# Patient Record
Sex: MALE | NOT HISPANIC OR LATINO
[De-identification: names, ages, dates, MRNs, and addresses within clinical notes are randomized per-mention and may not be internally consistent; named-entity substitution may affect disease eponyms.]

---

## 2017-08-01 PROBLEM — Z00.00 ENCOUNTER FOR PREVENTIVE HEALTH EXAMINATION: Status: ACTIVE | Noted: 2017-08-01

## 2017-08-10 ENCOUNTER — APPOINTMENT (OUTPATIENT)
Dept: GASTROENTEROLOGY | Facility: CLINIC | Age: 57
End: 2017-08-10

## 2017-08-10 ENCOUNTER — APPOINTMENT (OUTPATIENT)
Dept: THORACIC SURGERY | Facility: CLINIC | Age: 57
End: 2017-08-10
Payer: MEDICAID

## 2017-08-10 ENCOUNTER — APPOINTMENT (OUTPATIENT)
Dept: GASTROENTEROLOGY | Facility: CLINIC | Age: 57
End: 2017-08-10
Payer: MEDICAID

## 2017-08-10 VITALS
HEART RATE: 75 BPM | WEIGHT: 131 LBS | BODY MASS INDEX: 18.34 KG/M2 | SYSTOLIC BLOOD PRESSURE: 135 MMHG | DIASTOLIC BLOOD PRESSURE: 78 MMHG | OXYGEN SATURATION: 99 % | RESPIRATION RATE: 16 BRPM | HEIGHT: 71 IN

## 2017-08-10 VITALS
WEIGHT: 131 LBS | RESPIRATION RATE: 15 BRPM | OXYGEN SATURATION: 98 % | SYSTOLIC BLOOD PRESSURE: 122 MMHG | BODY MASS INDEX: 18.34 KG/M2 | HEART RATE: 79 BPM | DIASTOLIC BLOOD PRESSURE: 78 MMHG | HEIGHT: 71 IN | TEMPERATURE: 98.4 F

## 2017-08-10 DIAGNOSIS — Z85.72 PERSONAL HISTORY OF NON-HODGKIN LYMPHOMAS: ICD-10-CM

## 2017-08-10 DIAGNOSIS — R13.14 DYSPHAGIA, PHARYNGOESOPHAGEAL PHASE: ICD-10-CM

## 2017-08-10 DIAGNOSIS — Z80.42 FAMILY HISTORY OF MALIGNANT NEOPLASM OF PROSTATE: ICD-10-CM

## 2017-08-10 DIAGNOSIS — Z78.9 OTHER SPECIFIED HEALTH STATUS: ICD-10-CM

## 2017-08-10 DIAGNOSIS — F17.200 NICOTINE DEPENDENCE, UNSPECIFIED, UNCOMPLICATED: ICD-10-CM

## 2017-08-10 DIAGNOSIS — F17.210 NICOTINE DEPENDENCE, CIGARETTES, UNCOMPLICATED: ICD-10-CM

## 2017-08-10 PROCEDURE — 99203 OFFICE O/P NEW LOW 30 MIN: CPT

## 2017-08-10 PROCEDURE — 99204 OFFICE O/P NEW MOD 45 MIN: CPT

## 2017-08-13 PROBLEM — R13.14 ESOPHAGEAL DYSPHAGIA: Status: ACTIVE | Noted: 2017-08-13

## 2017-08-13 PROBLEM — Z85.72 HISTORY OF NON-HODGKIN'S LYMPHOMA: Status: RESOLVED | Noted: 2017-08-13 | Resolved: 2017-08-13

## 2017-08-13 PROBLEM — F17.210 SMOKES CIGARETTES: Status: ACTIVE | Noted: 2017-08-10

## 2017-08-24 ENCOUNTER — APPOINTMENT (OUTPATIENT)
Dept: THORACIC SURGERY | Facility: CLINIC | Age: 57
End: 2017-08-24
Payer: MEDICAID

## 2017-08-24 VITALS
SYSTOLIC BLOOD PRESSURE: 138 MMHG | OXYGEN SATURATION: 98 % | HEIGHT: 71 IN | RESPIRATION RATE: 16 BRPM | WEIGHT: 131 LBS | BODY MASS INDEX: 18.34 KG/M2 | HEART RATE: 75 BPM | DIASTOLIC BLOOD PRESSURE: 78 MMHG

## 2017-08-24 PROCEDURE — 99213 OFFICE O/P EST LOW 20 MIN: CPT

## 2017-09-25 ENCOUNTER — APPOINTMENT (OUTPATIENT)
Dept: THORACIC SURGERY | Facility: CLINIC | Age: 57
End: 2017-09-25

## 2017-11-13 ENCOUNTER — APPOINTMENT (OUTPATIENT)
Dept: THORACIC SURGERY | Facility: CLINIC | Age: 57
End: 2017-11-13
Payer: MEDICAID

## 2017-11-13 VITALS
DIASTOLIC BLOOD PRESSURE: 60 MMHG | BODY MASS INDEX: 17.64 KG/M2 | SYSTOLIC BLOOD PRESSURE: 92 MMHG | HEIGHT: 71 IN | HEART RATE: 95 BPM | WEIGHT: 126 LBS

## 2017-11-13 PROCEDURE — 99213 OFFICE O/P EST LOW 20 MIN: CPT

## 2017-12-06 ENCOUNTER — APPOINTMENT (OUTPATIENT)
Dept: SURGICAL ONCOLOGY | Facility: CLINIC | Age: 57
End: 2017-12-06

## 2017-12-20 ENCOUNTER — APPOINTMENT (OUTPATIENT)
Dept: SURGICAL ONCOLOGY | Facility: CLINIC | Age: 57
End: 2017-12-20
Payer: MEDICAID

## 2018-01-03 ENCOUNTER — APPOINTMENT (OUTPATIENT)
Dept: SURGICAL ONCOLOGY | Facility: CLINIC | Age: 58
End: 2018-01-03
Payer: MEDICAID

## 2018-01-03 VITALS
RESPIRATION RATE: 12 BRPM | OXYGEN SATURATION: 98 % | DIASTOLIC BLOOD PRESSURE: 73 MMHG | HEART RATE: 95 BPM | TEMPERATURE: 98.6 F | SYSTOLIC BLOOD PRESSURE: 122 MMHG

## 2018-01-03 VITALS — HEIGHT: 71 IN | WEIGHT: 123.57 LBS | BODY MASS INDEX: 17.3 KG/M2

## 2018-01-03 DIAGNOSIS — C15.9 MALIGNANT NEOPLASM OF ESOPHAGUS, UNSPECIFIED: ICD-10-CM

## 2018-01-03 PROCEDURE — 99245 OFF/OP CONSLTJ NEW/EST HI 55: CPT

## 2018-01-03 RX ORDER — ONDANSETRON 4 MG/1
4 TABLET, ORALLY DISINTEGRATING ORAL EVERY 8 HOURS
Qty: 30 | Refills: 1 | Status: ACTIVE | COMMUNITY
Start: 2018-01-03 | End: 1900-01-01

## 2018-01-03 RX ORDER — LIDOCAINE HYDROCHLORIDE 20 MG/ML
2 SOLUTION OROPHARYNGEAL
Qty: 1 | Refills: 3 | Status: ACTIVE | COMMUNITY
Start: 2018-01-03 | End: 1900-01-01

## 2018-01-03 RX ORDER — OXYCODONE HYDROCHLORIDE 15 MG/1
15 TABLET ORAL
Qty: 40 | Refills: 0 | Status: ACTIVE | COMMUNITY
Start: 2018-01-03 | End: 1900-01-01

## 2018-01-16 ENCOUNTER — RESULT REVIEW (OUTPATIENT)
Age: 58
End: 2018-01-16

## 2018-01-16 ENCOUNTER — OUTPATIENT (OUTPATIENT)
Dept: OUTPATIENT SERVICES | Facility: HOSPITAL | Age: 58
LOS: 1 days | End: 2018-01-16

## 2018-01-16 DIAGNOSIS — C15.9 MALIGNANT NEOPLASM OF ESOPHAGUS, UNSPECIFIED: ICD-10-CM

## 2018-02-11 ENCOUNTER — FORM ENCOUNTER (OUTPATIENT)
Age: 58
End: 2018-02-11

## 2018-02-12 ENCOUNTER — NON-APPOINTMENT (OUTPATIENT)
Age: 58
End: 2018-02-12

## 2018-02-12 ENCOUNTER — APPOINTMENT (OUTPATIENT)
Dept: CARDIOLOGY | Facility: CLINIC | Age: 58
End: 2018-02-12
Payer: MEDICAID

## 2018-02-12 ENCOUNTER — OUTPATIENT (OUTPATIENT)
Dept: OUTPATIENT SERVICES | Facility: HOSPITAL | Age: 58
LOS: 1 days | End: 2018-02-12
Payer: MEDICAID

## 2018-02-12 VITALS
BODY MASS INDEX: 17.5 KG/M2 | SYSTOLIC BLOOD PRESSURE: 94 MMHG | HEIGHT: 71 IN | DIASTOLIC BLOOD PRESSURE: 60 MMHG | HEART RATE: 84 BPM | WEIGHT: 125 LBS | OXYGEN SATURATION: 99 %

## 2018-02-12 VITALS
SYSTOLIC BLOOD PRESSURE: 108 MMHG | WEIGHT: 119.05 LBS | TEMPERATURE: 98 F | HEIGHT: 71 IN | DIASTOLIC BLOOD PRESSURE: 72 MMHG | HEART RATE: 93 BPM | RESPIRATION RATE: 16 BRPM

## 2018-02-12 VITALS — SYSTOLIC BLOOD PRESSURE: 99 MMHG | DIASTOLIC BLOOD PRESSURE: 60 MMHG

## 2018-02-12 DIAGNOSIS — Z01.818 ENCOUNTER FOR OTHER PREPROCEDURAL EXAMINATION: ICD-10-CM

## 2018-02-12 DIAGNOSIS — C15.9 MALIGNANT NEOPLASM OF ESOPHAGUS, UNSPECIFIED: ICD-10-CM

## 2018-02-12 DIAGNOSIS — K21.9 GASTRO-ESOPHAGEAL REFLUX DISEASE WITHOUT ESOPHAGITIS: ICD-10-CM

## 2018-02-12 DIAGNOSIS — R06.09 OTHER FORMS OF DYSPNEA: ICD-10-CM

## 2018-02-12 DIAGNOSIS — Z01.810 ENCOUNTER FOR PREPROCEDURAL CARDIOVASCULAR EXAMINATION: ICD-10-CM

## 2018-02-12 LAB
ANION GAP SERPL CALC-SCNC: 17 MMOL/L — SIGNIFICANT CHANGE UP (ref 5–17)
APTT BLD: 31 SEC — SIGNIFICANT CHANGE UP (ref 27.5–37.4)
BASOPHILS # BLD AUTO: 0 K/UL — SIGNIFICANT CHANGE UP (ref 0–0.2)
BASOPHILS NFR BLD AUTO: 0.7 % — SIGNIFICANT CHANGE UP (ref 0–2)
BLD GP AB SCN SERPL QL: SIGNIFICANT CHANGE UP
BUN SERPL-MCNC: 15 MG/DL — SIGNIFICANT CHANGE UP (ref 8–20)
CALCIUM SERPL-MCNC: 9 MG/DL — SIGNIFICANT CHANGE UP (ref 8.6–10.2)
CHLORIDE SERPL-SCNC: 100 MMOL/L — SIGNIFICANT CHANGE UP (ref 98–107)
CO2 SERPL-SCNC: 25 MMOL/L — SIGNIFICANT CHANGE UP (ref 22–29)
CREAT SERPL-MCNC: 0.78 MG/DL — SIGNIFICANT CHANGE UP (ref 0.5–1.3)
EOSINOPHIL # BLD AUTO: 0.2 K/UL — SIGNIFICANT CHANGE UP (ref 0–0.5)
EOSINOPHIL NFR BLD AUTO: 3.7 % — SIGNIFICANT CHANGE UP (ref 0–5)
GLUCOSE SERPL-MCNC: 110 MG/DL — SIGNIFICANT CHANGE UP (ref 70–115)
HBA1C BLD-MCNC: 5.5 % — SIGNIFICANT CHANGE UP (ref 4–5.6)
HCT VFR BLD CALC: 32.6 % — LOW (ref 42–52)
HGB BLD-MCNC: 10.4 G/DL — LOW (ref 14–18)
INR BLD: 1.06 RATIO — SIGNIFICANT CHANGE UP (ref 0.88–1.16)
LYMPHOCYTES # BLD AUTO: 0.5 K/UL — LOW (ref 1–4.8)
LYMPHOCYTES # BLD AUTO: 10.9 % — LOW (ref 20–55)
MCHC RBC-ENTMCNC: 29.6 PG — SIGNIFICANT CHANGE UP (ref 27–31)
MCHC RBC-ENTMCNC: 31.9 G/DL — LOW (ref 32–36)
MCV RBC AUTO: 92.9 FL — SIGNIFICANT CHANGE UP (ref 80–94)
MONOCYTES # BLD AUTO: 0.5 K/UL — SIGNIFICANT CHANGE UP (ref 0–0.8)
MONOCYTES NFR BLD AUTO: 10.1 % — HIGH (ref 3–10)
NEUTROPHILS # BLD AUTO: 3.4 K/UL — SIGNIFICANT CHANGE UP (ref 1.8–8)
NEUTROPHILS NFR BLD AUTO: 74.4 % — HIGH (ref 37–73)
PLATELET # BLD AUTO: 464 K/UL — HIGH (ref 150–400)
POTASSIUM SERPL-MCNC: 4.3 MMOL/L — SIGNIFICANT CHANGE UP (ref 3.5–5.3)
POTASSIUM SERPL-SCNC: 4.3 MMOL/L — SIGNIFICANT CHANGE UP (ref 3.5–5.3)
PROTHROM AB SERPL-ACNC: 11.7 SEC — SIGNIFICANT CHANGE UP (ref 9.8–12.7)
RBC # BLD: 3.51 M/UL — LOW (ref 4.6–6.2)
RBC # FLD: 14.2 % — SIGNIFICANT CHANGE UP (ref 11–15.6)
SODIUM SERPL-SCNC: 142 MMOL/L — SIGNIFICANT CHANGE UP (ref 135–145)
TYPE + AB SCN PNL BLD: SIGNIFICANT CHANGE UP
WBC # BLD: 4.6 K/UL — LOW (ref 4.8–10.8)
WBC # FLD AUTO: 4.6 K/UL — LOW (ref 4.8–10.8)

## 2018-02-12 PROCEDURE — 93000 ELECTROCARDIOGRAM COMPLETE: CPT

## 2018-02-12 PROCEDURE — 86850 RBC ANTIBODY SCREEN: CPT

## 2018-02-12 PROCEDURE — 93010 ELECTROCARDIOGRAM REPORT: CPT

## 2018-02-12 PROCEDURE — 71046 X-RAY EXAM CHEST 2 VIEWS: CPT

## 2018-02-12 PROCEDURE — 71046 X-RAY EXAM CHEST 2 VIEWS: CPT | Mod: 26

## 2018-02-12 PROCEDURE — 36415 COLL VENOUS BLD VENIPUNCTURE: CPT

## 2018-02-12 PROCEDURE — 83036 HEMOGLOBIN GLYCOSYLATED A1C: CPT

## 2018-02-12 PROCEDURE — 99204 OFFICE O/P NEW MOD 45 MIN: CPT

## 2018-02-12 PROCEDURE — 80048 BASIC METABOLIC PNL TOTAL CA: CPT

## 2018-02-12 PROCEDURE — 86901 BLOOD TYPING SEROLOGIC RH(D): CPT

## 2018-02-12 PROCEDURE — 85027 COMPLETE CBC AUTOMATED: CPT

## 2018-02-12 PROCEDURE — 85730 THROMBOPLASTIN TIME PARTIAL: CPT

## 2018-02-12 PROCEDURE — 93005 ELECTROCARDIOGRAM TRACING: CPT

## 2018-02-12 PROCEDURE — 86900 BLOOD TYPING SEROLOGIC ABO: CPT

## 2018-02-12 PROCEDURE — G0463: CPT

## 2018-02-12 PROCEDURE — 85610 PROTHROMBIN TIME: CPT

## 2018-02-12 RX ORDER — CEFAZOLIN SODIUM 1 G
2000 VIAL (EA) INJECTION ONCE
Qty: 0 | Refills: 0 | Status: DISCONTINUED | OUTPATIENT
Start: 2018-02-26 | End: 2018-02-27

## 2018-02-12 RX ORDER — INFLUENZA VIRUS VACCINE 15; 15; 15; 15 UG/.5ML; UG/.5ML; UG/.5ML; UG/.5ML
0.5 SUSPENSION INTRAMUSCULAR ONCE
Qty: 0 | Refills: 0 | Status: COMPLETED | OUTPATIENT
Start: 2018-02-12 | End: 2018-02-12

## 2018-02-12 NOTE — H&P PST ADULT - HISTORY OF PRESENT ILLNESS
Pt is a 57 y.o male with esophageal cancer diagnosed 5 months ago with recent unintentional weight loss of about 40 lbs now scheduled for esophagogastroduodenoscopy (EGD) Robotic [place] Mehran esophagogastrectomy.

## 2018-02-12 NOTE — H&P PST ADULT - ASSESSMENT
Pt is a 57 y.o male with PMH of Hodgkin's lymphoma and esophageal cancer undergoing esophagogastroduodenoscopy (EGD) Robotic Yfn Mehran esophagogastrectomy. Instructed to hold any medications containing ibuprofen and aspirin 1 week prior to surgery.

## 2018-02-12 NOTE — H&P PST ADULT - NSANTHOSAYNRD_GEN_A_CORE
No. KILO screening performed.  STOP BANG Legend: 0-2 = LOW Risk; 3-4 = INTERMEDIATE Risk; 5-8 = HIGH Risk

## 2018-02-12 NOTE — H&P PST ADULT - PMH
Acid reflux disease    Hodgkins lymphoma  25 years ago  Weight loss, unintentional Acid reflux disease    Esophageal cancer    Hodgkins lymphoma  25 years ago  Weight loss, unintentional

## 2018-02-19 ENCOUNTER — FORM ENCOUNTER (OUTPATIENT)
Age: 58
End: 2018-02-19

## 2018-02-20 ENCOUNTER — OUTPATIENT (OUTPATIENT)
Dept: OUTPATIENT SERVICES | Facility: HOSPITAL | Age: 58
LOS: 1 days | End: 2018-02-20
Payer: MEDICAID

## 2018-02-20 DIAGNOSIS — R06.09 OTHER FORMS OF DYSPNEA: ICD-10-CM

## 2018-02-20 PROCEDURE — A9500: CPT

## 2018-02-20 PROCEDURE — 93306 TTE W/DOPPLER COMPLETE: CPT

## 2018-02-20 PROCEDURE — 93018 CV STRESS TEST I&R ONLY: CPT

## 2018-02-20 PROCEDURE — 78452 HT MUSCLE IMAGE SPECT MULT: CPT | Mod: 26

## 2018-02-20 PROCEDURE — 93016 CV STRESS TEST SUPVJ ONLY: CPT

## 2018-02-20 PROCEDURE — 93017 CV STRESS TEST TRACING ONLY: CPT

## 2018-02-20 PROCEDURE — 78452 HT MUSCLE IMAGE SPECT MULT: CPT

## 2018-02-20 PROCEDURE — 93306 TTE W/DOPPLER COMPLETE: CPT | Mod: 26

## 2018-02-26 ENCOUNTER — RESULT REVIEW (OUTPATIENT)
Age: 58
End: 2018-02-26

## 2018-02-26 ENCOUNTER — INPATIENT (INPATIENT)
Facility: HOSPITAL | Age: 58
LOS: 15 days | DRG: 326 | End: 2018-03-14
Attending: THORACIC SURGERY (CARDIOTHORACIC VASCULAR SURGERY) | Admitting: SPECIALIST
Payer: MEDICAID

## 2018-02-26 ENCOUNTER — APPOINTMENT (OUTPATIENT)
Dept: THORACIC SURGERY | Facility: HOSPITAL | Age: 58
End: 2018-02-26
Payer: MEDICAID

## 2018-02-26 ENCOUNTER — APPOINTMENT (OUTPATIENT)
Dept: SURGICAL ONCOLOGY | Facility: HOSPITAL | Age: 58
End: 2018-02-26

## 2018-02-26 VITALS
HEIGHT: 71 IN | WEIGHT: 119.05 LBS | RESPIRATION RATE: 16 BRPM | HEART RATE: 93 BPM | DIASTOLIC BLOOD PRESSURE: 75 MMHG | SYSTOLIC BLOOD PRESSURE: 110 MMHG | TEMPERATURE: 99 F | OXYGEN SATURATION: 100 %

## 2018-02-26 DIAGNOSIS — C15.9 MALIGNANT NEOPLASM OF ESOPHAGUS, UNSPECIFIED: ICD-10-CM

## 2018-02-26 LAB
ALBUMIN SERPL ELPH-MCNC: 3.6 G/DL — SIGNIFICANT CHANGE UP (ref 3.3–5.2)
ALP SERPL-CCNC: 65 U/L — SIGNIFICANT CHANGE UP (ref 40–120)
ALT FLD-CCNC: 31 U/L — SIGNIFICANT CHANGE UP
APTT BLD: 27.2 SEC — LOW (ref 27.5–37.4)
AST SERPL-CCNC: 65 U/L — HIGH
BILIRUB DIRECT SERPL-MCNC: 0.2 MG/DL — SIGNIFICANT CHANGE UP (ref 0–0.3)
BILIRUB INDIRECT FLD-MCNC: 0.4 MG/DL — SIGNIFICANT CHANGE UP (ref 0.2–1)
BILIRUB SERPL-MCNC: 0.6 MG/DL — SIGNIFICANT CHANGE UP (ref 0.4–2)
BLD GP AB SCN SERPL QL: SIGNIFICANT CHANGE UP
HCT VFR BLD CALC: 33.3 % — LOW (ref 42–52)
HGB BLD-MCNC: 10.8 G/DL — LOW (ref 14–18)
INR BLD: 1.06 RATIO — SIGNIFICANT CHANGE UP (ref 0.88–1.16)
MAGNESIUM SERPL-MCNC: 1.7 MG/DL — SIGNIFICANT CHANGE UP (ref 1.6–2.6)
MCHC RBC-ENTMCNC: 29.3 PG — SIGNIFICANT CHANGE UP (ref 27–31)
MCHC RBC-ENTMCNC: 32.4 G/DL — SIGNIFICANT CHANGE UP (ref 32–36)
MCV RBC AUTO: 90.2 FL — SIGNIFICANT CHANGE UP (ref 80–94)
PLATELET # BLD AUTO: 226 K/UL — SIGNIFICANT CHANGE UP (ref 150–400)
PROT SERPL-MCNC: 6.8 G/DL — SIGNIFICANT CHANGE UP (ref 6.6–8.7)
PROTHROM AB SERPL-ACNC: 11.7 SEC — SIGNIFICANT CHANGE UP (ref 9.8–12.7)
RBC # BLD: 3.69 M/UL — LOW (ref 4.6–6.2)
RBC # FLD: 14.6 % — SIGNIFICANT CHANGE UP (ref 11–15.6)
WBC # BLD: 10.2 K/UL — SIGNIFICANT CHANGE UP (ref 4.8–10.8)
WBC # FLD AUTO: 10.2 K/UL — SIGNIFICANT CHANGE UP (ref 4.8–10.8)

## 2018-02-26 PROCEDURE — 43236 UPPR GI SCOPE W/SUBMUC INJ: CPT

## 2018-02-26 PROCEDURE — 43117 PARTIAL REMOVAL OF ESOPHAGUS: CPT

## 2018-02-26 PROCEDURE — 88309 TISSUE EXAM BY PATHOLOGIST: CPT | Mod: 26

## 2018-02-26 PROCEDURE — 31622 DX BRONCHOSCOPE/WASH: CPT

## 2018-02-26 PROCEDURE — 43112 ESPHG TOT W/THRCM: CPT

## 2018-02-26 PROCEDURE — 44015 INSERT NEEDLE CATH BOWEL: CPT

## 2018-02-26 PROCEDURE — 38747 REMOVE ABDOMINAL LYMPH NODES: CPT | Mod: 59

## 2018-02-26 PROCEDURE — 71045 X-RAY EXAM CHEST 1 VIEW: CPT | Mod: 26

## 2018-02-26 PROCEDURE — ZZZZZ: CPT

## 2018-02-26 PROCEDURE — 88305 TISSUE EXAM BY PATHOLOGIST: CPT | Mod: 26

## 2018-02-26 RX ORDER — PANTOPRAZOLE SODIUM 20 MG/1
1 TABLET, DELAYED RELEASE ORAL
Qty: 0 | Refills: 0 | COMMUNITY

## 2018-02-26 RX ORDER — SODIUM CHLORIDE 9 MG/ML
3 INJECTION INTRAMUSCULAR; INTRAVENOUS; SUBCUTANEOUS EVERY 8 HOURS
Qty: 0 | Refills: 0 | Status: DISCONTINUED | OUTPATIENT
Start: 2018-02-26 | End: 2018-02-26

## 2018-02-26 RX ORDER — SUCRALFATE 1 G
1 TABLET ORAL
Qty: 0 | Refills: 0 | COMMUNITY

## 2018-02-26 RX ORDER — ACETAMINOPHEN 500 MG
1000 TABLET ORAL ONCE
Qty: 0 | Refills: 0 | Status: COMPLETED | OUTPATIENT
Start: 2018-02-26 | End: 2018-02-26

## 2018-02-26 RX ORDER — FENTANYL CITRATE 50 UG/ML
25 INJECTION INTRAVENOUS ONCE
Qty: 0 | Refills: 0 | Status: DISCONTINUED | OUTPATIENT
Start: 2018-02-26 | End: 2018-02-26

## 2018-02-26 RX ORDER — SODIUM CHLORIDE 9 MG/ML
1000 INJECTION, SOLUTION INTRAVENOUS
Qty: 0 | Refills: 0 | Status: DISCONTINUED | OUTPATIENT
Start: 2018-02-26 | End: 2018-03-06

## 2018-02-26 RX ORDER — CEFAZOLIN SODIUM 1 G
1000 VIAL (EA) INJECTION EVERY 8 HOURS
Qty: 0 | Refills: 0 | Status: COMPLETED | OUTPATIENT
Start: 2018-02-26 | End: 2018-02-27

## 2018-02-26 RX ADMIN — FENTANYL CITRATE 25 MICROGRAM(S): 50 INJECTION INTRAVENOUS at 22:34

## 2018-02-26 RX ADMIN — Medication 1000 MILLIGRAM(S): at 23:14

## 2018-02-26 RX ADMIN — FENTANYL CITRATE 25 MICROGRAM(S): 50 INJECTION INTRAVENOUS at 23:14

## 2018-02-26 RX ADMIN — Medication 400 MILLIGRAM(S): at 23:00

## 2018-02-26 NOTE — BRIEF OPERATIVE NOTE - PROCEDURE
<<-----Click on this checkbox to enter Procedure Jejunostomy feeding tube placement  02/26/2018  robotic assisted  Active  [facility]  EGD  02/26/2018    Active  [facility]  Robot-assisted esophagectomy using da Rohit Si system  02/26/2018    [facility]  02/26/2018  one frozen section bx. and 4 permanent biopsies  Active  [facility]IG  EGD  02/26/2018    Active  [facility]  Robot-assisted esophagectomy using da Rohit Si system  02/26/2018    Active  [facility]  Jejunostomy feeding tube placement  02/26/2018  robotic assisted  Active  Rickey Vera

## 2018-02-26 NOTE — BRIEF OPERATIVE NOTE - PROCEDURE
<<-----Click on this checkbox to enter Procedure Robot-assisted esophagectomy using da Rohit Si system  02/26/2018    Active  [facility]  Jejunostomy feeding tube placement  02/26/2018  robotic assisted  Active  Rickey Vera

## 2018-02-26 NOTE — BRIEF OPERATIVE NOTE - SPECIMENS
1 esophagus ( portion of ) and stomach ( portion of ) 1. frozen section, 2. 4 additional biopsies 3. esophagus ( portion of ) and stomach ( portion of )

## 2018-02-27 ENCOUNTER — TRANSCRIPTION ENCOUNTER (OUTPATIENT)
Age: 58
End: 2018-02-27

## 2018-02-27 DIAGNOSIS — C15.9 MALIGNANT NEOPLASM OF ESOPHAGUS, UNSPECIFIED: ICD-10-CM

## 2018-02-27 DIAGNOSIS — G89.18 OTHER ACUTE POSTPROCEDURAL PAIN: ICD-10-CM

## 2018-02-27 DIAGNOSIS — Z93.4 OTHER ARTIFICIAL OPENINGS OF GASTROINTESTINAL TRACT STATUS: ICD-10-CM

## 2018-02-27 DIAGNOSIS — Z29.9 ENCOUNTER FOR PROPHYLACTIC MEASURES, UNSPECIFIED: ICD-10-CM

## 2018-02-27 LAB
ANION GAP SERPL CALC-SCNC: 12 MMOL/L — SIGNIFICANT CHANGE UP (ref 5–17)
BUN SERPL-MCNC: 19 MG/DL — SIGNIFICANT CHANGE UP (ref 8–20)
CALCIUM SERPL-MCNC: 8.5 MG/DL — LOW (ref 8.6–10.2)
CHLORIDE SERPL-SCNC: 100 MMOL/L — SIGNIFICANT CHANGE UP (ref 98–107)
CO2 SERPL-SCNC: 24 MMOL/L — SIGNIFICANT CHANGE UP (ref 22–29)
CREAT SERPL-MCNC: 0.94 MG/DL — SIGNIFICANT CHANGE UP (ref 0.5–1.3)
GLUCOSE SERPL-MCNC: 107 MG/DL — SIGNIFICANT CHANGE UP (ref 70–115)
HCT VFR BLD CALC: 32.9 % — LOW (ref 42–52)
HGB BLD-MCNC: 10.8 G/DL — LOW (ref 14–18)
MAGNESIUM SERPL-MCNC: 1.7 MG/DL — SIGNIFICANT CHANGE UP (ref 1.6–2.6)
MCHC RBC-ENTMCNC: 29.4 PG — SIGNIFICANT CHANGE UP (ref 27–31)
MCHC RBC-ENTMCNC: 32.8 G/DL — SIGNIFICANT CHANGE UP (ref 32–36)
MCV RBC AUTO: 89.6 FL — SIGNIFICANT CHANGE UP (ref 80–94)
PHOSPHATE SERPL-MCNC: 4.2 MG/DL — SIGNIFICANT CHANGE UP (ref 2.4–4.7)
PLATELET # BLD AUTO: 234 K/UL — SIGNIFICANT CHANGE UP (ref 150–400)
POTASSIUM SERPL-MCNC: 4.5 MMOL/L — SIGNIFICANT CHANGE UP (ref 3.5–5.3)
POTASSIUM SERPL-SCNC: 4.5 MMOL/L — SIGNIFICANT CHANGE UP (ref 3.5–5.3)
RBC # BLD: 3.67 M/UL — LOW (ref 4.6–6.2)
RBC # FLD: 14.5 % — SIGNIFICANT CHANGE UP (ref 11–15.6)
SODIUM SERPL-SCNC: 136 MMOL/L — SIGNIFICANT CHANGE UP (ref 135–145)
WBC # BLD: 8.4 K/UL — SIGNIFICANT CHANGE UP (ref 4.8–10.8)
WBC # FLD AUTO: 8.4 K/UL — SIGNIFICANT CHANGE UP (ref 4.8–10.8)

## 2018-02-27 PROCEDURE — 74018 RADEX ABDOMEN 1 VIEW: CPT | Mod: 26,59

## 2018-02-27 PROCEDURE — 71045 X-RAY EXAM CHEST 1 VIEW: CPT | Mod: 26

## 2018-02-27 PROCEDURE — 99233 SBSQ HOSP IP/OBS HIGH 50: CPT

## 2018-02-27 PROCEDURE — 74018 RADEX ABDOMEN 1 VIEW: CPT | Mod: 26,77,59

## 2018-02-27 RX ORDER — KETOROLAC TROMETHAMINE 30 MG/ML
15 SYRINGE (ML) INJECTION EVERY 6 HOURS
Qty: 0 | Refills: 0 | Status: DISCONTINUED | OUTPATIENT
Start: 2018-02-27 | End: 2018-03-01

## 2018-02-27 RX ORDER — ENOXAPARIN SODIUM 100 MG/ML
40 INJECTION SUBCUTANEOUS DAILY
Qty: 0 | Refills: 0 | Status: DISCONTINUED | OUTPATIENT
Start: 2018-02-27 | End: 2018-03-02

## 2018-02-27 RX ORDER — MAGNESIUM SULFATE 500 MG/ML
2 VIAL (ML) INJECTION ONCE
Qty: 0 | Refills: 0 | Status: COMPLETED | OUTPATIENT
Start: 2018-02-27 | End: 2018-02-27

## 2018-02-27 RX ORDER — KETOROLAC TROMETHAMINE 30 MG/ML
30 SYRINGE (ML) INJECTION ONCE
Qty: 0 | Refills: 0 | Status: DISCONTINUED | OUTPATIENT
Start: 2018-02-27 | End: 2018-02-27

## 2018-02-27 RX ADMIN — SODIUM CHLORIDE 125 MILLILITER(S): 9 INJECTION, SOLUTION INTRAVENOUS at 11:33

## 2018-02-27 RX ADMIN — Medication 50 GRAM(S): at 08:21

## 2018-02-27 RX ADMIN — ENOXAPARIN SODIUM 40 MILLIGRAM(S): 100 INJECTION SUBCUTANEOUS at 13:01

## 2018-02-27 RX ADMIN — Medication 30 MILLIGRAM(S): at 13:01

## 2018-02-27 RX ADMIN — Medication 15 MILLIGRAM(S): at 19:45

## 2018-02-27 RX ADMIN — Medication 100 MILLIGRAM(S): at 00:18

## 2018-02-27 RX ADMIN — Medication 30 MILLIGRAM(S): at 13:16

## 2018-02-27 RX ADMIN — Medication 100 MILLIGRAM(S): at 06:12

## 2018-02-27 RX ADMIN — Medication 15 MILLIGRAM(S): at 19:23

## 2018-02-27 NOTE — PROGRESS NOTE ADULT - PROBLEM SELECTOR PLAN 1
admitted to CTICU 2/26  NGT to LIWS  Do not manipulate, d/c or use NGT for feeds/meds  strict NPO (feeds per j-tube as below)  continue right CTs to suction as ordered  continue PCEA per pain mgmt svc and toradol PRN  above including j-tube feeds as d/w Dr Blanco

## 2018-02-27 NOTE — CONSULT NOTE ADULT - SUBJECTIVE AND OBJECTIVE BOX
Chief Complaint: post operative pain    HPI: 58 yo male with Pt is a 57 y.o male PMH significant for esophageal cancer diagnosed 5 months ago with recent unintentional weight loss of about 40 lbs. He is now POD #1 s/p Robot-assisted esophagectomy using da Rohit Si system  02/26/2018  and Jejunostomy feeding tube placement  02/26/2018  robotic assisted. He is currently using epidural cath, continuous rate. He is reporting pain is suboptimally controlled with regimen. Pain is currently 8/10 with medications increasing to 10/10 at times. He denies side effects from the medications. Currently NPO at this time with chest tube placed. He is reporting pain is mostly located across the abdomen, diffuse, where the drain is. Pain is cramping, shooting, stabbing. Denies numbness or tingling into lower extremities or upper extremities. Denies SOB. He is able to move his extremities without difficulty. Castro catheter and NGT in place.           PAST MEDICAL & SURGICAL HISTORY:  Esophageal cancer  Weight loss, unintentional  Hodgkins lymphoma: 25 years ago  Acid reflux disease  No significant past surgical history      FAMILY HISTORY:  Family history of prostate cancer in father (Father)      SOCIAL HISTORY:  [ ] Denies Smoking, Alcohol, or Drug Use    Allergies    No Known Allergies    Intolerances        PAIN MEDICATIONS:  fentaNYL (2 MICROgram(s)/mL) + BUpivacaine 0.0625%  in 0.9% Sodium Chloride PCEA 250 milliLiter(s) Epidural PCA Continuous    Heme:    Antibiotics:    Cardiovascular:    GI:    Endocrine:    All Other Medications:  influenza   Vaccine 0.5 milliLiter(s) IntraMuscular once  lactated ringers. 1000 milliLiter(s) IV Continuous <Continuous>      REVIEW OF SYSTEMS:    CONSTITUTIONAL: No fever, weight loss, or fatigue  EYES: No eye pain, visual disturbances, or discharge  ENMT:  No difficulty hearing, tinnitus, vertigo; No sinus or throat pain  NECK: No pain or stiffness  RESPIRATORY: No cough, wheezing, chills or hemoptysis; No shortness of breath  CARDIOVASCULAR: No chest pain, palpitations, dizziness, or leg swelling  GASTROINTESTINAL: + post surgical abdominal or epigastric pain. No nausea, vomiting, or hematemesis; No diarrhea or constipation. No melena or hematochezia.  GENITOURINARY: No dysuria, frequency, hematuria, or incontinence  NEUROLOGICAL: No headaches, memory loss, loss of strength, numbness, or tremors  SKIN: No itching, burning, rashes, or lesions   LYMPH NODES: No enlarged glands  ENDOCRINE: No heat or cold intolerance; No hair loss  MUSCULOSKELETAL: No joint pain or swelling; No muscle, back, or extremity pain  PSYCHIATRIC: No depression, anxiety, mood swings, or difficulty sleeping  HEME/LYMPH: No easy bruising, or bleeding gums  ALLERY AND IMMUNOLOGIC: No hives or eczema      Vital Signs Last 24 Hrs  T(C): 36.3 (26 Feb 2018 21:00), Max: 36.3 (26 Feb 2018 21:00)  T(F): 97.3 (26 Feb 2018 21:00), Max: 97.3 (26 Feb 2018 21:00)  HR: 87 (27 Feb 2018 09:00) (83 - 103)  BP: 141/82 (27 Feb 2018 09:00) (95/56 - 159/78)  BP(mean): 106 (27 Feb 2018 09:00) (70 - 116)  RR: 26 (27 Feb 2018 09:00) (14 - 46)  SpO2: 96% (27 Feb 2018 09:00) (75% - 100%)    PAIN SCORE:    8     SCALE USED: (1-10 VNRS)             PHYSICAL EXAM:    GENERAL: NAD, well-groomed, well-developed, resting in bed,   HEAD:  Atraumatic, Normocephalic  EYES: EOMI, PERRLA, conjunctiva and sclera clear  ENMT: No tonsillar erythema, exudates, or enlargement; Moist mucous membranes, Good dentition, No lesions, NGT in place  NECK: Supple, No JVD, Normal thyroid  NERVOUS SYSTEM:  Alert & Oriented X3, Good concentration; Motor Strength 5/5 B/L upper and lower extremities; DTRs 2+ intact and symmetric  CHEST/LUNG: Clear to percussion bilaterally; No rales, rhonchi, wheezing, or rubs, chest tube in place  HEART: Regular rate and rhythm; No murmurs, rubs, or gallops  ABDOMEN: Soft, Nontender, Nondistended; Bowel sounds present, drain in place, castro catheter in place  EXTREMITIES:  2+ Peripheral Pulses, No clubbing, cyanosis, or edema  LYMPH: No lymphadenopathy noted  SKIN: No rashes or lesions        LABS:                          10.8   8.4   )-----------( 234      ( 27 Feb 2018 03:58 )             32.9     02-27    136  |  100  |  19.0  ----------------------------<  107  4.5   |  24.0  |  0.94    Ca    8.5<L>      27 Feb 2018 03:58  Phos  4.2     02-27  Mg     1.7     02-27    TPro  6.8  /  Alb  3.6  /  TBili  0.6  /  DBili  0.2  /  AST  65<H>  /  ALT  31  /  AlkPhos  65  02-26    PT/INR - ( 26 Feb 2018 23:01 )   PT: 11.7 sec;   INR: 1.06 ratio         PTT - ( 26 Feb 2018 23:01 )  PTT:27.2 sec      RADIOLOGY:    Drug Screen:            [X ]  [place]  Reviewed and Copied to Chart        Others' Prescriptions  Patient Name:	Prakash Dixon	YOB: 1960	  Address:	[address] [place] [address] [place] [address]	Sex:	Male	    Rx Written	Rx Dispensed	Drug	Quantity	Days Supply	Prescriber Name			  02/21/2018	02/21/2018	oxycodone hcl 15 mg tablet 	60	30	Jan Vinson (MD)			  01/30/2018	01/30/2018	oxycodone hcl 15 mg tablet 	40	10	Deniz Diamond			  01/03/2018	01/05/2018	oxycodone hcl 15 mg tablet 	40	10	Kely Griffin M (Eloise)			  11/13/2017	11/13/2017	oxycodone hcl 15 mg tablet 	120	30	Deniz Diamond			  10/12/2017	10/13/2017	oxycodone hcl 15 mg tablet 	120	30	Deniz Diamond			    Patient Name:	Prakash Dixon	YOB: 1960	  Address:	[address] [place] [address] [place] [address]	Sex:	Male	    Rx Written	Rx Dispensed	Drug	Quantity	Days Supply	Prescriber Name			  09/25/2017	09/25/2017	oxycodone hcl 5 mg tablet 	60	15	Felipe Hung Chief Complaint: post operative pain    HPI: 56 yo male with PMH significant for esophageal cancer diagnosed 5 months ago with recent unintentional weight loss of about 40 lbs. He is now POD #1 s/p Robot-assisted esophagectomy using da Rohit Si system  02/26/2018  and Jejunostomy feeding tube placement  02/26/2018  robotic assisted. He is currently using epidural cath, continuous rate. He is reporting pain is suboptimally controlled with regimen. Pain is currently 8/10 with medications increasing to 10/10 at times. He denies side effects from the medications. Currently NPO at this time with chest tube placed. He is reporting pain is mostly located across the abdomen, diffuse, where the drain is. Pain is cramping, shooting, stabbing. Denies numbness or tingling into lower extremities or upper extremities. Denies SOB. He is able to move his extremities without difficulty. Castro catheter and NGT in place.           PAST MEDICAL & SURGICAL HISTORY:  Esophageal cancer  Weight loss, unintentional  Hodgkins lymphoma: 25 years ago  Acid reflux disease  No significant past surgical history      FAMILY HISTORY:  Family history of prostate cancer in father (Father)      SOCIAL HISTORY:  [ ] Denies Smoking, Alcohol, or Drug Use    Allergies    No Known Allergies    Intolerances        PAIN MEDICATIONS:  fentaNYL (2 MICROgram(s)/mL) + BUpivacaine 0.0625%  in 0.9% Sodium Chloride PCEA 250 milliLiter(s) Epidural PCA Continuous    Heme:    Antibiotics:    Cardiovascular:    GI:    Endocrine:    All Other Medications:  influenza   Vaccine 0.5 milliLiter(s) IntraMuscular once  lactated ringers. 1000 milliLiter(s) IV Continuous <Continuous>      REVIEW OF SYSTEMS:    CONSTITUTIONAL: No fever, weight loss, or fatigue  EYES: No eye pain, visual disturbances, or discharge  ENMT:  No difficulty hearing, tinnitus, vertigo; No sinus or throat pain  NECK: No pain or stiffness  RESPIRATORY: No cough, wheezing, chills or hemoptysis; No shortness of breath  CARDIOVASCULAR: No chest pain, palpitations, dizziness, or leg swelling  GASTROINTESTINAL: + post surgical abdominal or epigastric pain. No nausea, vomiting, or hematemesis; No diarrhea or constipation. No melena or hematochezia.  GENITOURINARY: No dysuria, frequency, hematuria, or incontinence  NEUROLOGICAL: No headaches, memory loss, loss of strength, numbness, or tremors  SKIN: No itching, burning, rashes, or lesions   LYMPH NODES: No enlarged glands  ENDOCRINE: No heat or cold intolerance; No hair loss  MUSCULOSKELETAL: No joint pain or swelling; No muscle, back, or extremity pain  PSYCHIATRIC: No depression, anxiety, mood swings, or difficulty sleeping  HEME/LYMPH: No easy bruising, or bleeding gums  ALLERY AND IMMUNOLOGIC: No hives or eczema      Vital Signs Last 24 Hrs  T(C): 36.3 (26 Feb 2018 21:00), Max: 36.3 (26 Feb 2018 21:00)  T(F): 97.3 (26 Feb 2018 21:00), Max: 97.3 (26 Feb 2018 21:00)  HR: 87 (27 Feb 2018 09:00) (83 - 103)  BP: 141/82 (27 Feb 2018 09:00) (95/56 - 159/78)  BP(mean): 106 (27 Feb 2018 09:00) (70 - 116)  RR: 26 (27 Feb 2018 09:00) (14 - 46)  SpO2: 96% (27 Feb 2018 09:00) (75% - 100%)    PAIN SCORE:    8     SCALE USED: (1-10 VNRS)             PHYSICAL EXAM:    GENERAL: NAD, well-groomed, well-developed, resting in bed,   HEAD:  Atraumatic, Normocephalic  EYES: EOMI, PERRLA, conjunctiva and sclera clear  ENMT: No tonsillar erythema, exudates, or enlargement; Moist mucous membranes, Good dentition, No lesions, NGT in place  NECK: Supple, No JVD, Normal thyroid  NERVOUS SYSTEM:  Alert & Oriented X3, Good concentration; Motor Strength 5/5 B/L upper and lower extremities; DTRs 2+ intact and symmetric  CHEST/LUNG: Clear to percussion bilaterally; No rales, rhonchi, wheezing, or rubs, chest tube in place  HEART: Regular rate and rhythm; No murmurs, rubs, or gallops  ABDOMEN: Soft, Nontender, Nondistended; Bowel sounds present, drain in place, castro catheter in place  EXTREMITIES:  2+ Peripheral Pulses, No clubbing, cyanosis, or edema  LYMPH: No lymphadenopathy noted  SKIN: No rashes or lesions        LABS:                          10.8   8.4   )-----------( 234      ( 27 Feb 2018 03:58 )             32.9     02-27    136  |  100  |  19.0  ----------------------------<  107  4.5   |  24.0  |  0.94    Ca    8.5<L>      27 Feb 2018 03:58  Phos  4.2     02-27  Mg     1.7     02-27    TPro  6.8  /  Alb  3.6  /  TBili  0.6  /  DBili  0.2  /  AST  65<H>  /  ALT  31  /  AlkPhos  65  02-26    PT/INR - ( 26 Feb 2018 23:01 )   PT: 11.7 sec;   INR: 1.06 ratio         PTT - ( 26 Feb 2018 23:01 )  PTT:27.2 sec      RADIOLOGY:    Drug Screen:            [X ]  [place]  Reviewed and Copied to Chart        Others' Prescriptions  Patient Name:	Prakash Dixon	YOB: 1960	  Address:	[address] [place] [address] [address] [place] [id number]	Sex:	Male	    Rx Written	Rx Dispensed	Drug	Quantity	Days Supply	Prescriber Name			  02/21/2018	02/21/2018	oxycodone hcl 15 mg tablet 	60	30	Jan Vinson (MD)			  01/30/2018	01/30/2018	oxycodone hcl 15 mg tablet 	40	10	Deniz Diamond			  01/03/2018	01/05/2018	oxycodone hcl 15 mg tablet 	40	10	Kely Griffin M (Leslyep)			  11/13/2017	11/13/2017	oxycodone hcl 15 mg tablet 	120	30	Deniz Diamond			  10/12/2017	10/13/2017	oxycodone hcl 15 mg tablet 	120	30	Deniz Diamnod			    Patient Name:	Prakash Dixon	YOB: 1960	  Address:	[address] [place], [place] [address] [place] [address]	Sex:	Male	    Rx Written	Rx Dispensed	Drug	Quantity	Days Supply	Prescriber Name			  09/25/2017	09/25/2017	oxycodone hcl 5 mg tablet 	60	15	Felipe Hung

## 2018-02-27 NOTE — CONSULT NOTE ADULT - PROBLEM SELECTOR RECOMMENDATION 9
1. Patient is reporting pain is suboptimally controlled with current regimen  2. Using Epidural Fentanyl Cath with pain 10/10  3. I will adjust as follows      - Fentanyl 2 mcg + Bupivacaine 0.0625% in 0.9% sodium chloride PCEA                -- volume: 250 ml                -- Initial Bolus: I will add a one time 4 mL bolus to be given OVER 30                  MINUTES                 -- Demand : 0                -- Lockout: 0                -- Initial Continuous rate: I will increase it 10 mL/hour                -- 4 hour limit of: 60 mL/hour  4. monitor for sedation or respiratory depression  5. monitor for changes in sensation to lower extremities, upper extremities- insinuating that the cath has moved/not placed properly  6. will continue to monitor  7. call with questions   8. thank you for the consult 1. Patient is reporting pain is suboptimally controlled with current regimen  2. Using Epidural Fentanyl Cath with pain 10/10  3. I will adjust as follows      - Fentanyl 2 mcg + Bupivacaine 0.0625% in 0.9% sodium chloride PCEA                -- volume: 250 ml                -- Initial Bolus: I will add a one time 6 mL bolus to be given OVER 30                  MINUTES                 -- Demand : 0                -- Lockout: 0                -- Initial Continuous rate: I will increase it 10 mL/hour                -- 4 hour limit of: 60 mL/hour  4. monitor for sedation or respiratory depression  5. monitor for changes in sensation to lower extremities, upper extremities- insinuating that the cath has moved/not placed properly  6. will continue to monitor  7. call with questions   8. thank you for the consult

## 2018-02-27 NOTE — PROGRESS NOTE ADULT - SUBJECTIVE AND OBJECTIVE BOX
Significant recent/past 24 hr events:   2/26 received to CTICU s/p robotic Yfn Mehran Esophagogastrectomy and jejunostomy tube placement  2/27 j-tube placement confirmed with tube study and trickle feeds started    Subjective: c/o significant right chest pain and to a lesser degree abd discomfort earlier this morning which was unresolved until addition of ketorolac and is now largely resolved. "i think i passed some gas." Denies nausea, fever, chills, SOB, dizziness.    Review of Systems       ROS negative x 10 systems except as noted above      Patient is a 57y old  Male who presents with a chief complaint of Esophageal Cancer (12 Feb 2018 10:28)    HPI:  Pt is a 57 y.o male with esophageal cancer diagnosed 5 months ago with recent unintentional weight loss of about 40 lbs now scheduled for esophagogastroduodenoscopy (EGD) Robotic [place] Mehran esophagogastrectomy. (12 Feb 2018 09:27)    PAST MEDICAL & SURGICAL HISTORY:  Esophageal cancer  Weight loss, unintentional  Hodgkins lymphoma: 25 years ago  Acid reflux disease  No significant past surgical history    FAMILY HISTORY:  Family history of prostate cancer in father (Father)      Vitals   ICU Vital Signs Last 24 Hrs  T(C): 36.4 (27 Feb 2018 15:00), Max: 36.4 (27 Feb 2018 11:00)  T(F): 97.5 (27 Feb 2018 15:00), Max: 97.5 (27 Feb 2018 11:00)  HR: 81 (27 Feb 2018 15:00) (79 - 103), NSR  BP: 123/70 (27 Feb 2018 15:00) (95/56 - 159/78)  BP(mean): 91 (27 Feb 2018 15:00) (70 - 116)  ABP: 151/151 (27 Feb 2018 06:00) (108/59 - 174/81)  ABP(mean): 151 (27 Feb 2018 06:00) (72 - 151)  RR: 15 (27 Feb 2018 15:00) (11 - 46)  SpO2: 98% (27 Feb 2018 15:00) (75% - 100%) on 4L NC      I&O's Detail    26 Feb 2018 07:01  -  27 Feb 2018 07:00  --------------------------------------------------------  IN:    IV PiggyBack: 100 mL    lactated ringers.: 1500 mL  Total IN: 1600 mL    OUT:    Bulb: 50 mL    Chest Tube: 180 mL    Indwelling Catheter - Urethral: 660 mL  Total OUT: 890 mL    Total NET: 710 mL      27 Feb 2018 07:01  -  27 Feb 2018 16:37  --------------------------------------------------------  IN:    lactated ringers.: 1000 mL    Solution: 50 mL  Total IN: 1050 mL    OUT:    Bulb: 50 mL    Chest Tube: 100 mL    Indwelling Catheter - Urethral: 650 mL  Total OUT: 800 mL    Total NET: 250 mL      LABS             10.8   8.4   )-----------( 234      ( 27 Feb 2018 03:58 )             32.9     02-27    136  |  100  |  19.0  ----------------------------<  107  4.5   |  24.0  |  0.94    Ca    8.5<L>      27 Feb 2018 03:58  Phos  4.2     02-27  Mg     1.7     02-27    LIVER FUNCTIONS - ( 26 Feb 2018 23:01 )  Alb: 3.6 g/dL / Pro: 6.8 g/dL / ALK PHOS: 65 U/L / ALT: 31 U/L / AST: 65 U/L / GGT: x           PT/INR - ( 26 Feb 2018 23:01 )   PT: 11.7 sec;   INR: 1.06 ratio    PTT - ( 26 Feb 2018 23:01 )  PTT:27.2 sec      < from: Xray Abdomen 1 View PORTABLE -Urgent (02.27.18 @ 09:13) >  IMPRESSION: Jejunostomy tube within proximal jejunum. No extravasation. injection tube   < end of copied text >    < from: Xray Chest 1 View- PORTABLE-Routine (02.27.18 @ 05:36) >  FINDINGS:  The airway is midline.  Continued application of the OG tube, distal tip remains above the right hemidiaphragm. The 2 right chest tubes remain in place, unchanged in position. Continued application of the mediastinal chest tube.  There is no pleural effusion or pneumothorax.   The cardiac size cannot be evaluated on this AP portable study.   The bones are normal.   IMPRESSION:  No significant changes in overall appearance of the chest.   Findings as above.  < end of copied text >      MEDICATIONS  (STANDING):  enoxaparin Injectable 40 milliGRAM(s) SubCutaneous daily  fentaNYL (2 MICROgram(s)/mL) + BUpivacaine 0.0625%  in 0.9% Sodium Chloride PCEA 250 milliLiter(s) Epidural PCA Continuous  influenza   Vaccine 0.5 milliLiter(s) IntraMuscular once  lactated ringers. 1000 milliLiter(s) (125 mL/Hr) IV Continuous     MEDICATIONS  (PRN):  ketorolac   Injectable 15 milliGRAM(s) IV Push every 6 hours PRN Moderate Pain (4 - 6)      Allergies:  No Known Allergies        Physical Exam:   Constitutional: NAD, well-groomed, thin but well-developed  HEENT: + NGT in place. PERRLA, EOMI, no drainage or redness  Neck: No bruits; no thyromegaly or nodules,  No JVD  Back: Normal spine flexure, No CVA tenderness, No deformity or limitation of movement  Respiratory: Breath Sounds equal & clear bilaterally to auscultation, no accessory muscle use noted  Chest: minimal right crepitus/subQ air, right CT x 2, both with dsg C/D/I and no air leak noted.  Cardiovascular: Regular rate, regular rhythm, normal S1, S2; no murmurs or rub  Gastrointestinal: Soft, diffusely tender, port holes x 5 with steris. All sites C/D/I without erythema or heat.  Extremities: JOHNSON x 4, no peripheral edema, no cyanosis, no clubbing   Vascular: Equal and normal pulses: 2+ peripheral pulses throughout  Neurological: A+O x 3; speech clear and intact; no sensory, motor  deficits, normal reflexes  Psychiatric: calm, normal mood, normal affect  Musculoskeletal: No joint swelling or deformity; no limitation of movement  Skin: warm, dry, well perfused, no rashes

## 2018-02-27 NOTE — CONSULT NOTE ADULT - ASSESSMENT
HPI: 56 yo male with Pt is a 57 y.o male PMH significant for esophageal cancer diagnosed 5 months ago with recent unintentional weight loss of about 40 lbs. He is now POD #1 s/p Robot-assisted esophagectomy using da Rohit Si system  02/26/2018  and Jejunostomy feeding tube placement  02/26/2018  robotic assisted. He is currently using epidural cath, continuous rate. He is reporting pain is suboptimally controlled with regimen. Pain is currently 8/10 with medications increasing to 10/10 at times. He denies side effects from the medications. Currently NPO at this time with chest tube placed. He is reporting pain is mostly located across the abdomen, diffuse, where the drain is. Pain is cramping, shooting, stabbing. Denies numbness or tingling into lower extremities or upper extremities. He is able to move his extremities without difficulty. Garcia catheter and NGT in place. HPI: 56 yo male with PMH significant for esophageal cancer diagnosed 5 months ago with recent unintentional weight loss of about 40 lbs. He is now POD #1 s/p Robot-assisted esophagectomy using da Rohit Si system  02/26/2018  and Jejunostomy feeding tube placement  02/26/2018  robotic assisted. He is currently using epidural cath, continuous rate. He is reporting pain is suboptimally controlled with regimen. Pain is currently 8/10 with medications increasing to 10/10 at times. He denies side effects from the medications. Currently NPO at this time with chest tube placed. He is reporting pain is mostly located across the abdomen, diffuse, where the drain is. Pain is cramping, shooting, stabbing. Denies numbness or tingling into lower extremities or upper extremities. He is able to move his extremities without difficulty. Garcia catheter and NGT in place.

## 2018-02-27 NOTE — PROGRESS NOTE ADULT - PROBLEM SELECTOR PLAN 2
trickle feeds started after tube study obtained with goal to increase to desired rate as discussed with RD

## 2018-02-27 NOTE — PROGRESS NOTE ADULT - SUBJECTIVE AND OBJECTIVE BOX
SURGICAL PA NOTE:     STATUS POST:  robotic assisted esophagogastrectomy    Diagnosis:    Pre-Op Diagnosis:  Esophageal lesion  02/26/2018  distal esophageal lesion       Post-Op Dx:  Esophageal cancer  02/26/2018        Procedure:    Procedure:  Lymph node biopsy  02/26/2018  one frozen section bx. and 4 permanent biopsies EGD  02/26/2018     Robot-assisted esophagectomy using da Rohit Si system  02/26/2018     Jejunostomy feeding tube placement  02/26/2018  robotic assisted        Operative Findings:  · Operative Findings	distal esophogeal lesion	    Specimens/Blood Loss/IV/Output/Protocol/VTE:    Specimens/Blood Loss/IV/Output/Protocol/VTE:  · Specimens	1. frozen section, 2. 4 additional biopsies 3. esophagus ( portion of ) and stomach ( portion of )	  		  · Drains	left chest , SHELLI	  · Estimated Blood Loss	20 milliLiter(s)	      POST OPERATIVE DAY #: 1    Vital Signs Last 24 Hrs  T(C): 36.3 (26 Feb 2018 21:00), Max: 36.3 (26 Feb 2018 21:00)  T(F): 97.3 (26 Feb 2018 21:00), Max: 97.3 (26 Feb 2018 21:00)  HR: 87 (27 Feb 2018 09:00) (83 - 103)  BP: 141/82 (27 Feb 2018 09:00) (95/56 - 159/78)  BP(mean): 106 (27 Feb 2018 09:00) (70 - 116)  RR: 26 (27 Feb 2018 09:00) (14 - 46)  SpO2: 96% (27 Feb 2018 09:00) (75% - 100%)    HPI:  Pt is a 57 y.o male with esophageal cancer diagnosed 5 months ago with recent unintentional weight loss of about 40 lbs now scheduled for esophagogastroduodenoscopy (EGD) Robotic Yfn Mehran esophagogastrectomy. (12 Feb 2018 09:27)      Malignant neoplasm of esophagus  No h/o HF  Family history of prostate cancer in father (Father)  Handoff  Esophageal cancer  Weight loss, unintentional  Hodgkins lymphoma  Acid reflux disease  Esophageal cancer  Esophageal lesion  Acid reflux disease  Malignant neoplasm of esophagus, unspecified  Lymph node biopsy  Robot-assisted esophagectomy using da Rohit Si system  EGD  Jejunostomy feeding tube placement  No significant past surgical history  MALIGNANT NEOPLASM OF ESOPHAGU      SUBJECTIVE: Pt seen lying supine with HOB up, c/o abd pain, no nausea/vomiting, scant oral secretions - blood tinged    Diet: NPO    Activity: bedrest    Fevers: [ ]Yes [x ]NO  Chills: [ ] Yes [x ] NO  SOB:  [ ] YES [ x] NO  Dyspnea: [ ]YES [ x]NO  Chest Discomfort: [ ] YES [x ] NO    Nausea: [ ] YES [x ] NO           Vomiting: [ ] YES [ x] NO  Flatus: [ ] YES [ x] NO             Bowel Movement: [ ] YES [ x] NO  Diarrhea: [ ] YES [x ] NO         Void: [ x]YES [ ]No  Constipation: [ ] YES [ ] NO       Pain (0-10):    5          Pain Control Adequate: [x ] YES [ ] NO    Jose:    OMAIRAT:      I&O's Detail    26 Feb 2018 07:01  -  27 Feb 2018 07:00  --------------------------------------------------------  IN:    IV PiggyBack: 100 mL    lactated ringers.: 1500 mL  Total IN: 1600 mL    OUT:    Bulb: 50 mL    Chest Tube: 180 mL    Indwelling Catheter - Urethral: 660 mL  Total OUT: 890 mL    Total NET: 710 mL      27 Feb 2018 07:01  -  27 Feb 2018 10:33  --------------------------------------------------------  IN:    lactated ringers.: 250 mL    Solution: 50 mL  Total IN: 300 mL    OUT:    Bulb: 50 mL    Chest Tube: 30 mL    Indwelling Catheter - Urethral: 315 mL  Total OUT: 395 mL    Total NET: -95 mL        I&O's Summary    26 Feb 2018 07:01  -  27 Feb 2018 07:00  --------------------------------------------------------  IN: 1600 mL / OUT: 890 mL / NET: 710 mL    27 Feb 2018 07:01  -  27 Feb 2018 10:33  --------------------------------------------------------  IN: 300 mL / OUT: 395 mL / NET: -95 mL      I&O's Detail    26 Feb 2018 07:01  -  27 Feb 2018 07:00  --------------------------------------------------------  IN:    IV PiggyBack: 100 mL    lactated ringers.: 1500 mL  Total IN: 1600 mL    OUT:    Bulb: 50 mL    Chest Tube: 180 mL    Indwelling Catheter - Urethral: 660 mL  Total OUT: 890 mL    Total NET: 710 mL      27 Feb 2018 07:01  -  27 Feb 2018 10:33  --------------------------------------------------------  IN:    lactated ringers.: 250 mL    Solution: 50 mL  Total IN: 300 mL    OUT:    Bulb: 50 mL    Chest Tube: 30 mL    Indwelling Catheter - Urethral: 315 mL  Total OUT: 395 mL    Total NET: -95 mL          MEDICATIONS  (STANDING):  fentaNYL (2 MICROgram(s)/mL) + BUpivacaine 0.0625%  in 0.9% Sodium Chloride PCEA 250 milliLiter(s) Epidural PCA Continuous  influenza   Vaccine 0.5 milliLiter(s) IntraMuscular once  lactated ringers. 1000 milliLiter(s) (125 mL/Hr) IV Continuous <Continuous>    MEDICATIONS  (PRN):      LABS:                        10.8   8.4   )-----------( 234      ( 27 Feb 2018 03:58 )             32.9     02-27    136  |  100  |  19.0  ----------------------------<  107  4.5   |  24.0  |  0.94    Ca    8.5<L>      27 Feb 2018 03:58  Phos  4.2     02-27  Mg     1.7     02-27    TPro  6.8  /  Alb  3.6  /  TBili  0.6  /  DBili  0.2  /  AST  65<H>  /  ALT  31  /  AlkPhos  65  02-26    PT/INR - ( 26 Feb 2018 23:01 )   PT: 11.7 sec;   INR: 1.06 ratio         PTT - ( 26 Feb 2018 23:01 )  PTT:27.2 sec        RADIOLOGY & ADDITIONAL STUDIES:     EXAM:  XR ABDOMEN PORTABLE URGENT 1V                          PROCEDURE DATE:  02/27/2018          INTERPRETATION:   CLINICAL HISTORY: Jejunostomy tube check.    TECHNIQUE: A jejunostomy tube check was performed on    . Overhead AP   abdominal obtained following the administration of Gastroview via   jejunostomy tube.        FINDINGS:      . The bowel gas pattern is unremarkable. There is no  free air,   abnormal calcification, or soft tissue mass. No apparent bony abnormality.    Following contrast opacification, a jejunostomy catheter is present   within the proximal jejunum. There is no evidence of contrast   extravasation and contrast passes freely into the duodenum.    IMPRESSION:  Jejunostomy tube within proximal jejunum. No extravasation.

## 2018-02-27 NOTE — PROGRESS NOTE ADULT - SUBJECTIVE AND OBJECTIVE BOX
Patient is awake and Responsive to all stimuli  Vital signs are Stable resting High Fowlers @ time of visit  No Anesthesia Complications noted

## 2018-02-27 NOTE — PROGRESS NOTE ADULT - ASSESSMENT
57 year old male with esophageal cancer, Hodgkin's lymphoma, GERD. 2/26 s/p robotic [place] Mehran Esophagogastrectomy and jejunostomy tube placement.

## 2018-02-28 LAB
ANION GAP SERPL CALC-SCNC: 9 MMOL/L — SIGNIFICANT CHANGE UP (ref 5–17)
BUN SERPL-MCNC: 17 MG/DL — SIGNIFICANT CHANGE UP (ref 8–20)
CALCIUM SERPL-MCNC: 8.5 MG/DL — LOW (ref 8.6–10.2)
CHLORIDE SERPL-SCNC: 100 MMOL/L — SIGNIFICANT CHANGE UP (ref 98–107)
CO2 SERPL-SCNC: 29 MMOL/L — SIGNIFICANT CHANGE UP (ref 22–29)
CREAT SERPL-MCNC: 0.72 MG/DL — SIGNIFICANT CHANGE UP (ref 0.5–1.3)
GLUCOSE SERPL-MCNC: 89 MG/DL — SIGNIFICANT CHANGE UP (ref 70–115)
HCT VFR BLD CALC: 36.5 % — LOW (ref 42–52)
HGB BLD-MCNC: 11.8 G/DL — LOW (ref 14–18)
MAGNESIUM SERPL-MCNC: 2.1 MG/DL — SIGNIFICANT CHANGE UP (ref 1.6–2.6)
MCHC RBC-ENTMCNC: 29.4 PG — SIGNIFICANT CHANGE UP (ref 27–31)
MCHC RBC-ENTMCNC: 32.3 G/DL — SIGNIFICANT CHANGE UP (ref 32–36)
MCV RBC AUTO: 91 FL — SIGNIFICANT CHANGE UP (ref 80–94)
PLATELET # BLD AUTO: 214 K/UL — SIGNIFICANT CHANGE UP (ref 150–400)
POTASSIUM SERPL-MCNC: 4.7 MMOL/L — SIGNIFICANT CHANGE UP (ref 3.5–5.3)
POTASSIUM SERPL-SCNC: 4.7 MMOL/L — SIGNIFICANT CHANGE UP (ref 3.5–5.3)
RBC # BLD: 4.01 M/UL — LOW (ref 4.6–6.2)
RBC # FLD: 14.7 % — SIGNIFICANT CHANGE UP (ref 11–15.6)
SODIUM SERPL-SCNC: 138 MMOL/L — SIGNIFICANT CHANGE UP (ref 135–145)
WBC # BLD: 5.2 K/UL — SIGNIFICANT CHANGE UP (ref 4.8–10.8)
WBC # FLD AUTO: 5.2 K/UL — SIGNIFICANT CHANGE UP (ref 4.8–10.8)

## 2018-02-28 PROCEDURE — 71045 X-RAY EXAM CHEST 1 VIEW: CPT | Mod: 26

## 2018-02-28 RX ADMIN — Medication 15 MILLIGRAM(S): at 23:40

## 2018-02-28 RX ADMIN — Medication 15 MILLIGRAM(S): at 17:45

## 2018-02-28 RX ADMIN — Medication 15 MILLIGRAM(S): at 17:30

## 2018-02-28 RX ADMIN — Medication 15 MILLIGRAM(S): at 23:24

## 2018-02-28 RX ADMIN — Medication 15 MILLIGRAM(S): at 06:33

## 2018-02-28 RX ADMIN — SODIUM CHLORIDE 75 MILLILITER(S): 9 INJECTION, SOLUTION INTRAVENOUS at 08:30

## 2018-02-28 RX ADMIN — ENOXAPARIN SODIUM 40 MILLIGRAM(S): 100 INJECTION SUBCUTANEOUS at 12:25

## 2018-02-28 NOTE — PROGRESS NOTE ADULT - SUBJECTIVE AND OBJECTIVE BOX
Subjective: Pt resting in bed denies any Nausea, vomtting, fevers chills, syncope, SOB, chest, . Pt endorses some pain arround port sites. Pt endorses havign some gas but no bowel movement yet.     T(C): 37 (02-28-18 @ 04:00), Max: 37 (02-28-18 @ 04:00)  HR: 85 (02-28-18 @ 04:00) (76 - 107)  BP: 128/78 (02-28-18 @ 04:00) (121/76 - 158/88)  ABP: 151/151 (02-27-18 @ 06:00) (151/151 - 151/151)  ABP(mean): 151 (02-27-18 @ 06:00) (151 - 151)  RR: 12 (02-28-18 @ 04:00) (11 - 33)  SpO2: 97% (02-28-18 @ 04:00) (75% - 100%)  Wt(kg): --  CVP(mm Hg): --  CO: --  CI: --  PA: --      02-28    138  |  100  |  17.0  ----------------------------<  89  4.7   |  29.0  |  0.72    Ca    8.5<L>      28 Feb 2018 02:29  Phos  4.2     02-27  Mg     2.1     02-28    TPro  6.8  /  Alb  3.6  /  TBili  0.6  /  DBili  0.2  /  AST  65<H>  /  ALT  31  /  AlkPhos  65  02-26                               11.8   5.2   )-----------( 214      ( 28 Feb 2018 02:29 )             36.5        PT/INR - ( 26 Feb 2018 23:01 )   PT: 11.7 sec;   INR: 1.06 ratio         PTT - ( 26 Feb 2018 23:01 )  PTT:27.2 sec                       CAPILLARY BLOOD GLUCOSE           CXR: Pending imaging and review by radiology     I&O's Detail    26 Feb 2018 07:01  -  27 Feb 2018 07:00  --------------------------------------------------------  IN:    IV PiggyBack: 100 mL    lactated ringers.: 1500 mL  Total IN: 1600 mL    OUT:    Bulb: 50 mL    Chest Tube: 180 mL    Indwelling Catheter - Urethral: 660 mL  Total OUT: 890 mL    Total NET: 710 mL      27 Feb 2018 07:01  -  28 Feb 2018 05:15  --------------------------------------------------------  IN:    lactated ringers.: 2625 mL    Solution: 50 mL    Vital HN: 270 mL  Total IN: 2945 mL    OUT:    Bulb: 80 mL    Chest Tube: 240 mL    Indwelling Catheter - Urethral: 990 mL    Nasoenteral Tube: 150 mL  Total OUT: 1460 mL    Total NET: 1485 mL        Drug Dosing Weight  Height (cm): 180.34 (26 Feb 2018 05:46)  Weight (kg): 54 (26 Feb 2018 05:46)  BMI (kg/m2): 16.6 (26 Feb 2018 05:46)  BSA (m2): 1.69 (26 Feb 2018 05:46)    MEDICATIONS  (STANDING):  enoxaparin Injectable 40 milliGRAM(s) SubCutaneous daily  fentaNYL (2 MICROgram(s)/mL) + BUpivacaine 0.0625%  in 0.9% Sodium Chloride PCEA 250 milliLiter(s) Epidural PCA Continuous  influenza   Vaccine 0.5 milliLiter(s) IntraMuscular once  lactated ringers. 1000 milliLiter(s) (125 mL/Hr) IV Continuous <Continuous>    MEDICATIONS  (PRN):  ketorolac   Injectable 15 milliGRAM(s) IV Push every 6 hours PRN Moderate Pain (4 - 6)      Physical Exam  Neuro: AAOx3 in NAD  Pulm: CTA b/l  CV: RRR, normal S1/S2  Abd: NT/ND, positive bowel sounds  Extremities: DP 2+, no pitting edema   Incision(s):right chest tube site c.d.i , abdominal port site dressing c.d.i

## 2018-02-28 NOTE — PROGRESS NOTE ADULT - SUBJECTIVE AND OBJECTIVE BOX
Pt seen and examined at bedside. Pt is a 58 yo male with PMH significant for esophageal cancer diagnosed 5 months ago with recent unintentional weight loss of about 40 lbs. He is now POD #2 s/p Robot-assisted esophagectomy and Jejunostomy feeding tube placement. He is currently using PCEA, continuous rate. He is reporting pain is controlled with current regimen; currently 4/10 on VNRS. He denies side effects from the medications. Currently NPO at this time with chest tube placed. He is reporting pain is mostly located across the abdomen, diffuse, where the drain is. Pain is cramping, shooting, stabbing. Denies numbness or tingling into lower extremities or upper extremities. Denies SOB, CP    NAD, resting comfortably in bed.   VSS; afebrile  NPO    A/P: 56 y/o male s/p Esophagectomy and Jejunostomy feeding tube; POD #2  -Pain managed well with current PCEA settings. Continue current settings  -Monitor for sedation or respiratory depression  -Monitor for changes in sensation to lower extremities, upper extremities- insinuating that the cath has moved/not placed properly  -Will continue to monitor  [phone]

## 2018-02-28 NOTE — PROGRESS NOTE ADULT - ASSESSMENT
57 year old male with esophageal cancer, Hodgkin's lymphoma, GERD. 2/26 s/p robotic Thayne Mehran Esophagogastrectomy and jejunostomy tube placement.

## 2018-02-28 NOTE — DIETITIAN INITIAL EVALUATION ADULT. - OTHER INFO
Pt s/p robotic assisted espohagogastrectomy for esophageal ca.  As per H&P- pt has lost 40# since ca dx 5 months ago.  Pt currently tolerating Vital @ 35ml/hr via J-tube- will be advanced to 45ml/hr at 12p per RN, goal rate = 55m/hr.

## 2018-02-28 NOTE — CHART NOTE - NSCHARTNOTEFT_GEN_A_CORE
Upon Nutritional Assessment by the Registered Dietitian your patient was determined to meet criteria / has evidence of the following diagnosis/diagnoses:          [ ]  Mild Protein Calorie Malnutrition        [ ]  Moderate Protein Calorie Malnutrition        [x ] Severe Protein Calorie Malnutrition        [ ] Unspecified Protein Calorie Malnutrition        [ ] Underweight / BMI <19        [ ] Morbid Obesity / BMI > 40      Findings as based on:  •  Comprehensive nutrition assessment and consultation  •  Calorie counts (nutrient intake analysis)  •  Food acceptance and intake status from observations by staff  •  Follow up  •  Patient education  •  Intervention secondary to interdisciplinary rounds  •   concerns      Treatment:    The following diet has been recommended:  Continue increasing Vital 1.5 to goal rate of 55ml/hr via J-tube as tolerated    PROVIDER Section:     By signing this assessment you are acknowledging and agree with the diagnosis/diagnoses assigned by the Registered Dietitian    Comments:

## 2018-03-01 LAB
ANION GAP SERPL CALC-SCNC: 11 MMOL/L — SIGNIFICANT CHANGE UP (ref 5–17)
BUN SERPL-MCNC: 17 MG/DL — SIGNIFICANT CHANGE UP (ref 8–20)
CALCIUM SERPL-MCNC: 8 MG/DL — LOW (ref 8.6–10.2)
CHLORIDE SERPL-SCNC: 101 MMOL/L — SIGNIFICANT CHANGE UP (ref 98–107)
CO2 SERPL-SCNC: 26 MMOL/L — SIGNIFICANT CHANGE UP (ref 22–29)
CREAT SERPL-MCNC: 0.65 MG/DL — SIGNIFICANT CHANGE UP (ref 0.5–1.3)
GLUCOSE SERPL-MCNC: 117 MG/DL — HIGH (ref 70–115)
HCT VFR BLD CALC: 30.2 % — LOW (ref 42–52)
HGB BLD-MCNC: 10.1 G/DL — LOW (ref 14–18)
MAGNESIUM SERPL-MCNC: 1.8 MG/DL — SIGNIFICANT CHANGE UP (ref 1.6–2.6)
MCHC RBC-ENTMCNC: 29.9 PG — SIGNIFICANT CHANGE UP (ref 27–31)
MCHC RBC-ENTMCNC: 33.4 G/DL — SIGNIFICANT CHANGE UP (ref 32–36)
MCV RBC AUTO: 89.3 FL — SIGNIFICANT CHANGE UP (ref 80–94)
PLATELET # BLD AUTO: 191 K/UL — SIGNIFICANT CHANGE UP (ref 150–400)
POTASSIUM SERPL-MCNC: 3.8 MMOL/L — SIGNIFICANT CHANGE UP (ref 3.5–5.3)
POTASSIUM SERPL-SCNC: 3.8 MMOL/L — SIGNIFICANT CHANGE UP (ref 3.5–5.3)
RBC # BLD: 3.38 M/UL — LOW (ref 4.6–6.2)
RBC # FLD: 14.5 % — SIGNIFICANT CHANGE UP (ref 11–15.6)
SODIUM SERPL-SCNC: 138 MMOL/L — SIGNIFICANT CHANGE UP (ref 135–145)
WBC # BLD: 3.3 K/UL — LOW (ref 4.8–10.8)
WBC # FLD AUTO: 3.3 K/UL — LOW (ref 4.8–10.8)

## 2018-03-01 PROCEDURE — 71045 X-RAY EXAM CHEST 1 VIEW: CPT | Mod: 26,77

## 2018-03-01 PROCEDURE — 71250 CT THORAX DX C-: CPT | Mod: 26

## 2018-03-01 PROCEDURE — 71045 X-RAY EXAM CHEST 1 VIEW: CPT | Mod: 26

## 2018-03-01 RX ORDER — ACETAMINOPHEN 500 MG
1000 TABLET ORAL ONCE
Qty: 0 | Refills: 0 | Status: COMPLETED | OUTPATIENT
Start: 2018-03-01 | End: 2018-03-01

## 2018-03-01 RX ADMIN — Medication 1000 MILLIGRAM(S): at 20:20

## 2018-03-01 RX ADMIN — Medication 15 MILLIGRAM(S): at 05:40

## 2018-03-01 RX ADMIN — Medication 400 MILLIGRAM(S): at 19:47

## 2018-03-01 RX ADMIN — Medication 15 MILLIGRAM(S): at 11:10

## 2018-03-01 RX ADMIN — ENOXAPARIN SODIUM 40 MILLIGRAM(S): 100 INJECTION SUBCUTANEOUS at 12:43

## 2018-03-01 RX ADMIN — Medication 15 MILLIGRAM(S): at 12:00

## 2018-03-01 RX ADMIN — Medication 15 MILLIGRAM(S): at 05:28

## 2018-03-01 NOTE — PROGRESS NOTE ADULT - ASSESSMENT
57 year old male with esophageal cancer, Hodgkin's lymphoma, GERD. 2/26 s/p robotic [place] Mehran Esophagogastrectomy and jejunostomy tube placement.     Patient remains with Epidural Fentanyl PCA and tube feeds via J tube s/p gastrograffin study 2/27/2018. Right pleural chest tube dressing saturated, removed at bedside, likely leaking around tube site without obvious drainage when evaluated.

## 2018-03-01 NOTE — PROGRESS NOTE ADULT - PROBLEM SELECTOR PLAN 1
admitted to CTICU 2/26  NGT to LIWS  Do not manipulate, d/c or use NGT for feeds/meds  strict NPO (feeds per j-tube as below)  continue right CTs to water seal as ordered  continue PCEA per pain mgmt svc and toradol PRN  above including j-tube feeds as d/w Dr Blanco

## 2018-03-01 NOTE — PROGRESS NOTE ADULT - SUBJECTIVE AND OBJECTIVE BOX
Chief Complaint:    Patient seen and examined at bedside. patient has hx of esophageal cancer, s/p Robotic esophagectomy POD #3.  Patient resting in bed comfortably in NAD. States that he was in considerable pain earlier because he had to be transported for imaging. States pain was up to 8/10. It is now reduced to 6/10. He does report increased abdominal pain and pressure in the abdomen where his drain is. Pain is stabbing at times and takes his breath away.  Tolerating Fentanyl PCEA continuous rate with good relief in pain.   Denies any adverse drug reactions.   Currently NPO with chest tube in place.  Denies CP/SOB/dizziness/headache, bowel/bladder incontinence, saddle anesthesia      PAST MEDICAL & SURGICAL HISTORY:  Esophageal cancer  Weight loss, unintentional  Hodgkins lymphoma: 25 years ago  Acid reflux disease  No significant past surgical history      SOCIAL HISTORY:  [ ] Denies Smoking, Alcohol, or Drug Use    Allergies    No Known Allergies    Intolerances        PAIN MEDICATIONS:  fentaNYL (2 MICROgram(s)/mL) + BUpivacaine 0.0625%  in 0.9% Sodium Chloride PCEA 250 milliLiter(s) Epidural PCA Continuous    Heme:  enoxaparin Injectable 40 milliGRAM(s) SubCutaneous daily    Antibiotics:    Cardiac:    Pulmonary:    Endocrine    GI:    All Other Meds:  influenza   Vaccine 0.5 milliLiter(s) IntraMuscular once  lactated ringers. 1000 milliLiter(s) IV Continuous <Continuous>      LABS:                          10.1   3.3   )-----------( 191      ( 01 Mar 2018 04:51 )             30.2     03-01    138  |  101  |  17.0  ----------------------------<  117<H>  3.8   |  26.0  |  0.65    Ca    8.0<L>      01 Mar 2018 04:51  Mg     1.8     03-01            Drug Screen:        RADIOLOGY:    REVIEW OF SYSTEMS:  CONSTITUTIONAL: Neg  NECK: No pain or stiffness  RESPIRATORY: No cough, wheezing, chills or hemoptysis; No shortness of breath  CARDIOVASCULAR: No chest pain, palpitations, dizziness, or leg swelling  GASTROINTESTINAL: +abdominal pressure  GENITOURINARY: No dysuria, frequency, hematuria, or incontinence  NEUROLOGICAL: No headaches, memory loss, loss of strength, numbness, or tremors  SKIN: No itching, burning, rashes, or lesions   LYMPH NODES: No enlarged glands  MUSCULOSKELETAL: No joint pain or swelling; No muscle, back, or extremity pain  PSYCHIATRIC: No depression, anxiety, mood swings, or difficulty sleeping  HEME/LYMPH: No easy bruising, or bleeding gums    Vital Signs Last 24 Hrs  T(C): 36.7 (01 Mar 2018 07:00), Max: 37.1 (28 Feb 2018 13:39)  T(F): 98 (01 Mar 2018 07:00), Max: 98.8 (01 Mar 2018 04:00)  HR: 94 (01 Mar 2018 12:00) (90 - 109)  BP: 131/87 (01 Mar 2018 11:00) (105/68 - 148/84)  BP(mean): 104 (01 Mar 2018 11:00) (77 - 110)  RR: 20 (01 Mar 2018 12:00) (13 - 84)  SpO2: 68% (01 Mar 2018 12:00) (68% - 99%)    PAIN SCORE:    6/10     SCALE USED: (1-10)      PHYSICAL EXAM:    GENERAL: NAD, well-groomed, well-developed  HEAD:  Atraumatic, Normocephalic  ENMT: NGT in place  NECK: Supple, No JVD, Normal thyroid  NERVOUS SYSTEM:  Alert & Oriented X3, Good concentration; Motor Strength 5/5 B/L upper and lower extremities; DTRs 2+ intact and symmetric  CHEST/LUNG: non labored breathing, Right chest tube in place  HEART: Regular rate and rhythm;  ABDOMEN: non tender non distended, SHELLI in place  EXTREMITIES:  2+ Peripheral Pulses, No clubbing, cyanosis, or edema  LYMPH: No lymphadenopathy noted  SKIN: epidural cath dry, no leaking      [ ]  [place]  Reviewed and Copied to Chart

## 2018-03-01 NOTE — PROGRESS NOTE ADULT - ASSESSMENT
57 year old male with esophageal cancer, Hodgkin's lymphoma, GERD. 2/26 s/p robotic [place] Mehran Esophagogastrectomy and jejunostomy tube placement.  Patient remains with Epidural Fentanyl PCA.

## 2018-03-01 NOTE — PROGRESS NOTE ADULT - SUBJECTIVE AND OBJECTIVE BOX
Brief summary:  57 year old Male POD# 3 robotic melody marlon esophagectomy     Overnight events:  No acute events.     Past Medical History:  Malignant neoplasm of esophagus  No h/o HF  Family history of prostate cancer in father (Father)  Handoff  Esophageal cancer  Weight loss, unintentional  Hodgkins lymphoma  Acid reflux disease  Esophageal cancer  Esophageal lesion  Prophylactic measure  Jejunostomy tube in situ  Malignant neoplasm of esophagus, unspecified location  Post-operative pain  Acid reflux disease  Malignant neoplasm of esophagus, unspecified  Lymph node biopsy  Robot-assisted esophagectomy using da [employer] system  EGD  Jejunostomy feeding tube placement  No significant past surgical history  MALIGNANT NEOPLASM OF ESOPHAGU        enoxaparin Injectable 40 milliGRAM(s) SubCutaneous daily  fentaNYL (2 MICROgram(s)/mL) + BUpivacaine 0.0625%  in 0.9% Sodium Chloride PCEA 250 milliLiter(s) Epidural PCA Continuous  influenza   Vaccine 0.5 milliLiter(s) IntraMuscular once  ketorolac   Injectable 15 milliGRAM(s) IV Push every 6 hours PRN  lactated ringers. 1000 milliLiter(s) IV Continuous <Continuous>  MEDICATIONS  (PRN):  ketorolac   Injectable 15 milliGRAM(s) IV Push every 6 hours PRN Moderate Pain (4 - 6)      Daily     Daily Weight in k.9 (2018 09:53)                              11.8   5.2   )-----------( 214      ( 2018 02:29 )             36.5       138  |  100  |  17.0  ----------------------------<  89  4.7   |  29.0  |  0.72    Ca    8.5<L>      2018 02:29  Phos  4.2       Mg     2.1                   Objective:  T(C): 37 (18 @ 00:00), Max: 37.1 (18 @ 13:39)  HR: 102 (18 @ 01:00) (77 - 107)  BP: 122/71 (18 @ 01:00) (119/72 - 148/84)  RR: 84 (18 @ 01:00) (12 - 84)  SpO2: 95% (18 @ 01:00) (95% - 100%)  Wt(kg): --CAPILLARY BLOOD GLUCOSE      I&O's Summary    2018 07:  -  2018 07:00  --------------------------------------------------------  IN: 3425 mL / OUT: 1770 mL / NET: 1655 mL    2018 07:  -  01 Mar 2018 02:42  --------------------------------------------------------  IN: 2388 mL / OUT: 1115 mL / NET: 1273 mL        Physical Exam  Neuro: A+O x 3, non-focal, speech clear and intact  Pulm: CTA, equal bilaterally  CV: RRR, irregularly irregular, +S1S2  Abd: soft, NT, ND, +BS  Ext: +DP Pulses b/l, +PT pulses, no edema  Inc: right pleural chest tube in place , SHELLI bulb in place to suction

## 2018-03-01 NOTE — PROGRESS NOTE ADULT - SUBJECTIVE AND OBJECTIVE BOX
Pt seen and examined  NGT in place  TF at goal  Pain well controlled.  CT showed large gastric bubble - NGT was not on suction    AVSS  NAD  soft NT ND J tube in place    s/p robotic Yfn marlon  - swallow study as per thoracic  - J tube feeds at goal  - OOB/PT/chest PT  - pain control

## 2018-03-01 NOTE — PROGRESS NOTE ADULT - PROBLEM SELECTOR PLAN 1
Patient reports good pain control with current regimen. Pain currently 6/10. At this time no changes.  -Continue fentanyl PCEA current settings. Continue to monitor for increased sedation or respiratory depression.   -Monitor for changes in sensation to lower extremities, upper extremities.   -Reassess tomorrow. If pain is well controlled, will plan to discontinue PCEA Friday night/Saturday. Will need to hold lovenox for 12 hours prior to pulling catheter.  Will continue to follow.  Call with any pain questions. [phone]

## 2018-03-02 LAB
ANION GAP SERPL CALC-SCNC: 11 MMOL/L — SIGNIFICANT CHANGE UP (ref 5–17)
BUN SERPL-MCNC: 15 MG/DL — SIGNIFICANT CHANGE UP (ref 8–20)
CALCIUM SERPL-MCNC: 8 MG/DL — LOW (ref 8.6–10.2)
CHLORIDE SERPL-SCNC: 102 MMOL/L — SIGNIFICANT CHANGE UP (ref 98–107)
CO2 SERPL-SCNC: 26 MMOL/L — SIGNIFICANT CHANGE UP (ref 22–29)
CREAT SERPL-MCNC: 0.65 MG/DL — SIGNIFICANT CHANGE UP (ref 0.5–1.3)
GLUCOSE SERPL-MCNC: 115 MG/DL — SIGNIFICANT CHANGE UP (ref 70–115)
HCT VFR BLD CALC: 31.3 % — LOW (ref 42–52)
HGB BLD-MCNC: 10.2 G/DL — LOW (ref 14–18)
MAGNESIUM SERPL-MCNC: 1.7 MG/DL — SIGNIFICANT CHANGE UP (ref 1.6–2.6)
MCHC RBC-ENTMCNC: 29.7 PG — SIGNIFICANT CHANGE UP (ref 27–31)
MCHC RBC-ENTMCNC: 32.6 G/DL — SIGNIFICANT CHANGE UP (ref 32–36)
MCV RBC AUTO: 91 FL — SIGNIFICANT CHANGE UP (ref 80–94)
PLATELET # BLD AUTO: 221 K/UL — SIGNIFICANT CHANGE UP (ref 150–400)
POTASSIUM SERPL-MCNC: 3.8 MMOL/L — SIGNIFICANT CHANGE UP (ref 3.5–5.3)
POTASSIUM SERPL-SCNC: 3.8 MMOL/L — SIGNIFICANT CHANGE UP (ref 3.5–5.3)
RBC # BLD: 3.44 M/UL — LOW (ref 4.6–6.2)
RBC # FLD: 14.5 % — SIGNIFICANT CHANGE UP (ref 11–15.6)
SODIUM SERPL-SCNC: 139 MMOL/L — SIGNIFICANT CHANGE UP (ref 135–145)
WBC # BLD: 2.9 K/UL — LOW (ref 4.8–10.8)
WBC # FLD AUTO: 2.9 K/UL — LOW (ref 4.8–10.8)

## 2018-03-02 PROCEDURE — 71045 X-RAY EXAM CHEST 1 VIEW: CPT | Mod: 26

## 2018-03-02 RX ORDER — ACETAMINOPHEN 500 MG
1000 TABLET ORAL ONCE
Qty: 0 | Refills: 0 | Status: COMPLETED | OUTPATIENT
Start: 2018-03-02 | End: 2018-03-02

## 2018-03-02 RX ORDER — KETOROLAC TROMETHAMINE 30 MG/ML
15 SYRINGE (ML) INJECTION EVERY 6 HOURS
Qty: 0 | Refills: 0 | Status: DISCONTINUED | OUTPATIENT
Start: 2018-03-02 | End: 2018-03-03

## 2018-03-02 RX ORDER — MAGNESIUM SULFATE 500 MG/ML
2 VIAL (ML) INJECTION ONCE
Qty: 0 | Refills: 0 | Status: COMPLETED | OUTPATIENT
Start: 2018-03-02 | End: 2018-03-02

## 2018-03-02 RX ORDER — HYDROMORPHONE HYDROCHLORIDE 2 MG/ML
0.5 INJECTION INTRAMUSCULAR; INTRAVENOUS; SUBCUTANEOUS EVERY 6 HOURS
Qty: 0 | Refills: 0 | Status: DISCONTINUED | OUTPATIENT
Start: 2018-03-02 | End: 2018-03-03

## 2018-03-02 RX ORDER — KETOROLAC TROMETHAMINE 30 MG/ML
30 SYRINGE (ML) INJECTION ONCE
Qty: 0 | Refills: 0 | Status: DISCONTINUED | OUTPATIENT
Start: 2018-03-02 | End: 2018-03-02

## 2018-03-02 RX ADMIN — Medication 30 MILLIGRAM(S): at 02:45

## 2018-03-02 RX ADMIN — Medication 10 MILLIGRAM(S): at 08:50

## 2018-03-02 RX ADMIN — Medication 15 MILLIGRAM(S): at 14:16

## 2018-03-02 RX ADMIN — Medication 15 MILLIGRAM(S): at 23:01

## 2018-03-02 RX ADMIN — Medication 15 MILLIGRAM(S): at 09:05

## 2018-03-02 RX ADMIN — Medication 15 MILLIGRAM(S): at 22:44

## 2018-03-02 RX ADMIN — HYDROMORPHONE HYDROCHLORIDE 0.5 MILLIGRAM(S): 2 INJECTION INTRAMUSCULAR; INTRAVENOUS; SUBCUTANEOUS at 14:14

## 2018-03-02 RX ADMIN — Medication 15 MILLIGRAM(S): at 08:49

## 2018-03-02 RX ADMIN — Medication 30 MILLIGRAM(S): at 03:00

## 2018-03-02 RX ADMIN — Medication 50 GRAM(S): at 06:00

## 2018-03-02 RX ADMIN — SODIUM CHLORIDE 75 MILLILITER(S): 9 INJECTION, SOLUTION INTRAVENOUS at 14:21

## 2018-03-02 RX ADMIN — Medication 400 MILLIGRAM(S): at 01:58

## 2018-03-02 RX ADMIN — Medication 1000 MILLIGRAM(S): at 02:45

## 2018-03-02 RX ADMIN — Medication 15 MILLIGRAM(S): at 14:31

## 2018-03-02 RX ADMIN — ENOXAPARIN SODIUM 40 MILLIGRAM(S): 100 INJECTION SUBCUTANEOUS at 12:00

## 2018-03-02 RX ADMIN — HYDROMORPHONE HYDROCHLORIDE 0.5 MILLIGRAM(S): 2 INJECTION INTRAMUSCULAR; INTRAVENOUS; SUBCUTANEOUS at 14:31

## 2018-03-02 NOTE — ADVANCED PRACTICE NURSE CONSULT - RECOMMEDATIONS
Barriers/concerns identified and action taken:  1.  Financial and emotional support.  Referral made to [facility] and contact  information provided.  2. Lack of knowledge re: Plan of care.  Will discuss with Dr. Olson and obtain outside oncology records

## 2018-03-02 NOTE — PROGRESS NOTE ADULT - PROBLEM SELECTOR PLAN 1
Patient continues to c/o increased pain levels therefore we will continue Fentanyl PCEA at the current settings. Continue to monitor for increased sedation or respiratory depression.   -Monitor for changes in sensation to lower extremities, upper extremities.     START Dilaudid 0.5mg q6hr SQ prn break through pain  -Reassess tomorrow. If pain is well controlled, will plan to discontinue PCEA. Will need to hold lovenox for 12 hours prior to pulling catheter.    Will continue to follow.  Call with any pain questions. [phone].

## 2018-03-02 NOTE — PROGRESS NOTE ADULT - ASSESSMENT
57 year old male with esophageal cancer, Hodgkin's lymphoma, GERD. 2/26 s/p robotic [place] Mehran Esophagogastrectomy and jejunostomy tube placement.  Patient remains with Epidural Fentanyl PCA. Pain partially controlled.

## 2018-03-02 NOTE — ADVANCED PRACTICE NURSE CONSULT - ASSESSMENT
57 year old male with hx of esophageal cancer, s/p Robotic esophagectomy POD #4.  Pt states that "I am having a bad day and I am in a lot of pain right now".  ICU team aware of pt's increased abdominal pain today.  Pt states that he was diagnosed a few months ago and was treated with chemotherapy and radiation in [place] by Dr. Blanco and Dr. Diamond.  Will obtain and review records.  Pt has financial concerns and requires ongoing emotional support.   Call placed to Dr. Olson re: plan of care.  Awaiting call back to discuss.  Much emotional support provided at bedside.  Contact info provided.  Will con't to follow and provide support.

## 2018-03-02 NOTE — PROGRESS NOTE ADULT - PROBLEM SELECTOR PLAN 1
admitted to CTICU 2/26  NGT to LIWS  Do not manipulate, d/c or use NGT for feeds/meds  strict NPO (feeds per j-tube as below)  continue right CTs to water seal as ordered  continue PCEA per anesthesia   above including j-tube feeds as d/w Dr Blanco

## 2018-03-02 NOTE — PROGRESS NOTE ADULT - SUBJECTIVE AND OBJECTIVE BOX
Brief summary:  57 year old Male POD# 4 robotic melody marlon esophagectomy     Overnight events:  No acute events.       Past Medical History:  Malignant neoplasm of esophagus  No h/o HF  Family history of prostate cancer in father (Father)  Handoff  Esophageal cancer  Weight loss, unintentional  Hodgkins lymphoma  Acid reflux disease  Esophageal cancer  Esophageal lesion  Prophylactic measure  Jejunostomy tube in situ  Malignant neoplasm of esophagus, unspecified location  Post-operative pain  Acid reflux disease  Malignant neoplasm of esophagus, unspecified  Lymph node biopsy  Robot-assisted esophagectomy using da [employer] Si system  EGD  Jejunostomy feeding tube placement  No significant past surgical history  MALIGNANT NEOPLASM OF ESOPHAGU        enoxaparin Injectable 40 milliGRAM(s) SubCutaneous daily  fentaNYL (2 MICROgram(s)/mL) + BUpivacaine 0.0625%  in 0.9% Sodium Chloride PCEA 250 milliLiter(s) Epidural PCA Continuous  influenza   Vaccine 0.5 milliLiter(s) IntraMuscular once  lactated ringers. 1000 milliLiter(s) IV Continuous <Continuous>  MEDICATIONS  (PRN):      Daily     Daily                               10.1   3.3   )-----------( 191      ( 01 Mar 2018 04:51 )             30.2   03-01    138  |  101  |  17.0  ----------------------------<  117<H>  3.8   |  26.0  |  0.65    Ca    8.0<L>      01 Mar 2018 04:51  Mg     1.8     03-01              Objective:  T(C): 37.2 (03-01-18 @ 23:00), Max: 37.2 (03-01-18 @ 12:00)  HR: 98 (03-02-18 @ 02:00) (85 - 109)  BP: 149/77 (03-02-18 @ 02:00) (105/68 - 160/74)  RR: 17 (03-02-18 @ 02:00) (15 - 61)  SpO2: 97% (03-02-18 @ 02:00) (64% - 100%)  Wt(kg): --CAPILLARY BLOOD GLUCOSE      I&O's Summary    28 Feb 2018 07:01  -  01 Mar 2018 07:00  --------------------------------------------------------  IN: 3088 mL / OUT: 1315 mL / NET: 1773 mL    01 Mar 2018 07:01  -  02 Mar 2018 02:44  --------------------------------------------------------  IN: 2505 mL / OUT: 1577 mL / NET: 928 mL        Physical Exam  Neuro: A+O x 3, non-focal, speech clear and intact  Pulm: CTA, equal bilaterally  CV: RRR, no murmurs, +S1S2  Abd: soft, NT, ND, +BS  Ext: +DP Pulses b/l, +PT pulses, no edema  Inc: + RP tube, +SHELLI tube

## 2018-03-02 NOTE — CHART NOTE - NSCHARTNOTEFT_GEN_A_CORE
Pt complaining of "crampy" abdominal pain which is a different quality of pain than he has been experiencing. Call placed to Dr. Olson and a message was left. Awaiting return call.

## 2018-03-02 NOTE — PROGRESS NOTE ADULT - SUBJECTIVE AND OBJECTIVE BOX
Chief Complaint: abdominal pain    Patient seen and examined at bedside, in acute distress. Reports increased abdominal pain. Patient has hx of esophageal cancer, s/p Robotic esophagectomy POD #4. Patient currently rated an 8/10. Pain is stabbing at times and takes his breath away. Tolerating Fentanyl PCEA continuous rate with partial relief in pain. Denies any adverse drug reactions.  Currently NPO with chest tube in place. Denies CP/SOB/dizziness/headache, bowel/bladder incontinence, saddle anesthesia      PAST MEDICAL & SURGICAL HISTORY:  Esophageal cancer  Weight loss, unintentional  Hodgkins lymphoma: 25 years ago  Acid reflux disease  No significant past surgical history      SOCIAL HISTORY:  [ ] Denies Smoking, Alcohol, or Drug Use    Allergies  No Known Allergies            PAIN MEDICATIONS:  fentaNYL (2 MICROgram(s)/mL) + BUpivacaine 0.0625%  in 0.9% Sodium Chloride PCEA 250 milliLiter(s) Epidural PCA Continuous  ketorolac   Injectable 15 milliGRAM(s) IV Push every 6 hours    Heme:  enoxaparin Injectable 40 milliGRAM(s) SubCutaneous daily    Antibiotics:    Cardiac:    Pulmonary:    Endocrine    GI:  bisacodyl Suppository 10 milliGRAM(s) Rectal daily PRN    All Other Meds:  influenza   Vaccine 0.5 milliLiter(s) IntraMuscular once  lactated ringers. 1000 milliLiter(s) IV Continuous <Continuous>      LABS:                          10.2   2.9   )-----------( 221      ( 02 Mar 2018 04:26 )             31.3     03-02    139  |  102  |  15.0  ----------------------------<  115  3.8   |  26.0  |  0.65    Ca    8.0<L>      02 Mar 2018 04:26  Mg     1.7     03-02        REVIEW OF SYSTEMS:  CONSTITUTIONAL: Neg  NECK: No pain or stiffness  RESPIRATORY: No cough, wheezing, chills or hemoptysis; No shortness of breath  CARDIOVASCULAR: No chest pain, palpitations, dizziness, or leg swelling  GASTROINTESTINAL: +abdominal pressure  GENITOURINARY: No dysuria, frequency, hematuria, or incontinence  NEUROLOGICAL: No headaches, memory loss, loss of strength, numbness, or tremors  SKIN: No itching, burning, rashes, or lesions   LYMPH NODES: No enlarged glands  MUSCULOSKELETAL: No joint pain or swelling; No muscle, back, or extremity pain  PSYCHIATRIC: No depression, anxiety, mood swings, or difficulty sleeping  HEME/LYMPH: No easy bruising, or bleeding gums    Vital Signs Last 24 Hrs  T(C): 37.1 (02 Mar 2018 07:30), Max: 37.3 (02 Mar 2018 06:00)  T(F): 98.7 (02 Mar 2018 07:30), Max: 99.1 (02 Mar 2018 06:00)  HR: 98 (02 Mar 2018 11:00) (85 - 110)  BP: 126/66 (02 Mar 2018 11:00) (119/48 - 160/74)  BP(mean): 90 (02 Mar 2018 11:00) (69 - 112)  RR: 22 (02 Mar 2018 11:00) (15 - 48)  SpO2: 100% (02 Mar 2018 11:00) (64% - 100%)    PAIN SCORE:   8      SCALE USED: (1-10)    PHYSICAL EXAM:    GENERAL: NAD, well-groomed, well-developed  HEAD:  Atraumatic, Normocephalic  ENMT: NGT in place  NECK: Supple, No JVD, Normal thyroid  NERVOUS SYSTEM:  Alert & Oriented X3  CHEST/LUNG: non labored breathing, Right chest tube in place  HEART: Regular rate and rhythm;  ABDOMEN: non tender non distended, SHELLI in place  EXTREMITIES:  2+ Peripheral Pulses, No clubbing, cyanosis, or edema  LYMPH: No lymphadenopathy noted  SKIN: epidural cath dry, no leaking

## 2018-03-02 NOTE — PROGRESS NOTE ADULT - PROBLEM SELECTOR PLAN 1
Continue tube feeds via J tube  Strict NPO  Continue NGT  Care as per CICU team  Will discuss with Dr. Olson

## 2018-03-02 NOTE — PROGRESS NOTE ADULT - SUBJECTIVE AND OBJECTIVE BOX
Subjective:  Pt complaining of abdominal pain despite PCA, offers no other complaints    STATUS POST:  Esophagectomy    POST OPERATIVE DAY #: 4    MEDICATIONS  (STANDING):  enoxaparin Injectable 40 milliGRAM(s) SubCutaneous daily  fentaNYL (2 MICROgram(s)/mL) + BUpivacaine 0.0625%  in 0.9% Sodium Chloride PCEA 250 milliLiter(s) Epidural PCA Continuous  influenza   Vaccine 0.5 milliLiter(s) IntraMuscular once  ketorolac   Injectable 15 milliGRAM(s) IV Push every 6 hours  lactated ringers. 1000 milliLiter(s) (75 mL/Hr) IV Continuous <Continuous>    MEDICATIONS  (PRN):  bisacodyl Suppository 10 milliGRAM(s) Rectal daily PRN Constipation      Vital Signs Last 24 Hrs  T(C): 37.1 (02 Mar 2018 07:30), Max: 37.3 (02 Mar 2018 06:00)  T(F): 98.7 (02 Mar 2018 07:30), Max: 99.1 (02 Mar 2018 06:00)  HR: 96 (02 Mar 2018 08:00) (85 - 110)  BP: 127/65 (02 Mar 2018 08:00) (119/48 - 160/74)  BP(mean): 91 (02 Mar 2018 08:00) (69 - 112)  RR: 18 (02 Mar 2018 08:00) (15 - 48)  SpO2: 99% (02 Mar 2018 08:00) (64% - 100%)    Physical Exam:    Constitutional: NAD, +NGT  HEENT: PERRL, EOMI  Neck: No JVD, FROM without pain  Chest: +R chest tube with no noted air leak  Gastrointestinal: Soft, tender over left side of the abdomen, incision sites with intact steri strips, no discharge, +Jtube  Neurological: A&O x 3; without gross deficit  Musculoskeletal: No joint pain, swelling, deformity, or point tenderness; no limitation of movement      LABS:                        10.2   2.9   )-----------( 221      ( 02 Mar 2018 04:26 )             31.3     03-02    139  |  102  |  15.0  ----------------------------<  115  3.8   |  26.0  |  0.65    Ca    8.0<L>      02 Mar 2018 04:26  Mg     1.7     03-02

## 2018-03-02 NOTE — PROGRESS NOTE ADULT - ASSESSMENT
57 year old male with esophageal cancer, Hodgkin's lymphoma, GERD. 2/26 s/p robotic Yfn Mehran Esophagogastrectomy and jejunostomy tube placement.     Patient remains with Epidural Fentanyl PCA and tube feeds via J tube placement confirmed on gastrograffin study 2/27/2018.   2/28 Right pleural chest tube dressing saturated, removed at bedside, likely leaking around tube site without obvious drainage when evaluated.   3/1 CXR ? change in right pleural space, CT chest done without significant findings, NGT secured to suction, CXR improved, patient feeling better

## 2018-03-03 LAB
ANION GAP SERPL CALC-SCNC: 10 MMOL/L — SIGNIFICANT CHANGE UP (ref 5–17)
ANISOCYTOSIS BLD QL: SLIGHT — SIGNIFICANT CHANGE UP
APTT BLD: 29.8 SEC — SIGNIFICANT CHANGE UP (ref 27.5–37.4)
BUN SERPL-MCNC: 17 MG/DL — SIGNIFICANT CHANGE UP (ref 8–20)
CALCIUM SERPL-MCNC: 8.1 MG/DL — LOW (ref 8.6–10.2)
CHLORIDE SERPL-SCNC: 98 MMOL/L — SIGNIFICANT CHANGE UP (ref 98–107)
CO2 SERPL-SCNC: 27 MMOL/L — SIGNIFICANT CHANGE UP (ref 22–29)
CREAT SERPL-MCNC: 0.62 MG/DL — SIGNIFICANT CHANGE UP (ref 0.5–1.3)
EOSINOPHIL NFR BLD AUTO: 5 % — SIGNIFICANT CHANGE UP (ref 0–6)
FIBRINOGEN PPP-MCNC: 938 MG/DL — CRITICAL HIGH (ref 310–510)
GAS PNL BLDA: SIGNIFICANT CHANGE UP
GLUCOSE SERPL-MCNC: 119 MG/DL — HIGH (ref 70–115)
HCT VFR BLD CALC: 20.1 % — CRITICAL LOW (ref 42–52)
HCT VFR BLD CALC: 30.3 % — LOW (ref 42–52)
HCT VFR BLD CALC: 31.6 % — LOW (ref 42–52)
HGB BLD-MCNC: 10.2 G/DL — LOW (ref 14–18)
HGB BLD-MCNC: 10.9 G/DL — LOW (ref 14–18)
HGB BLD-MCNC: 6.6 G/DL — CRITICAL LOW (ref 14–18)
HYPOCHROMIA BLD QL: SIGNIFICANT CHANGE UP
INR BLD: 1.2 RATIO — HIGH (ref 0.88–1.16)
LYMPHOCYTES # BLD AUTO: 19 % — LOW (ref 20–55)
MACROCYTES BLD QL: SLIGHT — SIGNIFICANT CHANGE UP
MAGNESIUM SERPL-MCNC: 1.9 MG/DL — SIGNIFICANT CHANGE UP (ref 1.6–2.6)
MCHC RBC-ENTMCNC: 29.7 PG — SIGNIFICANT CHANGE UP (ref 27–31)
MCHC RBC-ENTMCNC: 29.9 PG — SIGNIFICANT CHANGE UP (ref 27–31)
MCHC RBC-ENTMCNC: 30.4 PG — SIGNIFICANT CHANGE UP (ref 27–31)
MCHC RBC-ENTMCNC: 32.8 G/DL — SIGNIFICANT CHANGE UP (ref 32–36)
MCHC RBC-ENTMCNC: 33.7 G/DL — SIGNIFICANT CHANGE UP (ref 32–36)
MCHC RBC-ENTMCNC: 34.5 G/DL — SIGNIFICANT CHANGE UP (ref 32–36)
MCV RBC AUTO: 88.1 FL — SIGNIFICANT CHANGE UP (ref 80–94)
MCV RBC AUTO: 88.3 FL — SIGNIFICANT CHANGE UP (ref 80–94)
MCV RBC AUTO: 91 FL — SIGNIFICANT CHANGE UP (ref 80–94)
MICROCYTES BLD QL: SLIGHT — SIGNIFICANT CHANGE UP
MONOCYTES NFR BLD AUTO: 26 % — HIGH (ref 3–10)
NEUTROPHILS NFR BLD AUTO: 45 % — SIGNIFICANT CHANGE UP (ref 37–73)
NEUTS BAND # BLD: 5 % — SIGNIFICANT CHANGE UP (ref 0–8)
OVALOCYTES BLD QL SMEAR: SLIGHT — SIGNIFICANT CHANGE UP
PHOSPHATE SERPL-MCNC: 3.1 MG/DL — SIGNIFICANT CHANGE UP (ref 2.4–4.7)
PLAT MORPH BLD: NORMAL — SIGNIFICANT CHANGE UP
PLATELET # BLD AUTO: 139 K/UL — LOW (ref 150–400)
PLATELET # BLD AUTO: 141 K/UL — LOW (ref 150–400)
PLATELET # BLD AUTO: 234 K/UL — SIGNIFICANT CHANGE UP (ref 150–400)
POIKILOCYTOSIS BLD QL AUTO: SLIGHT — SIGNIFICANT CHANGE UP
POTASSIUM SERPL-MCNC: 4 MMOL/L — SIGNIFICANT CHANGE UP (ref 3.5–5.3)
POTASSIUM SERPL-SCNC: 4 MMOL/L — SIGNIFICANT CHANGE UP (ref 3.5–5.3)
PROTHROM AB SERPL-ACNC: 13.2 SEC — HIGH (ref 9.8–12.7)
RBC # BLD: 2.21 M/UL — LOW (ref 4.6–6.2)
RBC # BLD: 3.44 M/UL — LOW (ref 4.6–6.2)
RBC # BLD: 3.58 M/UL — LOW (ref 4.6–6.2)
RBC # FLD: 14.3 % — SIGNIFICANT CHANGE UP (ref 11–15.6)
RBC # FLD: 14.5 % — SIGNIFICANT CHANGE UP (ref 11–15.6)
RBC # FLD: 14.5 % — SIGNIFICANT CHANGE UP (ref 11–15.6)
RBC BLD AUTO: ABNORMAL
SCHISTOCYTES BLD QL AUTO: SLIGHT — SIGNIFICANT CHANGE UP
SODIUM SERPL-SCNC: 135 MMOL/L — SIGNIFICANT CHANGE UP (ref 135–145)
SPHEROCYTES BLD QL SMEAR: SLIGHT — SIGNIFICANT CHANGE UP
WBC # BLD: 1.7 K/UL — LOW (ref 4.8–10.8)
WBC # BLD: 2.1 K/UL — LOW (ref 4.8–10.8)
WBC # BLD: 2.6 K/UL — LOW (ref 4.8–10.8)
WBC # FLD AUTO: 1.7 K/UL — LOW (ref 4.8–10.8)
WBC # FLD AUTO: 2.1 K/UL — LOW (ref 4.8–10.8)
WBC # FLD AUTO: 2.6 K/UL — LOW (ref 4.8–10.8)

## 2018-03-03 PROCEDURE — 71045 X-RAY EXAM CHEST 1 VIEW: CPT | Mod: 26,76

## 2018-03-03 PROCEDURE — 74177 CT ABD & PELVIS W/CONTRAST: CPT | Mod: 26

## 2018-03-03 PROCEDURE — 71250 CT THORAX DX C-: CPT | Mod: 26

## 2018-03-03 RX ORDER — HYDROMORPHONE HYDROCHLORIDE 2 MG/ML
1 INJECTION INTRAMUSCULAR; INTRAVENOUS; SUBCUTANEOUS EVERY 4 HOURS
Qty: 0 | Refills: 0 | Status: DISCONTINUED | OUTPATIENT
Start: 2018-03-03 | End: 2018-03-04

## 2018-03-03 RX ORDER — HYDROMORPHONE HYDROCHLORIDE 2 MG/ML
0.5 INJECTION INTRAMUSCULAR; INTRAVENOUS; SUBCUTANEOUS ONCE
Qty: 0 | Refills: 0 | Status: DISCONTINUED | OUTPATIENT
Start: 2018-03-03 | End: 2018-03-03

## 2018-03-03 RX ORDER — OXYCODONE HYDROCHLORIDE 5 MG/1
10 TABLET ORAL
Qty: 0 | Refills: 0 | Status: DISCONTINUED | OUTPATIENT
Start: 2018-03-03 | End: 2018-03-03

## 2018-03-03 RX ORDER — OXYCODONE HYDROCHLORIDE 5 MG/1
5 TABLET ORAL
Qty: 0 | Refills: 0 | Status: DISCONTINUED | OUTPATIENT
Start: 2018-03-03 | End: 2018-03-03

## 2018-03-03 RX ORDER — HYDROMORPHONE HYDROCHLORIDE 2 MG/ML
0.5 INJECTION INTRAMUSCULAR; INTRAVENOUS; SUBCUTANEOUS EVERY 4 HOURS
Qty: 0 | Refills: 0 | Status: DISCONTINUED | OUTPATIENT
Start: 2018-03-03 | End: 2018-03-04

## 2018-03-03 RX ORDER — HYDROMORPHONE HYDROCHLORIDE 2 MG/ML
1 INJECTION INTRAMUSCULAR; INTRAVENOUS; SUBCUTANEOUS EVERY 6 HOURS
Qty: 0 | Refills: 0 | Status: DISCONTINUED | OUTPATIENT
Start: 2018-03-03 | End: 2018-03-04

## 2018-03-03 RX ADMIN — HYDROMORPHONE HYDROCHLORIDE 1 MILLIGRAM(S): 2 INJECTION INTRAMUSCULAR; INTRAVENOUS; SUBCUTANEOUS at 17:57

## 2018-03-03 RX ADMIN — HYDROMORPHONE HYDROCHLORIDE 0.5 MILLIGRAM(S): 2 INJECTION INTRAMUSCULAR; INTRAVENOUS; SUBCUTANEOUS at 22:00

## 2018-03-03 RX ADMIN — HYDROMORPHONE HYDROCHLORIDE 1 MILLIGRAM(S): 2 INJECTION INTRAMUSCULAR; INTRAVENOUS; SUBCUTANEOUS at 14:09

## 2018-03-03 RX ADMIN — HYDROMORPHONE HYDROCHLORIDE 0.5 MILLIGRAM(S): 2 INJECTION INTRAMUSCULAR; INTRAVENOUS; SUBCUTANEOUS at 21:46

## 2018-03-03 RX ADMIN — Medication 15 MILLIGRAM(S): at 11:44

## 2018-03-03 RX ADMIN — Medication 15 MILLIGRAM(S): at 10:07

## 2018-03-03 RX ADMIN — HYDROMORPHONE HYDROCHLORIDE 1 MILLIGRAM(S): 2 INJECTION INTRAMUSCULAR; INTRAVENOUS; SUBCUTANEOUS at 19:30

## 2018-03-03 RX ADMIN — HYDROMORPHONE HYDROCHLORIDE 1 MILLIGRAM(S): 2 INJECTION INTRAMUSCULAR; INTRAVENOUS; SUBCUTANEOUS at 18:13

## 2018-03-03 NOTE — PROGRESS NOTE ADULT - ASSESSMENT
57M s/p Robot-assisted esophagectomy using da [employer] Si system, jejunostomy feeding tube placement for esophageal cancer.    -NGT advanced confirmed placement with CXR and physical exam, pt speaking full sentences and oxygenating well (96% on RA), little suspicion for NGT in R mainstem.  -S&S on monday if he passes can remove NGT  - Repeat CBC at 1600 for downtrending WBC count (2.1)  - Epidural PCA removed pain management following  - NGT to low continous suction  - All other care as per CTICU

## 2018-03-03 NOTE — PROGRESS NOTE ADULT - PROBLEM SELECTOR PLAN 1
admitted to CTICU 2/26  NGT to LIWS  Do not manipulate, d/c or use NGT for feeds/meds  strict NPO (feeds per j-tube as below)  continue right CTs to water seal as ordered  PCEA removal .   above including j-tube feeds as d/w Dr Blanco

## 2018-03-03 NOTE — PROGRESS NOTE ADULT - SUBJECTIVE AND OBJECTIVE BOX
INTERVAL HPI/OVERNIGHT EVENTS:  Pt seen and examined at the bedside with Dr. Olson.  Pt states he is not having as much crampy abdominal pain that he was having.  Pt seems to maintain a lowgrade tachycardia around 105.  NGT was found to be not functioning, Dr. Olson pulled back the NGT and increased the suction until there was fluid coming through the tube.  Upon CXR the tube was found to be in the cervical esophagus.  I discussed the findings with Dr. Olson who advised me to advance the NGT 10cm he said to shut the suction off and advance the tube and the anastamosis should not be a problem.  Tube was advanced without resistance and CXR was repeated which showed the NGT tip further down into the esophagogastric pouch.  Pt tolerated the advancement well, he did happen to start to have an air leak from his R chest tube dressing that was then reinforced.      STATUS POST:  Lymph node biopsy  02/26/2018  one frozen section bx. and 4 permanent biopsies EGD  02/26/2018     Robot-assisted esophagectomy using da [employer] Si system  02/26/2018     Jejunostomy feeding tube placement  02/26/2018  robotic assisted     POST OPERATIVE DAY #: 5    SUBJECTIVE:  Pain (0-10):            Pain Control Adequate: [ x] YES [ ] NO  Nausea: [ ] YES [x ] NO            Vomiting: [ ] YES [x ] NO  Diarrhea: [ ] YES [ x] NO         Constipation: [ ] YES [x ] NO     Chest Pain: [ ] YES [x ] NO    SOB:  [ ] YES [x ] NO    MEDICATIONS  (STANDING):  influenza   Vaccine 0.5 milliLiter(s) IntraMuscular once  lactated ringers. 1000 milliLiter(s) (75 mL/Hr) IV Continuous <Continuous>    MEDICATIONS  (PRN):  bisacodyl Suppository 10 milliGRAM(s) Rectal daily PRN Constipation  HYDROmorphone  Injectable 0.5 milliGRAM(s) SubCutaneous every 4 hours PRN Mild Pain (1 - 3)  HYDROmorphone  Injectable 1 milliGRAM(s) SubCutaneous every 4 hours PRN Moderate Pain (4 - 6)  HYDROmorphone  Injectable 1 milliGRAM(s) IV Push every 6 hours PRN Severe Pain (7 - 10)      Vital Signs Last 24 Hrs  T(C): 37.4 (03 Mar 2018 04:00), Max: 37.7 (02 Mar 2018 16:05)  T(F): 99.3 (03 Mar 2018 04:00), Max: 99.9 (02 Mar 2018 16:05)  HR: 105 (03 Mar 2018 14:00) (96 - 120)  BP: 147/84 (03 Mar 2018 14:00) (109/55 - 157/78)  BP(mean): 109 (03 Mar 2018 14:00) (79 - 111)  RR: 34 (03 Mar 2018 14:00) (16 - 47)  SpO2: 90% (03 Mar 2018 14:00) (90% - 100%)    PHYSICAL EXAM:    Constitutional: NAD, resting comfortably, WNWD  Eyes: PERRLA, EOM intact   Head: NCAT  Neck: trachea midline, FROM  Respiratory: CTA b/l, no WRR  Cardiovascular: S1S2, RRR  Gastrointestinal: abd. soft, diffusely tender, dressings C/D/I, Feeding tube in place LUQ  Extremities: no LE edema b/l  Neurological: AOx3, sensation grossly intact  Skin: warm, dry, intact     I&O's Detail    02 Mar 2018 07:01  -  03 Mar 2018 07:00  --------------------------------------------------------  IN:    lactated ringers.: 1725 mL    Solution: 230 mL    Vital HN: 1265 mL  Total IN: 3220 mL    OUT:    Bulb: 29 mL    Chest Tube: 140 mL    Nasoenteral Tube: 400 mL    Voided: 450 mL  Total OUT: 1019 mL    Total NET: 2201 mL      03 Mar 2018 07:01  -  03 Mar 2018 15:03  --------------------------------------------------------  IN:    lactated ringers.: 525 mL    Oral Fluid: 240 mL    Solution: 50 mL    Vital HN: 385 mL  Total IN: 1200 mL    OUT:    Bulb: 8 mL    Voided: 100 mL  Total OUT: 108 mL    Total NET: 1092 mL    LABS:                        10.2   2.1   )-----------( 141      ( 03 Mar 2018 03:48 )             30.3     03-03    135  |  98  |  17.0  ----------------------------<  119<H>  4.0   |  27.0  |  0.62    Ca    8.1<L>      03 Mar 2018 03:48  Phos  3.1     03-03  Mg     1.9     03-03      PT/INR - ( 03 Mar 2018 03:48 )   PT: 13.2 sec;   INR: 1.20 ratio         PTT - ( 03 Mar 2018 03:48 )  PTT:29.8 sec

## 2018-03-03 NOTE — PROGRESS NOTE ADULT - SUBJECTIVE AND OBJECTIVE BOX
Brief summary:  57 year old Male POD# 5 robotic melody marlon esophagectomy     Overnight events:  No acute events.       Past Medical History:  Malignant neoplasm of esophagus  No h/o HF  Family history of prostate cancer in father (Father)  Handoff  Esophageal cancer  Weight loss, unintentional  Hodgkins lymphoma  Acid reflux disease  Esophageal cancer  Esophageal lesion  Prophylactic measure  Jejunostomy tube in situ  Malignant neoplasm of esophagus, unspecified location  Post-operative pain  Acid reflux disease  Malignant neoplasm of esophagus, unspecified  Lymph node biopsy  Robot-assisted esophagectomy using da [employer] system  EGD  Jejunostomy feeding tube placement  No significant past surgical history  MALIGNANT NEOPLASM OF ESOPHAGU      bisacodyl Suppository 10 milliGRAM(s) Rectal daily PRN  fentaNYL (2 MICROgram(s)/mL) + BUpivacaine 0.0625%  in 0.9% Sodium Chloride PCEA 250 milliLiter(s) Epidural PCA Continuous  HYDROmorphone  Injectable 0.5 milliGRAM(s) SubCutaneous every 6 hours PRN  influenza   Vaccine 0.5 milliLiter(s) IntraMuscular once  ketorolac   Injectable 15 milliGRAM(s) IV Push every 6 hours  lactated ringers. 1000 milliLiter(s) IV Continuous <Continuous>                            10.2   2.9   )-----------( 221      ( 02 Mar 2018 04:26 )             31.3       Hemoglobin: 10.2 g/dL (03-02 @ 04:26)  Hemoglobin: 10.1 g/dL (03-01 @ 04:51)  Hemoglobin: 11.8 g/dL (02-28 @ 02:29)  Hemoglobin: 10.8 g/dL (02-27 @ 03:58)  Hemoglobin: 10.8 g/dL (02-26 @ 23:00)      03-02    139  |  102  |  15.0  ----------------------------<  115  3.8   |  26.0  |  0.65    Ca    8.0<L>      02 Mar 2018 04:26  Mg     1.7     03-02      Creatinine Trend: 0.65<--, 0.65<--, 0.72<--, 0.94<--, 0.78<--    COAGS:           T(C): 37.7 (03-02-18 @ 16:05), Max: 37.7 (03-02-18 @ 16:05)  HR: 100 (03-03-18 @ 01:00) (93 - 110)  BP: 115/61 (03-03-18 @ 01:00) (101/55 - 157/78)  RR: 18 (03-03-18 @ 01:00) (16 - 47)  SpO2: 99% (03-03-18 @ 01:00) (95% - 100%)  Wt(kg): --    I&O's Summary    01 Mar 2018 07:01  -  02 Mar 2018 07:00  --------------------------------------------------------  IN: 3335 mL / OUT: 2735 mL / NET: 600 mL    02 Mar 2018 07:01  -  03 Mar 2018 01:42  --------------------------------------------------------  IN: 2100 mL / OUT: 333 mL / NET: 1767 mL        Physical Exam  Neuro: A+O x 3, non-focal, speech clear and intact  Pulm: CTA, equal bilaterally  CV: RRR, no murmurs, +S1S2  Abd: soft, NT, ND, +BS  Ext: +DP Pulses b/l, +PT pulses, no edema  Inc: + RP tube, +SHELLI tube

## 2018-03-03 NOTE — PROGRESS NOTE ADULT - ASSESSMENT
patient seen and examined at bedside with family member  POD # 4 esophagogastrectomy and jejunostomy     plan: removal of epidural catheter-lovenox  was hold for 12hours prior   start hydromorphone 0.5mg q4hrs subq prn mild pain   hydromorphone 1mg subq q4hrs prn moderate pain  hydromorphone 1mg IVP q6hrs PRN severe pain   crush oxycodone by J-tube not allowed due to j-tube clogging

## 2018-03-03 NOTE — CHART NOTE - NSCHARTNOTEFT_GEN_A_CORE
Just returned with patient from CT . Pt only able to tolerate 120ml out of the 360ml via NGT.   Began to have some episode of desat to 89% and feeling fullness/nauseous while laying flat.   Abdomen continues to be rigid and tender . Pt with c/o 9/10 abd pain despite 0.5 dilaudid.   CT consistent with leak at surgical anastamosis      CT CHEST: Thickening of the gastric pull through is again noted and may be   postsurgical. On the current exam, enteric contrast was given through the   nasogastric tube at the time of the examination. There is a small pocket of   extraluminal enteric contrast within the mediastinum in the subcarinal space   with surrounding fluid and air concerning for a leak from the surgical   anastomosis. Right-sided pleural drain is in place.     lactate 0.4     SW Dr. Olson who willl get back to me  Will continue to treat pt pain   Keep tube feeds off  HOB >45 degrees  Monitor vitals closely

## 2018-03-03 NOTE — CHART NOTE - NSCHARTNOTEFT_GEN_A_CORE
Called by CICU to evaluate patient for increasing abdominal tenderness.   Pt had NGT manipulated earlier today, and imaging revealed NGT within esophagus , so it was advanced and is in proper placement.  Some bloody output at first, but now light green minimal output.  Pt says he has been having the crampy abdominal pain over the last few days, on and off, but it has worsened over the last day and a half.   Denies nausea .     Vitals: HR 115bpm BP: 150/90   Abdomen rigid with peritoneal signs of involuntary guarding to light palpation especially over J tube insertion site.   J tube feeds at 30cc/hr.     Xray done earlier today is portable and performed with patient supine, and so not reliable for evaluating for free air    SW Dr. Olson  Will obtain CT of A/P with PO and IV contrast.  12oz of contrast to be given via NGT right before scan   remaining contrast to be given via J tube 1 hour before scan.  J tube feeds turned off.

## 2018-03-03 NOTE — PROGRESS NOTE ADULT - SUBJECTIVE AND OBJECTIVE BOX
Chief Complaint:    Patient seen and examined at bedside    PAST MEDICAL & SURGICAL HISTORY:  Esophageal cancer  Weight loss, unintentional  Hodgkins lymphoma: 25 years ago  Acid reflux disease  No significant past surgical history      SOCIAL HISTORY:  [ ] Denies Smoking, Alcohol, or Drug Use    Allergies    No Known Allergies    Intolerances        PAIN MEDICATIONS:  HYDROmorphone  Injectable 0.5 milliGRAM(s) SubCutaneous every 6 hours PRN  oxyCODONE    IR 5 milliGRAM(s) Oral every 3 hours PRN  oxyCODONE    IR 10 milliGRAM(s) Oral every 3 hours PRN    Heme:    Antibiotics:    Cardiac:    Pulmonary:    Endocrine    GI:  bisacodyl Suppository 10 milliGRAM(s) Rectal daily PRN    All Other Meds:  influenza   Vaccine 0.5 milliLiter(s) IntraMuscular once  lactated ringers. 1000 milliLiter(s) IV Continuous <Continuous>      LABS:                          10.2   2.1   )-----------( 141      ( 03 Mar 2018 03:48 )             30.3     03-03    135  |  98  |  17.0  ----------------------------<  119<H>  4.0   |  27.0  |  0.62    Ca    8.1<L>      03 Mar 2018 03:48  Phos  3.1     03-03  Mg     1.9     03-03      PT/INR - ( 03 Mar 2018 03:48 )   PT: 13.2 sec;   INR: 1.20 ratio         PTT - ( 03 Mar 2018 03:48 )  PTT:29.8 sec      Drug Screen:  not done       RADIOLOGY:    REVIEW OF SYSTEMS:  CONSTITUTIONAL: Neg  NECK: No pain or stiffness  RESPIRATORY: No cough, wheezing, chills or hemoptysis; No shortness of breath  CARDIOVASCULAR: No chest pain, palpitations, dizziness, or leg swelling  GASTROINTESTINAL:  abdominal pain    Vital Signs Last 24 Hrs  T(C): 37.4 (03 Mar 2018 04:00), Max: 37.7 (02 Mar 2018 16:05)  T(F): 99.3 (03 Mar 2018 04:00), Max: 99.9 (02 Mar 2018 16:05)  HR: 108 (03 Mar 2018 12:00) (95 - 120)  BP: 124/70 (03 Mar 2018 12:00) (109/55 - 157/78)  BP(mean): 91 (03 Mar 2018 12:00) (79 - 111)  RR: 24 (03 Mar 2018 12:00) (16 - 47)  SpO2: 97% (03 Mar 2018 12:00) (95% - 100%)    PAIN SCORE:  8       SCALE USED: (1-10)

## 2018-03-04 ENCOUNTER — TRANSCRIPTION ENCOUNTER (OUTPATIENT)
Age: 58
End: 2018-03-04

## 2018-03-04 LAB
ALBUMIN SERPL ELPH-MCNC: 2.7 G/DL — LOW (ref 3.3–5.2)
ALP SERPL-CCNC: 61 U/L — SIGNIFICANT CHANGE UP (ref 40–120)
ALT FLD-CCNC: 18 U/L — SIGNIFICANT CHANGE UP
AMYLASE P1 CFR SERPL: 30 U/L — LOW (ref 36–128)
ANION GAP SERPL CALC-SCNC: 11 MMOL/L — SIGNIFICANT CHANGE UP (ref 5–17)
AST SERPL-CCNC: 18 U/L — SIGNIFICANT CHANGE UP
BILIRUB DIRECT SERPL-MCNC: 0.2 MG/DL — SIGNIFICANT CHANGE UP (ref 0–0.3)
BILIRUB INDIRECT FLD-MCNC: 0.4 MG/DL — SIGNIFICANT CHANGE UP (ref 0.2–1)
BILIRUB SERPL-MCNC: 0.6 MG/DL — SIGNIFICANT CHANGE UP (ref 0.4–2)
BUN SERPL-MCNC: 12 MG/DL — SIGNIFICANT CHANGE UP (ref 8–20)
CALCIUM SERPL-MCNC: 8.6 MG/DL — SIGNIFICANT CHANGE UP (ref 8.6–10.2)
CHLORIDE SERPL-SCNC: 95 MMOL/L — LOW (ref 98–107)
CO2 SERPL-SCNC: 27 MMOL/L — SIGNIFICANT CHANGE UP (ref 22–29)
CREAT SERPL-MCNC: 0.58 MG/DL — SIGNIFICANT CHANGE UP (ref 0.5–1.3)
GLUCOSE SERPL-MCNC: 102 MG/DL — SIGNIFICANT CHANGE UP (ref 70–115)
GRAM STN FLD: SIGNIFICANT CHANGE UP
HCT VFR BLD CALC: 30.5 % — LOW (ref 42–52)
HGB BLD-MCNC: 10.1 G/DL — LOW (ref 14–18)
LIDOCAIN IGE QN: 10 U/L — LOW (ref 22–51)
MAGNESIUM SERPL-MCNC: 1.8 MG/DL — SIGNIFICANT CHANGE UP (ref 1.8–2.6)
MCHC RBC-ENTMCNC: 29 PG — SIGNIFICANT CHANGE UP (ref 27–31)
MCHC RBC-ENTMCNC: 33.1 G/DL — SIGNIFICANT CHANGE UP (ref 32–36)
MCV RBC AUTO: 87.6 FL — SIGNIFICANT CHANGE UP (ref 80–94)
PLATELET # BLD AUTO: 227 K/UL — SIGNIFICANT CHANGE UP (ref 150–400)
POTASSIUM SERPL-MCNC: 3.9 MMOL/L — SIGNIFICANT CHANGE UP (ref 3.5–5.3)
POTASSIUM SERPL-SCNC: 3.9 MMOL/L — SIGNIFICANT CHANGE UP (ref 3.5–5.3)
PROT SERPL-MCNC: 6.3 G/DL — LOW (ref 6.6–8.7)
RBC # BLD: 3.48 M/UL — LOW (ref 4.6–6.2)
RBC # FLD: 14.3 % — SIGNIFICANT CHANGE UP (ref 11–15.6)
SODIUM SERPL-SCNC: 133 MMOL/L — LOW (ref 135–145)
SPECIMEN SOURCE: SIGNIFICANT CHANGE UP
WBC # BLD: 3.9 K/UL — LOW (ref 4.8–10.8)
WBC # FLD AUTO: 3.9 K/UL — LOW (ref 4.8–10.8)

## 2018-03-04 PROCEDURE — 71045 X-RAY EXAM CHEST 1 VIEW: CPT | Mod: 26

## 2018-03-04 RX ORDER — VANCOMYCIN HCL 1 G
VIAL (EA) INTRAVENOUS
Qty: 0 | Refills: 0 | Status: DISCONTINUED | OUTPATIENT
Start: 2018-03-04 | End: 2018-03-05

## 2018-03-04 RX ORDER — HYDROMORPHONE HYDROCHLORIDE 2 MG/ML
30 INJECTION INTRAMUSCULAR; INTRAVENOUS; SUBCUTANEOUS
Qty: 0 | Refills: 0 | Status: DISCONTINUED | OUTPATIENT
Start: 2018-03-04 | End: 2018-03-08

## 2018-03-04 RX ORDER — ONDANSETRON 8 MG/1
4 TABLET, FILM COATED ORAL EVERY 6 HOURS
Qty: 0 | Refills: 0 | Status: DISCONTINUED | OUTPATIENT
Start: 2018-03-04 | End: 2018-03-09

## 2018-03-04 RX ORDER — ACETAMINOPHEN 500 MG
1000 TABLET ORAL ONCE
Qty: 0 | Refills: 0 | Status: COMPLETED | OUTPATIENT
Start: 2018-03-04 | End: 2018-03-04

## 2018-03-04 RX ORDER — PIPERACILLIN AND TAZOBACTAM 4; .5 G/20ML; G/20ML
3.38 INJECTION, POWDER, LYOPHILIZED, FOR SOLUTION INTRAVENOUS EVERY 8 HOURS
Qty: 0 | Refills: 0 | Status: DISCONTINUED | OUTPATIENT
Start: 2018-03-04 | End: 2018-03-06

## 2018-03-04 RX ORDER — HYDROMORPHONE HYDROCHLORIDE 2 MG/ML
0.5 INJECTION INTRAMUSCULAR; INTRAVENOUS; SUBCUTANEOUS ONCE
Qty: 0 | Refills: 0 | Status: DISCONTINUED | OUTPATIENT
Start: 2018-03-04 | End: 2018-03-04

## 2018-03-04 RX ORDER — PANTOPRAZOLE SODIUM 20 MG/1
40 TABLET, DELAYED RELEASE ORAL DAILY
Qty: 0 | Refills: 0 | Status: COMPLETED | OUTPATIENT
Start: 2018-03-04 | End: 2018-03-04

## 2018-03-04 RX ORDER — PIPERACILLIN AND TAZOBACTAM 4; .5 G/20ML; G/20ML
3.38 INJECTION, POWDER, LYOPHILIZED, FOR SOLUTION INTRAVENOUS ONCE
Qty: 0 | Refills: 0 | Status: COMPLETED | OUTPATIENT
Start: 2018-03-04 | End: 2018-03-04

## 2018-03-04 RX ORDER — NALOXONE HYDROCHLORIDE 4 MG/.1ML
0.1 SPRAY NASAL
Qty: 0 | Refills: 0 | Status: DISCONTINUED | OUTPATIENT
Start: 2018-03-04 | End: 2018-03-08

## 2018-03-04 RX ORDER — ENOXAPARIN SODIUM 100 MG/ML
40 INJECTION SUBCUTANEOUS DAILY
Qty: 0 | Refills: 0 | Status: DISCONTINUED | OUTPATIENT
Start: 2018-03-04 | End: 2018-03-12

## 2018-03-04 RX ORDER — VANCOMYCIN HCL 1 G
1000 VIAL (EA) INTRAVENOUS EVERY 12 HOURS
Qty: 0 | Refills: 0 | Status: DISCONTINUED | OUTPATIENT
Start: 2018-03-04 | End: 2018-03-05

## 2018-03-04 RX ORDER — HYDROMORPHONE HYDROCHLORIDE 2 MG/ML
0.5 INJECTION INTRAMUSCULAR; INTRAVENOUS; SUBCUTANEOUS
Qty: 0 | Refills: 0 | Status: DISCONTINUED | OUTPATIENT
Start: 2018-03-04 | End: 2018-03-05

## 2018-03-04 RX ORDER — VANCOMYCIN HCL 1 G
1000 VIAL (EA) INTRAVENOUS ONCE
Qty: 0 | Refills: 0 | Status: COMPLETED | OUTPATIENT
Start: 2018-03-04 | End: 2018-03-04

## 2018-03-04 RX ORDER — PANTOPRAZOLE SODIUM 20 MG/1
40 TABLET, DELAYED RELEASE ORAL DAILY
Qty: 0 | Refills: 0 | Status: DISCONTINUED | OUTPATIENT
Start: 2018-03-05 | End: 2018-03-14

## 2018-03-04 RX ADMIN — HYDROMORPHONE HYDROCHLORIDE 30 MILLILITER(S): 2 INJECTION INTRAMUSCULAR; INTRAVENOUS; SUBCUTANEOUS at 19:18

## 2018-03-04 RX ADMIN — Medication 250 MILLIGRAM(S): at 01:11

## 2018-03-04 RX ADMIN — PIPERACILLIN AND TAZOBACTAM 25 GRAM(S): 4; .5 INJECTION, POWDER, LYOPHILIZED, FOR SOLUTION INTRAVENOUS at 05:25

## 2018-03-04 RX ADMIN — Medication 400 MILLIGRAM(S): at 00:14

## 2018-03-04 RX ADMIN — HYDROMORPHONE HYDROCHLORIDE 0.5 MILLIGRAM(S): 2 INJECTION INTRAMUSCULAR; INTRAVENOUS; SUBCUTANEOUS at 10:20

## 2018-03-04 RX ADMIN — HYDROMORPHONE HYDROCHLORIDE 1 MILLIGRAM(S): 2 INJECTION INTRAMUSCULAR; INTRAVENOUS; SUBCUTANEOUS at 01:11

## 2018-03-04 RX ADMIN — HYDROMORPHONE HYDROCHLORIDE 1 MILLIGRAM(S): 2 INJECTION INTRAMUSCULAR; INTRAVENOUS; SUBCUTANEOUS at 06:27

## 2018-03-04 RX ADMIN — PIPERACILLIN AND TAZOBACTAM 25 GRAM(S): 4; .5 INJECTION, POWDER, LYOPHILIZED, FOR SOLUTION INTRAVENOUS at 14:14

## 2018-03-04 RX ADMIN — ENOXAPARIN SODIUM 40 MILLIGRAM(S): 100 INJECTION SUBCUTANEOUS at 11:48

## 2018-03-04 RX ADMIN — PANTOPRAZOLE SODIUM 40 MILLIGRAM(S): 20 TABLET, DELAYED RELEASE ORAL at 01:11

## 2018-03-04 RX ADMIN — HYDROMORPHONE HYDROCHLORIDE 1 MILLIGRAM(S): 2 INJECTION INTRAMUSCULAR; INTRAVENOUS; SUBCUTANEOUS at 06:01

## 2018-03-04 RX ADMIN — HYDROMORPHONE HYDROCHLORIDE 0.5 MILLIGRAM(S): 2 INJECTION INTRAMUSCULAR; INTRAVENOUS; SUBCUTANEOUS at 10:06

## 2018-03-04 RX ADMIN — Medication 250 MILLIGRAM(S): at 17:23

## 2018-03-04 RX ADMIN — HYDROMORPHONE HYDROCHLORIDE 30 MILLILITER(S): 2 INJECTION INTRAMUSCULAR; INTRAVENOUS; SUBCUTANEOUS at 10:16

## 2018-03-04 RX ADMIN — PIPERACILLIN AND TAZOBACTAM 25 GRAM(S): 4; .5 INJECTION, POWDER, LYOPHILIZED, FOR SOLUTION INTRAVENOUS at 22:31

## 2018-03-04 RX ADMIN — PIPERACILLIN AND TAZOBACTAM 200 GRAM(S): 4; .5 INJECTION, POWDER, LYOPHILIZED, FOR SOLUTION INTRAVENOUS at 01:11

## 2018-03-04 RX ADMIN — HYDROMORPHONE HYDROCHLORIDE 1 MILLIGRAM(S): 2 INJECTION INTRAMUSCULAR; INTRAVENOUS; SUBCUTANEOUS at 00:18

## 2018-03-04 RX ADMIN — Medication 1000 MILLIGRAM(S): at 00:15

## 2018-03-04 NOTE — PROGRESS NOTE ADULT - PROBLEM SELECTOR PLAN 1
admitted to CTICU 2/26  NGT to LIWS  Do not manipulate, d/c or use NGT for feeds/meds  strict NPO   above including j-tube feeds as d/w Dr Blanco  HOB >45 degrees for aspiration precaution.

## 2018-03-04 NOTE — PROGRESS NOTE ADULT - ASSESSMENT
57 year old male with esophageal cancer, Hodgkin's lymphoma, GERD. 2/26 s/p robotic Honaker Mehran Esophagogastrectomy and jejunostomy tube placement.     J tube placement confirmed on gastrograffin study 2/27/2018.   2/28 Right pleural chest tube dressing saturated, removed at bedside, likely leaking around tube site without obvious drainage when evaluated.   3/1 CXR ? change in right pleural space, CT chest done without significant findings, NGT secured to suction, CXR improved, patient feeling better   3/3 - worsening abdominal pain, CT chest suspicious for leak, some free IP air as expected after abdominal surgery, less than on CT performed 3/1, CT Abd- no acute abdominal findings on preliminary report.    -Pan Culture  -Vanco/Zosyn as per Dr. Blanco  -Hold J-tube feeds   -cont NGT to suction  -Pain control - IV/Subcutaneous dilaudid PRN  -Gen Surg f/u appreciated (SANAZ Aviles spoke with Dr. Olson)  Discussed events and plan with Dr. Blanco who agrees with course of action.

## 2018-03-04 NOTE — PROGRESS NOTE ADULT - SUBJECTIVE AND OBJECTIVE BOX
Brief summary:  57 year old Male POD# 6 robotic melody marlon esophagectomy, complaining of abdominal pain.  + flatus, small BM yesterday.  Pain localized to upper quadrants, not relieved by change in position, some relief with parenteral dilaudid.      Overnight events: worsening abdominal pain, fever to 101.9, CT Chest/abdomen performed.        Past Medical History:  Malignant neoplasm of esophagus  No h/o HF  Family history of prostate cancer in father (Father)  Handoff  Esophageal cancer  Weight loss, unintentional  Hodgkins lymphoma  Acid reflux disease  Esophageal cancer  Esophageal lesion  Prophylactic measure  Jejunostomy tube in situ  Malignant neoplasm of esophagus, unspecified location  Post-operative pain  Acid reflux disease  Malignant neoplasm of esophagus, unspecified  Lymph node biopsy  Robot-assisted esophagectomy using da Voxli system  EGD  Jejunostomy feeding tube placement  No significant past surgical history  MALIGNANT NEOPLASM OF ESOPHAGU      MEDICATIONS  (STANDING):  lactated ringers. 1000 milliLiter(s) (75 mL/Hr) IV Continuous <Continuous>  piperacillin/tazobactam IVPB. 3.375 Gram(s) IV Intermittent every 8 hours  vancomycin  IVPB 1000 milliGRAM(s) IV Intermittent every 12 hours  vancomycin  IVPB        MEDICATIONS  (PRN):  bisacodyl Suppository 10 milliGRAM(s) Rectal daily PRN Constipation  HYDROmorphone  Injectable 0.5 milliGRAM(s) SubCutaneous every 4 hours PRN Mild Pain (1 - 3)  HYDROmorphone  Injectable 1 milliGRAM(s) SubCutaneous every 4 hours PRN Moderate Pain (4 - 6)  HYDROmorphone  Injectable 1 milliGRAM(s) IV Push every 6 hours PRN Severe Pain (7 - 10)                          10.9   2.6   )-----------( 234      ( 03 Mar 2018 17:46 )             31.6       135  |  98  |  17.0  ----------------------------<  119<H>  4.0   |  27.0  |  0.62    lactate 0.4    ICU Vital Signs Last 24 Hrs  T(C): 38.8 (03 Mar 2018 23:43), Max: 38.8 (03 Mar 2018 23:43)  T(F): 101.9 (03 Mar 2018 23:43), Max: 101.9 (03 Mar 2018 23:43)  HR: 99 (04 Mar 2018 01:00) (97 - 120)  BP: 139/73 (04 Mar 2018 01:00) (114/56 - 166/85)  BP(mean): 98 (04 Mar 2018 01:00) (80 - 115)  ABP: --  ABP(mean): --  RR: 17 (04 Mar 2018 01:00) (16 - 34)  SpO2: 98% (04 Mar 2018 01:00) (90% - 100%)    3/3/18:  CT CHEST: Thickening of the gastric pull through is again noted and may be   postsurgical. On the current exam, enteric contrast was given through the   nasogastric tube at the time of the examination. There is a small pocket of   extraluminal enteric contrast within the mediastinum in the subcarinal space   with surrounding fluid and air concerning for a leak from the surgical   anastomosis. Right-sided pleural drain is in place.     CT abdomen/pelvis: Small to moderate volume of free intraperitoneal air   within the upper abdomen may be within normal limits in the postoperative   period. No abnormal thickening of large or small bowel loops within the   abdomen. Note bowel obstruction.     I&O's Summary      Physical Exam  appears more comfortable, was clearly in pain at beginning of shift.    Neuro: A+O x 3, non-focal, speech clear and intact  Pulm: CTA, equal bilaterally  CV: RRR, no murmurs, +S1S2  Abd: 8pm - abdomen firm, tender to palpation in upper quadrants, patient pulling knees to chest to get comfortable.  1am - clearly more comfortable, less tender.  Ext: +DP Pulses b/l, +PT pulses, no edema  Inc: Abdominal robotic incisions C/D/I, no hernias, no erythema.  R chest wall incision c/d/i.  R pleural carrie to bulb suction, about 50cc output for 24 hours.    NGT to wall suction - 400cc day, 300cc so far tonight.

## 2018-03-04 NOTE — PROGRESS NOTE ADULT - ASSESSMENT
57 year old male with esophageal cancer, Hodgkin's lymphoma, GERD. 2/26 s/p robotic Yfn Mehran Esophagogastrectomy and jejunostomy tube placement. CT findings concerning for anastamosis leak, pt hemodynamically normal  - cont NGT to low continuous suction  - recommend dilaudid PCA  - strict NPO   - HOB >45 degrees   - all other care as per primary  - Discussed with Dr. Olson who will be seeing the pt today 57 year old male with esophageal cancer, Hodgkin's lymphoma, GERD. 2/26 s/p robotic Yfn Mehran Esophagogastrectomy and jejunostomy tube placement. CT findings concerning for anastamosis leak, pt hemodynamically normal  - cont NGT to low continuous suction  - flush J tube with 20cc of NS q6  - recommend dilaudid PCA  - strict NPO   - HOB >45 degrees   - all other care as per primary  - Discussed with Dr. Olson who will be seeing the pt today

## 2018-03-04 NOTE — PROGRESS NOTE ADULT - SUBJECTIVE AND OBJECTIVE BOX
INTERVAL HPI/OVERNIGHT EVENTS:  Pt seen and examined at the bedside.  CT scan of the chest/abd/pelvis was done last night for an increasing in abdominal tenderness which showed findings consistent with a leak at the surgical anastamosis.  pt had spiked a temp of 101.9 rectally ON but has returned to normal since then.  Pt was pan cultured and started on abx.  400cc of dark green output over the past 24 hours.  Pt is still complaining of some abdominal pain this morning but states to me that it has been the same for the past couple of days.  Pt was on RA when examined and satting around 90, 2L NC was placed on and the pt remained in the low 90s but with deep breathing exercises during examination increased to .      STATUS POST:  Lymph node biopsy  02/26/2018  one frozen section bx. and 4 permanent biopsies EGD  02/26/2018     Robot-assisted esophagectomy using da [employer] Si system  02/26/2018     Jejunostomy feeding tube placement  02/26/2018  robotic assisted    POST OPERATIVE DAY #: 6    SUBJECTIVE:  Flatus: [x ] YES [ ] NO             Bowel Movement: [x ] YES [ ] NO  Pain (0-10):            Pain Control Adequate: [ ] YES [x ] NO  Nausea: [ ] YES [ x] NO            Vomiting: [ ] YES [x ] NO  Diarrhea: [ ] YES [x ] NO         Constipation: [ ] YES [x ] NO     Chest Pain: [ ] YES [x ] NO    SOB:  [ ] YES [x ] NO    MEDICATIONS  (STANDING):  enoxaparin Injectable 40 milliGRAM(s) SubCutaneous daily  HYDROmorphone PCA (1 mG/mL) 30 milliLiter(s) PCA Continuous PCA Continuous  lactated ringers. 1000 milliLiter(s) (75 mL/Hr) IV Continuous <Continuous>  piperacillin/tazobactam IVPB. 3.375 Gram(s) IV Intermittent every 8 hours  vancomycin  IVPB 1000 milliGRAM(s) IV Intermittent every 12 hours  vancomycin  IVPB        MEDICATIONS  (PRN):  bisacodyl Suppository 10 milliGRAM(s) Rectal daily PRN Constipation  HYDROmorphone  Injectable 0.5 milliGRAM(s) IV Push every 2 hours PRN breakthrough pain  naloxone Injectable 0.1 milliGRAM(s) IV Push every 3 minutes PRN For ANY of the following changes in patient status:  A. RR LESS THAN 10 breaths per minute, B. Oxygen saturation LESS THAN 90%, C. Sedation score of 6  ondansetron Injectable 4 milliGRAM(s) IV Push every 6 hours PRN Nausea      Vital Signs Last 24 Hrs  T(C): 36.7 (04 Mar 2018 08:00), Max: 38.8 (03 Mar 2018 23:43)  T(F): 98 (04 Mar 2018 08:00), Max: 101.9 (03 Mar 2018 23:43)  HR: 86 (04 Mar 2018 09:00) (86 - 110)  BP: 125/65 (04 Mar 2018 09:00) (116/61 - 166/85)  BP(mean): 90 (04 Mar 2018 09:00) (83 - 115)  RR: 24 (04 Mar 2018 09:00) (16 - 34)  SpO2: 100% (04 Mar 2018 09:00) (90% - 100%)    PHYSICAL EXAM:    Constitutional: discomfort from pain, WNWD  Eyes: PERRLA, EOM intact   Head: NCAT  Neck: trachea midline, FROM  Respiratory: CTA b/l, no WRR  Cardiovascular: S1S2, RRR  Gastrointestinal: abd. diffusely TTP firm but not rigid on examination.  ND, NGT in place  Extremities: no LE edema b/l  Neurological: AOx3, sensation grossly intact  Skin: warm, dry, intact     I&O's Detail    03 Mar 2018 07:01  -  04 Mar 2018 07:00  --------------------------------------------------------  IN:    lactated ringers.: 1800 mL    Oral Fluid: 240 mL    Solution: 175 mL    Solution: 50 mL    Solution: 100 mL    Solution: 250 mL    Vital HN: 715 mL  Total IN: 3330 mL    OUT:    Bulb: 66 mL    Nasoenteral Tube: 400 mL    Voided: 1475 mL  Total OUT: 1941 mL    Total NET: 1389 mL      04 Mar 2018 07:01  -  04 Mar 2018 10:57  --------------------------------------------------------  IN:    lactated ringers.: 150 mL    Solution: 25 mL  Total IN: 175 mL    OUT:    Bulb: 10 mL    Voided: 250 mL  Total OUT: 260 mL    Total NET: -85 mL    LABS:                        10.1   3.9   )-----------( 227      ( 04 Mar 2018 05:48 )             30.5     03-04    133<L>  |  95<L>  |  12.0  ----------------------------<  102  3.9   |  27.0  |  0.58    Ca    8.6      04 Mar 2018 05:48  Phos  3.1     03-03  Mg     1.8     03-04    TPro  6.3<L>  /  Alb  2.7<L>  /  TBili  0.6  /  DBili  0.2  /  AST  18  /  ALT  18  /  AlkPhos  61  03-04    PT/INR - ( 03 Mar 2018 03:48 )   PT: 13.2 sec;   INR: 1.20 ratio         PTT - ( 03 Mar 2018 03:48 )  PTT:29.8 sec      RADIOLOGY & ADDITIONAL STUDIES:  CT CHEST: Thickening of the gastric pull through is again noted and may be   postsurgical. On the current exam, enteric contrast was given through the   nasogastric tube at the time of the examination. There is a small pocket of   extraluminal enteric contrast within the mediastinum in the subcarinal space   with surrounding fluid and air concerning for a leak from the surgical   anastomosis. Right-sided pleural drain is in place. INTERVAL HPI/OVERNIGHT EVENTS:  Pt seen and examined at the bedside with Dr. Olson.  CT scan of the chest/abd/pelvis was done last night for an increasing in abdominal tenderness which showed findings consistent with a leak at the surgical anastamosis.  pt had spiked a temp of 101.9 rectally ON but has returned to normal since then.  Pt was pan cultured and started on abx.  400cc of dark green output over the past 24 hours.  Pt is still complaining of some abdominal pain this morning but states to me that it has been the same for the past couple of days.  Pt was on RA when examined and satting around 90, 2L NC was placed on and the pt remained in the low 90s but with deep breathing exercises during examination increased to .      STATUS POST:  Lymph node biopsy  02/26/2018  one frozen section bx. and 4 permanent biopsies EGD  02/26/2018     Robot-assisted esophagectomy using da Bit[employer] Si system  02/26/2018     Jejunostomy feeding tube placement  02/26/2018  robotic assisted    POST OPERATIVE DAY #: 6    SUBJECTIVE:  Flatus: [x ] YES [ ] NO             Bowel Movement: [x ] YES [ ] NO  Pain (0-10):            Pain Control Adequate: [ ] YES [x ] NO  Nausea: [ ] YES [ x] NO            Vomiting: [ ] YES [x ] NO  Diarrhea: [ ] YES [x ] NO         Constipation: [ ] YES [x ] NO     Chest Pain: [ ] YES [x ] NO    SOB:  [ ] YES [x ] NO    MEDICATIONS  (STANDING):  enoxaparin Injectable 40 milliGRAM(s) SubCutaneous daily  HYDROmorphone PCA (1 mG/mL) 30 milliLiter(s) PCA Continuous PCA Continuous  lactated ringers. 1000 milliLiter(s) (75 mL/Hr) IV Continuous <Continuous>  piperacillin/tazobactam IVPB. 3.375 Gram(s) IV Intermittent every 8 hours  vancomycin  IVPB 1000 milliGRAM(s) IV Intermittent every 12 hours  vancomycin  IVPB        MEDICATIONS  (PRN):  bisacodyl Suppository 10 milliGRAM(s) Rectal daily PRN Constipation  HYDROmorphone  Injectable 0.5 milliGRAM(s) IV Push every 2 hours PRN breakthrough pain  naloxone Injectable 0.1 milliGRAM(s) IV Push every 3 minutes PRN For ANY of the following changes in patient status:  A. RR LESS THAN 10 breaths per minute, B. Oxygen saturation LESS THAN 90%, C. Sedation score of 6  ondansetron Injectable 4 milliGRAM(s) IV Push every 6 hours PRN Nausea      Vital Signs Last 24 Hrs  T(C): 36.7 (04 Mar 2018 08:00), Max: 38.8 (03 Mar 2018 23:43)  T(F): 98 (04 Mar 2018 08:00), Max: 101.9 (03 Mar 2018 23:43)  HR: 86 (04 Mar 2018 09:00) (86 - 110)  BP: 125/65 (04 Mar 2018 09:00) (116/61 - 166/85)  BP(mean): 90 (04 Mar 2018 09:00) (83 - 115)  RR: 24 (04 Mar 2018 09:00) (16 - 34)  SpO2: 100% (04 Mar 2018 09:00) (90% - 100%)    PHYSICAL EXAM:    Constitutional: discomfort from pain, WNWD  Eyes: PERRLA, EOM intact   Head: NCAT  Neck: trachea midline, FROM  Respiratory: CTA b/l, no WRR  Cardiovascular: S1S2, RRR  Gastrointestinal: abd. diffusely TTP firm but not rigid on examination.  ND, NGT in place  Extremities: no LE edema b/l  Neurological: AOx3, sensation grossly intact  Skin: warm, dry, intact     I&O's Detail    03 Mar 2018 07:01  -  04 Mar 2018 07:00  --------------------------------------------------------  IN:    lactated ringers.: 1800 mL    Oral Fluid: 240 mL    Solution: 175 mL    Solution: 50 mL    Solution: 100 mL    Solution: 250 mL    Vital HN: 715 mL  Total IN: 3330 mL    OUT:    Bulb: 66 mL    Nasoenteral Tube: 400 mL    Voided: 1475 mL  Total OUT: 1941 mL    Total NET: 1389 mL      04 Mar 2018 07:01  -  04 Mar 2018 10:57  --------------------------------------------------------  IN:    lactated ringers.: 150 mL    Solution: 25 mL  Total IN: 175 mL    OUT:    Bulb: 10 mL    Voided: 250 mL  Total OUT: 260 mL    Total NET: -85 mL    LABS:                        10.1   3.9   )-----------( 227      ( 04 Mar 2018 05:48 )             30.5     03-04    133<L>  |  95<L>  |  12.0  ----------------------------<  102  3.9   |  27.0  |  0.58    Ca    8.6      04 Mar 2018 05:48  Phos  3.1     03-03  Mg     1.8     03-04    TPro  6.3<L>  /  Alb  2.7<L>  /  TBili  0.6  /  DBili  0.2  /  AST  18  /  ALT  18  /  AlkPhos  61  03-04    PT/INR - ( 03 Mar 2018 03:48 )   PT: 13.2 sec;   INR: 1.20 ratio         PTT - ( 03 Mar 2018 03:48 )  PTT:29.8 sec      RADIOLOGY & ADDITIONAL STUDIES:  CT CHEST: Thickening of the gastric pull through is again noted and may be   postsurgical. On the current exam, enteric contrast was given through the   nasogastric tube at the time of the examination. There is a small pocket of   extraluminal enteric contrast within the mediastinum in the subcarinal space   with surrounding fluid and air concerning for a leak from the surgical   anastomosis. Right-sided pleural drain is in place.

## 2018-03-05 DIAGNOSIS — R65.10 SYSTEMIC INFLAMMATORY RESPONSE SYNDROME (SIRS) OF NON-INFECTIOUS ORIGIN WITHOUT ACUTE ORGAN DYSFUNCTION: ICD-10-CM

## 2018-03-05 DIAGNOSIS — J15.9 UNSPECIFIED BACTERIAL PNEUMONIA: ICD-10-CM

## 2018-03-05 DIAGNOSIS — D72.819 DECREASED WHITE BLOOD CELL COUNT, UNSPECIFIED: ICD-10-CM

## 2018-03-05 LAB
ALBUMIN SERPL ELPH-MCNC: 2.5 G/DL — LOW (ref 3.3–5.2)
ALP SERPL-CCNC: 61 U/L — SIGNIFICANT CHANGE UP (ref 40–120)
ALT FLD-CCNC: 18 U/L — SIGNIFICANT CHANGE UP
ANION GAP SERPL CALC-SCNC: 14 MMOL/L — SIGNIFICANT CHANGE UP (ref 5–17)
ANISOCYTOSIS BLD QL: SLIGHT — SIGNIFICANT CHANGE UP
AST SERPL-CCNC: 18 U/L — SIGNIFICANT CHANGE UP
BASOPHILS # BLD AUTO: 0 K/UL — SIGNIFICANT CHANGE UP (ref 0–0.2)
BILIRUB SERPL-MCNC: 0.8 MG/DL — SIGNIFICANT CHANGE UP (ref 0.4–2)
BUN SERPL-MCNC: 16 MG/DL — SIGNIFICANT CHANGE UP (ref 8–20)
CALCIUM SERPL-MCNC: 8.4 MG/DL — LOW (ref 8.6–10.2)
CHLORIDE SERPL-SCNC: 96 MMOL/L — LOW (ref 98–107)
CO2 SERPL-SCNC: 27 MMOL/L — SIGNIFICANT CHANGE UP (ref 22–29)
CREAT SERPL-MCNC: 0.66 MG/DL — SIGNIFICANT CHANGE UP (ref 0.5–1.3)
CULTURE RESULTS: NO GROWTH — SIGNIFICANT CHANGE UP
EOSINOPHIL # BLD AUTO: 0.1 K/UL — SIGNIFICANT CHANGE UP (ref 0–0.5)
EOSINOPHIL NFR BLD AUTO: 2 % — SIGNIFICANT CHANGE UP (ref 0–6)
GLUCOSE SERPL-MCNC: 82 MG/DL — SIGNIFICANT CHANGE UP (ref 70–115)
HCT VFR BLD CALC: 31.5 % — LOW (ref 42–52)
HGB BLD-MCNC: 10.7 G/DL — LOW (ref 14–18)
HYPOCHROMIA BLD QL: SLIGHT — SIGNIFICANT CHANGE UP
LYMPHOCYTES # BLD AUTO: 0.1 K/UL — LOW (ref 1–4.8)
LYMPHOCYTES # BLD AUTO: 2 % — LOW (ref 20–55)
MACROCYTES BLD QL: SLIGHT — SIGNIFICANT CHANGE UP
MAGNESIUM SERPL-MCNC: 1.7 MG/DL — SIGNIFICANT CHANGE UP (ref 1.6–2.6)
MCHC RBC-ENTMCNC: 29.5 PG — SIGNIFICANT CHANGE UP (ref 27–31)
MCHC RBC-ENTMCNC: 34 G/DL — SIGNIFICANT CHANGE UP (ref 32–36)
MCV RBC AUTO: 86.8 FL — SIGNIFICANT CHANGE UP (ref 80–94)
MICROCYTES BLD QL: SLIGHT — SIGNIFICANT CHANGE UP
MONOCYTES # BLD AUTO: 0.1 K/UL — SIGNIFICANT CHANGE UP (ref 0–0.8)
MONOCYTES NFR BLD AUTO: 3 % — SIGNIFICANT CHANGE UP (ref 3–10)
NEUTROPHILS # BLD AUTO: 5 K/UL — SIGNIFICANT CHANGE UP (ref 1.8–8)
NEUTROPHILS NFR BLD AUTO: 87 % — HIGH (ref 37–73)
NEUTS BAND # BLD: 6 % — SIGNIFICANT CHANGE UP (ref 0–8)
PHOSPHATE SERPL-MCNC: 4.2 MG/DL — SIGNIFICANT CHANGE UP (ref 2.4–4.7)
PLAT MORPH BLD: NORMAL — SIGNIFICANT CHANGE UP
PLATELET # BLD AUTO: 259 K/UL — SIGNIFICANT CHANGE UP (ref 150–400)
POIKILOCYTOSIS BLD QL AUTO: SLIGHT — SIGNIFICANT CHANGE UP
POTASSIUM SERPL-MCNC: 4.1 MMOL/L — SIGNIFICANT CHANGE UP (ref 3.5–5.3)
POTASSIUM SERPL-SCNC: 4.1 MMOL/L — SIGNIFICANT CHANGE UP (ref 3.5–5.3)
PROT SERPL-MCNC: 6 G/DL — LOW (ref 6.6–8.7)
RBC # BLD: 3.63 M/UL — LOW (ref 4.6–6.2)
RBC # FLD: 14.1 % — SIGNIFICANT CHANGE UP (ref 11–15.6)
RBC BLD AUTO: ABNORMAL
SODIUM SERPL-SCNC: 137 MMOL/L — SIGNIFICANT CHANGE UP (ref 135–145)
SPECIMEN SOURCE: SIGNIFICANT CHANGE UP
WBC # BLD: 5.4 K/UL — SIGNIFICANT CHANGE UP (ref 4.8–10.8)
WBC # FLD AUTO: 5.4 K/UL — SIGNIFICANT CHANGE UP (ref 4.8–10.8)

## 2018-03-05 PROCEDURE — 71045 X-RAY EXAM CHEST 1 VIEW: CPT | Mod: 26

## 2018-03-05 PROCEDURE — 99222 1ST HOSP IP/OBS MODERATE 55: CPT

## 2018-03-05 PROCEDURE — 74018 RADEX ABDOMEN 1 VIEW: CPT | Mod: 26,59

## 2018-03-05 RX ORDER — VANCOMYCIN HCL 1 G
1000 VIAL (EA) INTRAVENOUS EVERY 8 HOURS
Qty: 0 | Refills: 0 | Status: DISCONTINUED | OUTPATIENT
Start: 2018-03-05 | End: 2018-03-05

## 2018-03-05 RX ORDER — MAGNESIUM SULFATE 500 MG/ML
2 VIAL (ML) INJECTION
Qty: 0 | Refills: 0 | Status: COMPLETED | OUTPATIENT
Start: 2018-03-05 | End: 2018-03-05

## 2018-03-05 RX ORDER — HYDROMORPHONE HYDROCHLORIDE 2 MG/ML
0.5 INJECTION INTRAMUSCULAR; INTRAVENOUS; SUBCUTANEOUS
Qty: 0 | Refills: 0 | Status: DISCONTINUED | OUTPATIENT
Start: 2018-03-05 | End: 2018-03-08

## 2018-03-05 RX ADMIN — HYDROMORPHONE HYDROCHLORIDE 30 MILLILITER(S): 2 INJECTION INTRAMUSCULAR; INTRAVENOUS; SUBCUTANEOUS at 07:40

## 2018-03-05 RX ADMIN — PIPERACILLIN AND TAZOBACTAM 25 GRAM(S): 4; .5 INJECTION, POWDER, LYOPHILIZED, FOR SOLUTION INTRAVENOUS at 14:09

## 2018-03-05 RX ADMIN — Medication 50 GRAM(S): at 07:44

## 2018-03-05 RX ADMIN — Medication 250 MILLIGRAM(S): at 06:25

## 2018-03-05 RX ADMIN — PANTOPRAZOLE SODIUM 40 MILLIGRAM(S): 20 TABLET, DELAYED RELEASE ORAL at 12:16

## 2018-03-05 RX ADMIN — Medication 10 MILLIGRAM(S): at 10:26

## 2018-03-05 RX ADMIN — PIPERACILLIN AND TAZOBACTAM 25 GRAM(S): 4; .5 INJECTION, POWDER, LYOPHILIZED, FOR SOLUTION INTRAVENOUS at 06:25

## 2018-03-05 RX ADMIN — Medication 50 GRAM(S): at 06:25

## 2018-03-05 RX ADMIN — HYDROMORPHONE HYDROCHLORIDE 30 MILLILITER(S): 2 INJECTION INTRAMUSCULAR; INTRAVENOUS; SUBCUTANEOUS at 19:15

## 2018-03-05 RX ADMIN — PIPERACILLIN AND TAZOBACTAM 25 GRAM(S): 4; .5 INJECTION, POWDER, LYOPHILIZED, FOR SOLUTION INTRAVENOUS at 22:38

## 2018-03-05 RX ADMIN — ENOXAPARIN SODIUM 40 MILLIGRAM(S): 100 INJECTION SUBCUTANEOUS at 12:16

## 2018-03-05 RX ADMIN — Medication 250 MILLIGRAM(S): at 14:10

## 2018-03-05 NOTE — PROGRESS NOTE ADULT - SUBJECTIVE AND OBJECTIVE BOX
Subjective: Pt resting in bed comfortably no acute events overnight     T(C): 36.8 (03-05-18 @ 03:00), Max: 37.2 (03-04-18 @ 04:00)  HR: 108 (03-05-18 @ 03:00) (78 - 108)  BP: 161/78 (03-05-18 @ 03:00) (116/61 - 161/78)  ABP: --  ABP(mean): --  RR: 33 (03-05-18 @ 03:00) (14 - 33)  SpO2: 100% (03-05-18 @ 03:00) (95% - 100%)  Wt(kg): --  CVP(mm Hg): --  CO: --  CI: --  PA: --      03-04    133<L>  |  95<L>  |  12.0  ----------------------------<  102  3.9   |  27.0  |  0.58    Ca    8.6      04 Mar 2018 05:48  Phos  3.1     03-03  Mg     1.8     03-04    TPro  6.3<L>  /  Alb  2.7<L>  /  TBili  0.6  /  DBili  0.2  /  AST  18  /  ALT  18  /  AlkPhos  61  03-04                               10.1   3.9   )-----------( 227      ( 04 Mar 2018 05:48 )             30.5        PT/INR - ( 03 Mar 2018 03:48 )   PT: 13.2 sec;   INR: 1.20 ratio         PTT - ( 03 Mar 2018 03:48 )  PTT:29.8 sec  ABG - ( 03 Mar 2018 22:35 )  pH: 7.49  /  pCO2: 39    /  pO2: 54    / HCO3: 29    / Base Excess: 5.5   /  SaO2: 90                                   CAPILLARY BLOOD GLUCOSE           CXR: Pending imaging and review by radiology.     I&O's Detail    03 Mar 2018 07:01  -  04 Mar 2018 07:00  --------------------------------------------------------  IN:    lactated ringers.: 1800 mL    Oral Fluid: 240 mL    Solution: 175 mL    Solution: 50 mL    Solution: 100 mL    Solution: 250 mL    Vital HN: 715 mL  Total IN: 3330 mL    OUT:    Bulb: 66 mL    Nasoenteral Tube: 500 mL    Voided: 1475 mL  Total OUT: 2041 mL    Total NET: 1289 mL      04 Mar 2018 07:01  -  05 Mar 2018 03:19  --------------------------------------------------------  IN:    Enteral Tube Flush: 60 mL    lactated ringers.: 1425 mL    Solution: 250 mL    Solution: 225 mL  Total IN: 1960 mL    OUT:    Bulb: 60 mL    Nasoenteral Tube: 400 mL    Voided: 1025 mL  Total OUT: 1485 mL    Total NET: 475 mL        Drug Dosing Weight  Height (cm): 180.34 (26 Feb 2018 05:46)  Weight (kg): 54 (26 Feb 2018 05:46)  BMI (kg/m2): 16.6 (26 Feb 2018 05:46)  BSA (m2): 1.69 (26 Feb 2018 05:46)    MEDICATIONS  (STANDING):  enoxaparin Injectable 40 milliGRAM(s) SubCutaneous daily  HYDROmorphone PCA (1 mG/mL) 30 milliLiter(s) PCA Continuous PCA Continuous  lactated ringers. 1000 milliLiter(s) (75 mL/Hr) IV Continuous <Continuous>  pantoprazole  Injectable 40 milliGRAM(s) IV Push daily  piperacillin/tazobactam IVPB. 3.375 Gram(s) IV Intermittent every 8 hours  vancomycin  IVPB 1000 milliGRAM(s) IV Intermittent every 12 hours  vancomycin  IVPB        MEDICATIONS  (PRN):  bisacodyl Suppository 10 milliGRAM(s) Rectal daily PRN Constipation  HYDROmorphone  Injectable 0.5 milliGRAM(s) IV Push every 2 hours PRN breakthrough pain  naloxone Injectable 0.1 milliGRAM(s) IV Push every 3 minutes PRN For ANY of the following changes in patient status:  A. RR LESS THAN 10 breaths per minute, B. Oxygen saturation LESS THAN 90%, C. Sedation score of 6  ondansetron Injectable 4 milliGRAM(s) IV Push every 6 hours PRN Nausea      Physical Exam  Neuro: AAOx3 in NAD  Pulm: CTA b/l, positive breath sounds b/l   CV: RRR, positive S1/S2  Abd: slight distension, pt reports tenderness to light palpation, diminished bowel   Extremities: DP 2+, no pitting edema   Incision(s): sternal wound c/d/i

## 2018-03-05 NOTE — CONSULT NOTE ADULT - PROBLEM SELECTOR RECOMMENDATION 9
Had fever to 101.2F along with tachycardia and tachypnea.   No more fevers, tachycardia or tachypnea  Follow up blood cultures  Sputum cx with GNR, follow up culture  CT chest with ? pneumonia  Continue Zosyn  D/C Vancomycin  Trend fevers  Trend WBC

## 2018-03-05 NOTE — PROGRESS NOTE ADULT - PROBLEM SELECTOR PLAN 4
continue to monitor for any sepsis  trend WBC, monitor for feveres. Presently without fevers during shift.  gram - rods in sputum , continue vanc zosyn and deescalate once cultures speciate. continue to monitor for any sepsis  trend WBC, monitor for fevers. Presently without fevers during shift.  gram - rods in sputum , continue vanc zosyn and deescalate once cultures speciate.

## 2018-03-05 NOTE — PROGRESS NOTE ADULT - ASSESSMENT
57 year old male with esophageal cancer, Hodgkin's lymphoma, GERD. 2/26 s/p robotic Witter Mehran Esophagogastrectomy and jejunostomy tube placement.     J tube placement confirmed on gastrograffin study 2/27/2018.   2/28 Right pleural chest tube dressing saturated, removed at bedside, likely leaking around tube site without obvious drainage when evaluated.   3/1 CXR ? change in right pleural space, CT chest done without significant findings as per Dr. Blanco, NGT secured to suction, CXR improved, patient feeling better   3/3 - worsening abdominal pain, CT chest suspicious for leak, some free IP air as expected after abdominal surgery, less than on CT performed 3/1, CT Abd- no acute abdominal findings on preliminary report.    -Pan Culture  -Vanco/Zosyn as per Dr. Blanco  -Hold J-tube feeds   -cont NGT to suction  -Pain control - IV/Subcutaneous dilaudid PRN  -Gen Surg f/u appreciated (SANAZ Aviles spoke with Dr. Olson)  Discussed events and plan with Dr. Blanco who agrees with course of action.    3/4 Gram negative rods in sputum febrile to 101.9 on vanc zosyn

## 2018-03-05 NOTE — PROGRESS NOTE ADULT - SUBJECTIVE AND OBJECTIVE BOX
Chief Complaint:    Patient seen and examined at bedside. Patient seen and examined. Resting comfortably in chair in NAD. Reports he does have increased pain. Pain is primarily in the abdomin. Described as sharp and stabbing. He has been moving around a lot this morning out of bed and getting washed. Also had enema this am and waiting for BM. States pain has progressively improved over the past few days.   Reports relief with use of PCA but he is not using it frequently. Patient inquired about increasing to get more relief. Spoke with nurse and was advised pt is only using it on average 1x/hour. Patient was educated on use of PCA. No adverse drug reactions.   Pain score currently 7/10  Strict NPO  NGT  +constipation, abdominal pain  No nausea/vomiting/diarrhea/dizziness/bowel or bladder incontinence/headache/CP/SOB    PAST MEDICAL & SURGICAL HISTORY:  Esophageal cancer  Weight loss, unintentional  Hodgkins lymphoma: 25 years ago  Acid reflux disease  No significant past surgical history      SOCIAL HISTORY:  [ ] Denies Smoking, Alcohol, or Drug Use    Allergies    No Known Allergies    Intolerances        PAIN MEDICATIONS:  HYDROmorphone  Injectable 0.5 milliGRAM(s) IV Push every 3 hours PRN  HYDROmorphone PCA (1 mG/mL) 30 milliLiter(s) PCA Continuous PCA Continuous  ondansetron Injectable 4 milliGRAM(s) IV Push every 6 hours PRN    Heme:  enoxaparin Injectable 40 milliGRAM(s) SubCutaneous daily    Antibiotics:  piperacillin/tazobactam IVPB. 3.375 Gram(s) IV Intermittent every 8 hours  vancomycin  IVPB 1000 milliGRAM(s) IV Intermittent every 8 hours    Cardiac:    Pulmonary:    Endocrine    GI:  pantoprazole  Injectable 40 milliGRAM(s) IV Push daily    All Other Meds:  lactated ringers. 1000 milliLiter(s) IV Continuous <Continuous>  naloxone Injectable 0.1 milliGRAM(s) IV Push every 3 minutes PRN      LABS:                          10.7   5.4   )-----------( 259      ( 05 Mar 2018 04:13 )             31.5     03-05    137  |  96<L>  |  16.0  ----------------------------<  82  4.1   |  27.0  |  0.66    Ca    8.4<L>      05 Mar 2018 04:13  Phos  4.2     03-05  Mg     1.7     03-05    TPro  6.0<L>  /  Alb  2.5<L>  /  TBili  0.8  /  DBili  x   /  AST  18  /  ALT  18  /  AlkPhos  61  03-05          Drug Screen:        RADIOLOGY:    REVIEW OF SYSTEMS:  CONSTITUTIONAL: Neg  NECK: No pain or stiffness  RESPIRATORY: No cough, wheezing, chills or hemoptysis; No shortness of breath  CARDIOVASCULAR: No chest pain, palpitations, dizziness, or leg swelling  GASTROINTESTINAL: +abdominal pain secondary to constipation  GENITOURINARY: No dysuria, frequency, hematuria, or incontinence  NEUROLOGICAL: No headaches, memory loss, loss of strength, numbness, or tremors  SKIN: No itching, burning, rashes, or lesions   LYMPH NODES: No enlarged glands  MUSCULOSKELETAL: No joint pain or swelling; No muscle, back, or extremity pain  PSYCHIATRIC: No depression, anxiety, mood swings, or difficulty sleeping  HEME/LYMPH: No easy bruising, or bleeding gums    Vital Signs Last 24 Hrs  T(C): 36.9 (05 Mar 2018 08:00), Max: 36.9 (04 Mar 2018 20:00)  T(F): 98.5 (05 Mar 2018 08:00), Max: 98.5 (05 Mar 2018 08:00)  HR: 90 (05 Mar 2018 11:00) (78 - 113)  BP: 110/57 (05 Mar 2018 11:00) (110/57 - 161/78)  BP(mean): 75 (05 Mar 2018 11:00) (75 - 112)  RR: 20 (05 Mar 2018 11:00) (14 - 33)  SpO2: 100% (05 Mar 2018 11:00) (95% - 100%)    PAIN SCORE:   7/10     SCALE USED: (1-10)      PHYSICAL EXAM:    GENERAL: NAD, well-groomed, well-developed  HEAD:  Atraumatic, Normocephalic  ENMT: NGT in place  NECK: Supple, No JVD, Normal thyroid  NERVOUS SYSTEM:  Alert & Oriented X3, Good concentration; Motor Strength 5/5 B/L upper and lower extremities; DTRs 2+ intact and symmetric  CHEST/LUNG: nonlabored breathing  HEART: Regular rate and rhythm;  ABDOMEN: mild distension, nontender to palpation  EXTREMITIES:  2+ Peripheral Pulses, No clubbing, cyanosis, or edema  LYMPH: No lymphadenopathy noted  SKIN: No rashes or lesions Chief Complaint:    Patient seen and examined at bedside.  Resting comfortably in chair in NAD. Reports he does have increased pain. Pain is primarily in the abdomen. Described as sharp and stabbing. He has been moving around a lot this morning out of bed and getting washed. Also had enema this am and waiting for BM. States pain has progressively improved over the past few days.   Reports relief with use of PCA but he is not using it frequently. Patient inquired about increasing to get more relief. Spoke with nurse and was advised pt is only using it on average 1x/hour. Patient was educated on use of PCA. No adverse drug reactions.   Pain score currently 7/10  Strict NPO  NGT  +constipation, abdominal pain  No nausea/vomiting/diarrhea/dizziness/bowel or bladder incontinence/headache/CP/SOB    PAST MEDICAL & SURGICAL HISTORY:  Esophageal cancer  Weight loss, unintentional  Hodgkins lymphoma: 25 years ago  Acid reflux disease  No significant past surgical history      SOCIAL HISTORY:  [ ] Denies Smoking, Alcohol, or Drug Use    Allergies    No Known Allergies    Intolerances        PAIN MEDICATIONS:  HYDROmorphone  Injectable 0.5 milliGRAM(s) IV Push every 3 hours PRN  HYDROmorphone PCA (1 mG/mL) 30 milliLiter(s) PCA Continuous PCA Continuous  ondansetron Injectable 4 milliGRAM(s) IV Push every 6 hours PRN    Heme:  enoxaparin Injectable 40 milliGRAM(s) SubCutaneous daily    Antibiotics:  piperacillin/tazobactam IVPB. 3.375 Gram(s) IV Intermittent every 8 hours  vancomycin  IVPB 1000 milliGRAM(s) IV Intermittent every 8 hours    Cardiac:    Pulmonary:    Endocrine    GI:  pantoprazole  Injectable 40 milliGRAM(s) IV Push daily    All Other Meds:  lactated ringers. 1000 milliLiter(s) IV Continuous <Continuous>  naloxone Injectable 0.1 milliGRAM(s) IV Push every 3 minutes PRN      LABS:                          10.7   5.4   )-----------( 259      ( 05 Mar 2018 04:13 )             31.5     03-05    137  |  96<L>  |  16.0  ----------------------------<  82  4.1   |  27.0  |  0.66    Ca    8.4<L>      05 Mar 2018 04:13  Phos  4.2     03-05  Mg     1.7     03-05    TPro  6.0<L>  /  Alb  2.5<L>  /  TBili  0.8  /  DBili  x   /  AST  18  /  ALT  18  /  AlkPhos  61  03-05          Drug Screen:        RADIOLOGY:    REVIEW OF SYSTEMS:  CONSTITUTIONAL: Neg  NECK: No pain or stiffness  RESPIRATORY: No cough, wheezing, chills or hemoptysis; No shortness of breath  CARDIOVASCULAR: No chest pain, palpitations, dizziness, or leg swelling  GASTROINTESTINAL: +abdominal pain secondary to constipation  GENITOURINARY: No dysuria, frequency, hematuria, or incontinence  NEUROLOGICAL: No headaches, memory loss, loss of strength, numbness, or tremors  SKIN: No itching, burning, rashes, or lesions   LYMPH NODES: No enlarged glands  MUSCULOSKELETAL: No joint pain or swelling; No muscle, back, or extremity pain  PSYCHIATRIC: No depression, anxiety, mood swings, or difficulty sleeping  HEME/LYMPH: No easy bruising, or bleeding gums    Vital Signs Last 24 Hrs  T(C): 36.9 (05 Mar 2018 08:00), Max: 36.9 (04 Mar 2018 20:00)  T(F): 98.5 (05 Mar 2018 08:00), Max: 98.5 (05 Mar 2018 08:00)  HR: 90 (05 Mar 2018 11:00) (78 - 113)  BP: 110/57 (05 Mar 2018 11:00) (110/57 - 161/78)  BP(mean): 75 (05 Mar 2018 11:00) (75 - 112)  RR: 20 (05 Mar 2018 11:00) (14 - 33)  SpO2: 100% (05 Mar 2018 11:00) (95% - 100%)    PAIN SCORE:   7/10     SCALE USED: (1-10)      PHYSICAL EXAM:    GENERAL: NAD, well-groomed, well-developed  HEAD:  Atraumatic, Normocephalic  ENMT: NGT in place  NECK: Supple, No JVD, Normal thyroid  NERVOUS SYSTEM:  Alert & Oriented X3, Good concentration; Motor Strength 5/5 B/L upper and lower extremities; DTRs 2+ intact and symmetric  CHEST/LUNG: nonlabored breathing  HEART: Regular rate and rhythm;  ABDOMEN: mild distension, nontender to palpation  EXTREMITIES:  2+ Peripheral Pulses, No clubbing, cyanosis, or edema  LYMPH: No lymphadenopathy noted  SKIN: No rashes or lesions

## 2018-03-05 NOTE — CONSULT NOTE ADULT - ASSESSMENT
58 y/o M with esophageal Ca s/p robotic Penryn Mehran Esophagogastrectomy and jejunostomy tube placement 2/6/18, developed fever post op 3/4/18

## 2018-03-05 NOTE — PROGRESS NOTE ADULT - PROBLEM SELECTOR PLAN 1
Patient is stable in terms of pain. Reports increased pain with movement. Has not been using the PCA.   Patient was educated on use of PCA. I counseled patient on regular use when he has pain. Will continue on current settings for now.   -Dilaudid PCA 0.2mg demand dose [id number]; max 4mg per four hours  -Dilaudid 0.5mg IVP [id number] for severe BTP  Continue to monitor patient's respiratory status. Hold PCA for increased sedation, decreased respiratory rate.     Will continue to follow patient.  Call with any pain questions .

## 2018-03-05 NOTE — PROGRESS NOTE ADULT - PROBLEM SELECTOR PLAN 1
admitted to [facility] 2/26  NGT to LIWS  Do not manipulate, d/c or use NGT for feeds/meds  strict NPO   above including j-tube feeds as d/w Dr Blanco  HOB >45 degrees for aspiration precaution.  serial abdominal exams admitted to [facility]ICU 2/26  NGT to LIWS  Do not manipulate, d/c or use NGT for feeds/meds  strict NPO   above including j-tube feeds as d/w Dr Blanco  HOB >45 degrees for aspiration precaution.  serial abdominal exams  Dilaudid PCA restarted

## 2018-03-05 NOTE — CONSULT NOTE ADULT - SUBJECTIVE AND OBJECTIVE BOX
[facility]  INFECTIOUS DISEASES AND INTERNAL MEDICINE at [place]  =======================================================  Jere Spear MD  Diplomates American Board of Internal Medicine and Infectious Diseases  =======================================================      [id number]  [facility]    CC: Patient is a 57y old  Male who presents with a chief complaint of Esophageal Cancer (12 Feb 2018 10:28)    56y/o  Male with h/o  esophageal cancer diagnosed around October and has been on chemo-radiation since, last treatment in January 2018. Patient came in for an elective esophagogastroduodenoscopy (EGD) Robotic Hollister Mehran esophagogastrectomy 2/26/18. Patient had fever on 3/4/18, started on Vancomycin Zosyn. CT A/P with no infectious findings. Cultures pending. ID input requested.       Past Medical & Surgical Hx:  Esophageal cancer  Weight loss, unintentional  Hodgkins lymphoma: 25 years ago  Acid reflux disease  No significant past surgical history      Social Hx:  Active Smoker, Prior Marijuana use, occasional ETOH       FAMILY HISTORY:  Family history of prostate cancer in father (Father)      Allergies  No Known Allergies      Antibiotics:  piperacillin/tazobactam IVPB. 3.375 Gram(s) IV Intermittent every 8 hours  vancomycin  IVPB 1000 milliGRAM(s) IV Intermittent every 8 hours       REVIEW OF SYSTEMS:  CONSTITUTIONAL:  No Fever or chills  HEENT:  No diplopia or blurred vision.  No earache, sore throat or runny nose.  CARDIOVASCULAR:  No pressure, squeezing, strangling, tightness, heaviness or aching about the chest, neck, axilla or epigastrium. + Pain at surgical site  RESPIRATORY:  No cough, shortness of breath  GASTROINTESTINAL:  No nausea, vomiting or diarrhea.  GENITOURINARY:  No dysuria, frequency  MUSCULOSKELETAL:  no joint aches, no muscle pain  SKIN:  No change in skin, hair or nails.  NEUROLOGIC:  No paresthesias, fasciculations  PSYCHIATRIC:  No disorder of thought or mood.  ENDOCRINE:  No heat or cold intolerance  HEMATOLOGICAL:  No easy bruising or bleeding.       Physical Exam:  Vital Signs Last 24 Hrs  T(C): 36.9 (05 Mar 2018 08:00), Max: 36.9 (04 Mar 2018 20:00)  T(F): 98.5 (05 Mar 2018 08:00), Max: 98.5 (05 Mar 2018 08:00)  HR: 90 (05 Mar 2018 11:00) (78 - 113)  BP: 110/57 (05 Mar 2018 11:00) (110/57 - 161/78)  BP(mean): 75 (05 Mar 2018 11:00) (75 - 112)  RR: 20 (05 Mar 2018 11:00) (14 - 33)  SpO2: 100% (05 Mar 2018 11:00) (95% - 100%)      GEN: NAD, pleasant  HEENT: normocephalic and atraumatic. EOMI. PERRL.  +NGT  NECK: Supple.  LUNGS: minimal wheezes  HEART: Regular rate and rhythm  ABDOMEN: Soft, nontender, and nondistended.  Positive bowel sounds.    : No CVA tenderness  EXTREMITIES: Without any edema.  MSK: No joint swelling  NEUROLOGIC: no focal deficits   PSYCHIATRIC: Appropriate affect .  SKIN: No rash      Labs:  03-05    137  |  96<L>  |  16.0  ----------------------------<  82  4.1   |  27.0  |  0.66    Ca    8.4<L>      05 Mar 2018 04:13  Phos  4.2     03-05  Mg     1.7     03-05    TPro  6.0<L>  /  Alb  2.5<L>  /  TBili  0.8  /  DBili  x   /  AST  18  /  ALT  18  /  AlkPhos  61  03-05                          10.7   5.4   )-----------( 259      ( 05 Mar 2018 04:13 )             31.5       LIVER FUNCTIONS - ( 05 Mar 2018 04:13 )  Alb: 2.5 g/dL / Pro: 6.0 g/dL / ALK PHOS: 61 U/L / ALT: 18 U/L / AST: 18 U/L / GGT: x             ABG - ( 03 Mar 2018 22:35 )  pH: 7.49  /  pCO2: 39    /  pO2: 54    / HCO3: 29    / Base Excess: 5.5   /  SaO2: 90          RECENT CULTURES:  03-04 @ 05:44 .Urine Clean Catch (Midstream)     No growth      03-04 @ 02:47 .Sputum Sputum     Numerous Gram Negative Rods Identification and susceptibility to follow.  Numerous Routine respiratory marquis present  Culture in progress  Few White blood cells  Numerous Gram Negative Rods  Moderate Gram Positive Cocci in Pairs and Chains        EXAM:  CT ABDOMEN AND PELVIS OC IC                        EXAM:  CT CHEST                        PROCEDURE DATE:  03/03/2018    INTERPRETATION:  Clinical information: Status post esophagectomy.   Abdominal pain and tachycardia.  Comparison: Chest CT dated 3/1/2018  PROCEDURE:   CT of the Chest was performed without intravenous contrast and CT scan   abdomen and pelvis was performed with   intravenous contrast  100 ml of Omnipaque 350 was injected intravenously. None was discarded  Oral contrast was administered via the nasogastric tube.    FINDINGS:  CHEST:  LUNG AND LARGE AIRWAYS: There is emphysema. There is atelectasis at both   lung bases. There is a small left and trace right pleural effusion. Right   apical pleural thickening and calcification is again noted. There has   been interval development of loculated fluid and air at the right lung   base measuring 2.6 x 3.0 cm. Interval development of a patchy opacity in   the left upper lobe which is either infectious or inflammatory.  PLEURA: There is a right chest tube again noted. There is a small right   pneumothorax again noted. Pleural calcification in the left hemithorax is   again noted. A previously identified additional right chest tube has been   removed. There is air and fluid and subcutaneous emphysema in this region   along the previous tract in the right lower chest wall.  VESSELS:    Atherosclerotic changes in the aorta   HEART: normal size. No pericardial effusion.  MEDIASTINUM AND NAS: There has been partial esophagectomy with gastric   pull-through. There is a nasogastric tube with tip in the region of   stomach. There is oral contrast within distended proximal esophagus. At   the site of esophagogastric anastomotic suture there is extravasation of   oral contrast into the mediastinum near the level of the kate. This   area measures 2.2 x 0.8 cm on series 2 image 34. Air and fluid is noted   tracking more inferiorly within the mediastinum. Some of this air fluid   measures 4.5 x 1.4 cm on series 2 image 44.  The gastric wall is   diffusely thickened. A few droplets of air just to the left of the   descending thoracic aorta may be located in the mediastinum or the left   pleural space.  CHEST WALL AND LOWER NECK: Right neck and right upper hemithorax   subcutaneous emphysema is again noted, decreased compared with the prior exam.  ABDOMEN:  LIVER: Two 5 mm hypodensities in the liver.  BILE DUCTS: normal caliber.  GALLBLADDER: No calcified gallstones. Normal caliber wall.  PANCREAS: Pancreatic duct is prominent measuring 3 mm in the body.  SPLEEN: within normal limits.  ADRENALS: within normal limits.  KIDNEYS: within normal limits.  PELVIS:  REPRODUCTIVE ORGANS: no pelvic masses.  URETERS: within normal limits.  BLADDER: within normal limits.  BOWEL: There is no bowel obstruction or bowel wall thickening. There is   residual contrast within small bowel and colon. There is a jejunostomy   tube in the left upper quadrant. No enlarged mesenteric lymph nodes.  PERITONEUM: Decrease in the pneumoperitoneum.   VESSELS: Atherosclerotic changes      RETROPERITONEUM: No enlarged retroperitoneal or pelvic nodes.  ABDOMINAL WALL: There are subcutaneous droplets of air in the ventral abdominal wall.  BONES: within normal limits.  IMPRESSION:   Status post partial esophagectomy and gastric pull-through. Findings   compatible with oral contrast leak at the esophagogastric anastomosis   with air and fluid collection in the mediastinum.  Gastric wall thickening now noted.  Right chest tube and small right pneumothorax again noted.  Interval   development of focal loculated air and fluid at the right lung base.  Interval development of left upper lobe opacity which is either   infectious or inflammatory.  Decrease in pneumoperitoneum.

## 2018-03-06 DIAGNOSIS — K59.00 CONSTIPATION, UNSPECIFIED: ICD-10-CM

## 2018-03-06 DIAGNOSIS — C15.9 MALIGNANT NEOPLASM OF ESOPHAGUS, UNSPECIFIED: ICD-10-CM

## 2018-03-06 DIAGNOSIS — R10.84 GENERALIZED ABDOMINAL PAIN: ICD-10-CM

## 2018-03-06 LAB
-  AMIKACIN: SIGNIFICANT CHANGE UP
-  AMIKACIN: SIGNIFICANT CHANGE UP
-  AMPICILLIN/SULBACTAM: SIGNIFICANT CHANGE UP
-  AMPICILLIN: SIGNIFICANT CHANGE UP
-  AZTREONAM: SIGNIFICANT CHANGE UP
-  AZTREONAM: SIGNIFICANT CHANGE UP
-  CEFAZOLIN: SIGNIFICANT CHANGE UP
-  CEFEPIME: SIGNIFICANT CHANGE UP
-  CEFEPIME: SIGNIFICANT CHANGE UP
-  CEFOXITIN: SIGNIFICANT CHANGE UP
-  CEFTAZIDIME: SIGNIFICANT CHANGE UP
-  CEFTAZIDIME: SIGNIFICANT CHANGE UP
-  CEFTRIAXONE: SIGNIFICANT CHANGE UP
-  CIPROFLOXACIN: SIGNIFICANT CHANGE UP
-  CIPROFLOXACIN: SIGNIFICANT CHANGE UP
-  ERTAPENEM: SIGNIFICANT CHANGE UP
-  GENTAMICIN: SIGNIFICANT CHANGE UP
-  GENTAMICIN: SIGNIFICANT CHANGE UP
-  IMIPENEM: SIGNIFICANT CHANGE UP
-  IMIPENEM: SIGNIFICANT CHANGE UP
-  LEVOFLOXACIN: SIGNIFICANT CHANGE UP
-  LEVOFLOXACIN: SIGNIFICANT CHANGE UP
-  MEROPENEM: SIGNIFICANT CHANGE UP
-  MEROPENEM: SIGNIFICANT CHANGE UP
-  PIPERACILLIN/TAZOBACTAM: SIGNIFICANT CHANGE UP
-  PIPERACILLIN/TAZOBACTAM: SIGNIFICANT CHANGE UP
-  TOBRAMYCIN: SIGNIFICANT CHANGE UP
-  TOBRAMYCIN: SIGNIFICANT CHANGE UP
-  TRIMETHOPRIM/SULFAMETHOXAZOLE: SIGNIFICANT CHANGE UP
ANION GAP SERPL CALC-SCNC: 12 MMOL/L — SIGNIFICANT CHANGE UP (ref 5–17)
BUN SERPL-MCNC: 15 MG/DL — SIGNIFICANT CHANGE UP (ref 8–20)
CALCIUM SERPL-MCNC: 8.1 MG/DL — LOW (ref 8.6–10.2)
CHLORIDE SERPL-SCNC: 94 MMOL/L — LOW (ref 98–107)
CO2 SERPL-SCNC: 27 MMOL/L — SIGNIFICANT CHANGE UP (ref 22–29)
CREAT SERPL-MCNC: 0.73 MG/DL — SIGNIFICANT CHANGE UP (ref 0.5–1.3)
CULTURE RESULTS: SIGNIFICANT CHANGE UP
GLUCOSE SERPL-MCNC: 86 MG/DL — SIGNIFICANT CHANGE UP (ref 70–115)
HCT VFR BLD CALC: 29.7 % — LOW (ref 42–52)
HGB BLD-MCNC: 9.9 G/DL — LOW (ref 14–18)
MAGNESIUM SERPL-MCNC: 2.2 MG/DL — SIGNIFICANT CHANGE UP (ref 1.6–2.6)
MCHC RBC-ENTMCNC: 29 PG — SIGNIFICANT CHANGE UP (ref 27–31)
MCHC RBC-ENTMCNC: 33.3 G/DL — SIGNIFICANT CHANGE UP (ref 32–36)
MCV RBC AUTO: 87.1 FL — SIGNIFICANT CHANGE UP (ref 80–94)
METHOD TYPE: SIGNIFICANT CHANGE UP
METHOD TYPE: SIGNIFICANT CHANGE UP
ORGANISM # SPEC MICROSCOPIC CNT: SIGNIFICANT CHANGE UP
PHOSPHATE SERPL-MCNC: 3.8 MG/DL — SIGNIFICANT CHANGE UP (ref 2.4–4.7)
PLATELET # BLD AUTO: 291 K/UL — SIGNIFICANT CHANGE UP (ref 150–400)
POTASSIUM SERPL-MCNC: 4.1 MMOL/L — SIGNIFICANT CHANGE UP (ref 3.5–5.3)
POTASSIUM SERPL-SCNC: 4.1 MMOL/L — SIGNIFICANT CHANGE UP (ref 3.5–5.3)
RBC # BLD: 3.41 M/UL — LOW (ref 4.6–6.2)
RBC # FLD: 14.3 % — SIGNIFICANT CHANGE UP (ref 11–15.6)
SODIUM SERPL-SCNC: 133 MMOL/L — LOW (ref 135–145)
SPECIMEN SOURCE: SIGNIFICANT CHANGE UP
WBC # BLD: 8.3 K/UL — SIGNIFICANT CHANGE UP (ref 4.8–10.8)
WBC # FLD AUTO: 8.3 K/UL — SIGNIFICANT CHANGE UP (ref 4.8–10.8)

## 2018-03-06 PROCEDURE — 71250 CT THORAX DX C-: CPT | Mod: 26

## 2018-03-06 PROCEDURE — 99232 SBSQ HOSP IP/OBS MODERATE 35: CPT

## 2018-03-06 PROCEDURE — 74230 X-RAY XM SWLNG FUNCJ C+: CPT | Mod: 26

## 2018-03-06 PROCEDURE — 71045 X-RAY EXAM CHEST 1 VIEW: CPT | Mod: 26

## 2018-03-06 RX ORDER — MULTIVIT WITH MIN/MFOLATE/K2 340-15/3 G
100 POWDER (GRAM) ORAL ONCE
Qty: 0 | Refills: 0 | Status: COMPLETED | OUTPATIENT
Start: 2018-03-06 | End: 2018-03-06

## 2018-03-06 RX ORDER — CEFEPIME 1 G/1
2000 INJECTION, POWDER, FOR SOLUTION INTRAMUSCULAR; INTRAVENOUS ONCE
Qty: 0 | Refills: 0 | Status: COMPLETED | OUTPATIENT
Start: 2018-03-06 | End: 2018-03-06

## 2018-03-06 RX ORDER — CEFEPIME 1 G/1
INJECTION, POWDER, FOR SOLUTION INTRAMUSCULAR; INTRAVENOUS
Qty: 0 | Refills: 0 | Status: DISCONTINUED | OUTPATIENT
Start: 2018-03-06 | End: 2018-03-10

## 2018-03-06 RX ORDER — SODIUM CHLORIDE 9 MG/ML
1000 INJECTION, SOLUTION INTRAVENOUS
Qty: 0 | Refills: 0 | Status: DISCONTINUED | OUTPATIENT
Start: 2018-03-06 | End: 2018-03-06

## 2018-03-06 RX ORDER — CEFEPIME 1 G/1
2000 INJECTION, POWDER, FOR SOLUTION INTRAMUSCULAR; INTRAVENOUS EVERY 8 HOURS
Qty: 0 | Refills: 0 | Status: DISCONTINUED | OUTPATIENT
Start: 2018-03-06 | End: 2018-03-10

## 2018-03-06 RX ADMIN — ENOXAPARIN SODIUM 40 MILLIGRAM(S): 100 INJECTION SUBCUTANEOUS at 12:17

## 2018-03-06 RX ADMIN — PIPERACILLIN AND TAZOBACTAM 25 GRAM(S): 4; .5 INJECTION, POWDER, LYOPHILIZED, FOR SOLUTION INTRAVENOUS at 05:28

## 2018-03-06 RX ADMIN — HYDROMORPHONE HYDROCHLORIDE 30 MILLILITER(S): 2 INJECTION INTRAMUSCULAR; INTRAVENOUS; SUBCUTANEOUS at 07:19

## 2018-03-06 RX ADMIN — PANTOPRAZOLE SODIUM 40 MILLIGRAM(S): 20 TABLET, DELAYED RELEASE ORAL at 12:17

## 2018-03-06 RX ADMIN — HYDROMORPHONE HYDROCHLORIDE 30 MILLILITER(S): 2 INJECTION INTRAMUSCULAR; INTRAVENOUS; SUBCUTANEOUS at 19:31

## 2018-03-06 RX ADMIN — CEFEPIME 100 MILLIGRAM(S): 1 INJECTION, POWDER, FOR SOLUTION INTRAMUSCULAR; INTRAVENOUS at 21:31

## 2018-03-06 RX ADMIN — CEFEPIME 100 MILLIGRAM(S): 1 INJECTION, POWDER, FOR SOLUTION INTRAMUSCULAR; INTRAVENOUS at 12:17

## 2018-03-06 RX ADMIN — Medication 100 MILLILITER(S): at 16:01

## 2018-03-06 NOTE — PROGRESS NOTE ADULT - PROBLEM SELECTOR PLAN 1
1. Patient is reporting pain is well controlled with medication regimen  2. Meds      - patient remains NPO with NGT, continue dilaudid PCA  0.2mg demand dose [id number]; max 4mg per four hours  -continue Dilaudid 0.5mg IVP [id number] for severe BTP  Continue to monitor patient's respiratory status. Hold PCA for increased sedation, decreased respiratory rate.   3. will continue to follow patient  4. encouraged the use of PCA when he has pain. educated patient on how to use.  5. call with questions

## 2018-03-06 NOTE — SWALLOW VFSS/MBS ASSESSMENT ADULT - ROSENBEK'S PENETRATION ASPIRATION SCALE
(5) contrast contacts vocal cords, visible residue remains (penetration) (1) no aspiration, contrast does not enter airway

## 2018-03-06 NOTE — SWALLOW VFSS/MBS ASSESSMENT ADULT - DIAGNOSTIC IMPRESSIONS
Mild oral dysphagia marked by reduced lingual control with all PO. Functional pharyngeal stage of swallow for puree, mech soft, soft & regular solids. Mild to moderate pharyngeal dysphagia for thin & nectar thick fluids, marked by reduced lingual control, reduced tongue base retraction, reduced laryngeal elevation, decreased hyolaryngeal excursion & airway closure, with stasis & penetration with thin fluids, not eliminated with attempted compensatory postures.

## 2018-03-06 NOTE — CHART NOTE - NSCHARTNOTEFT_GEN_A_CORE
Source: Patient [x ]  Family [ ]   other [ ]    Current Diet: NPO (pending MBS) NGT     Patient reports [ ] nausea  [ ] vomiting [ ] diarrhea [ ] constipation  [ ]chewing problems [ ] swallowing issues  [ ] other:     PO intake:  < 50% [ ]   50-75%  [ ]   %  [ ]  other :    Source for PO intake [ ] Patient [ ] family [ ] chart [ ] staff [ ] other    Enteral /Parenteral Nutrition:     Current Weight: 2/28: 54kg   3/6 63kg     % Weight Change     Pertinent Medications: MEDICATIONS  (STANDING):  enoxaparin Injectable 40 milliGRAM(s) SubCutaneous daily  HYDROmorphone PCA (1 mG/mL) 30 milliLiter(s) PCA Continuous PCA Continuous  lactated ringers. 1000 milliLiter(s) (75 mL/Hr) IV Continuous <Continuous>  pantoprazole  Injectable 40 milliGRAM(s) IV Push daily  piperacillin/tazobactam IVPB. 3.375 Gram(s) IV Intermittent every 8 hours    MEDICATIONS  (PRN):  HYDROmorphone  Injectable 0.5 milliGRAM(s) IV Push every 3 hours PRN Severe Pain (7 - 10)  naloxone Injectable 0.1 milliGRAM(s) IV Push every 3 minutes PRN For ANY of the following changes in patient status:  A. RR LESS THAN 10 breaths per minute, B. Oxygen saturation LESS THAN 90%, C. Sedation score of 6  ondansetron Injectable 4 milliGRAM(s) IV Push every 6 hours PRN Nausea    Pertinent Labs: CBC Full  -  ( 06 Mar 2018 03:29 )  WBC Count : 8.3 K/uL  Hemoglobin : 9.9 g/dL  Hematocrit : 29.7 %  Platelet Count - Automated : 291 K/uL  Mean Cell Volume : 87.1 fl  Mean Cell Hemoglobin : 29.0 pg  Mean Cell Hemoglobin Concentration : 33.3 g/dL  Auto Neutrophil # : x  Auto Lymphocyte # : x  Auto Monocyte # : x  Auto Eosinophil # : x  Auto Basophil # : x  Auto Neutrophil % : x  Auto Lymphocyte % : x  Auto Monocyte % : x  Auto Eosinophil % : x  Auto Basophil % : x        Skin: surgical sites     Nutrition focused physical exam conducted - found signs of malnutrition [ ]absent [x ]present    Subcutaneous fat loss: [ ] Orbital fat pads region, [ ]Buccal fat region, [ ]Triceps region,  [ ]Ribs region    Muscle wasting: [ ]Temples region, [ ]Clavicle region, [ ]Shoulder region, [ ]Scapula region, [ ]Interosseous region,  [ ]thigh region, [ ]Calf region    Estimated Needs:   [ ] no change since previous assessment  [ ] recalculated:     Current Nutrition Diagnosis:  Pt remains at high nutrition risk secondary to severe protein calorie malnutrition related to increased nutrient needs with esophogeal cancer and inadequate energy intake as evidenced by 40# wt loss (25%) and severe muscle mass loss and fat loss. Pt currently NPO (pending MBS) today noted.     Recommendations:   1. Diet consistency per SLP  2. Enteral feeds of Vital @ 55ml/hr (x 20 hours) 1100ml/day 1650 kcal, 74gm protein (if unable to eat PO)       Monitoring and Evaluation:   [ ] PO intake [ x] Tolerance to diet prescription [X] Weights  [X] Follow up per protocol [X] Labs:

## 2018-03-06 NOTE — SWALLOW VFSS/MBS ASSESSMENT ADULT - ADDITIONAL RECOMMENDATIONS
Will follow for dysphagia therapy, as schedule permits   Avoid dual texture consistencies (fruit cocktail, cold cereal with milk, etc.)

## 2018-03-06 NOTE — PROGRESS NOTE ADULT - SUBJECTIVE AND OBJECTIVE BOX
Brief history:  2/26 s/p robotic [place] Mehran Esophagogastrectomy and jejunostomy tube placement.   2/27 J tube placement confirmed on gastrograffin study  2/28 Right pleural chest tube dressing saturated, removed at bedside, likely leaking around tube site without obvious drainage when evaluated.   3/1 CXR ? change in right pleural space, CT chest done without significant findings as per Dr. Blanco, NGT secured to suction, CXR improved, patient feeling better   3/3 - worsening abdominal pain, CT chest suspicious for leak, some free IP air as expected after abdominal surgery, less than on CT performed 3/1, CT Abd- no acute abdominal findings on preliminary report.    3/4 Gram negative rods in sputum febrile to 101.9 on vanc zosyn   3/5 ID consulted, presumed PNA, zosyn continues, vanco d/c'd      Subjective: c/o lower abdominal discomfort. Eager to eat and for d/c home. Denies CP, SOB, palpitations, N/V, other c/o.    T(C): 36.9 (03-05-18 @ 20:00), Max: 37.3 (03-05-18 @ 12:00)  HR: 113 (03-06-18 @ 00:00) (80 - 113), ST  BP: 119/71 (03-06-18 @ 00:00) (106/77 - 161/78)  RR: 20 (03-06-18 @ 00:00) (15 - 33)  SpO2: 94% (03-06-18 @ 00:00) (94% - 100%) on room air      I&O's Detail    04 Mar 2018 07:01  -  05 Mar 2018 07:00  --------------------------------------------------------  IN:    Enteral Tube Flush: 60 mL    lactated ringers.: 1800 mL    Solution: 500 mL    Solution: 50 mL    Solution: 275 mL  Total IN: 2685 mL    OUT:    Bulb: 60 mL    Nasoenteral Tube: 600 mL    Voided: 1325 mL  Total OUT: 1985 mL    Total NET: 700 mL      05 Mar 2018 07:01  -  06 Mar 2018 02:31  --------------------------------------------------------  IN:    Enteral Tube Flush: 40 mL    lactated ringers.: 1275 mL    Solution: 50 mL    Solution: 250 mL    Solution: 250 mL  Total IN: 1865 mL    OUT:    Bulb: 25 mL    Nasoenteral Tube: 150 mL    Voided: 1050 mL  Total OUT: 1225 mL    Total NET: 640 mL      LABS: All Lab data reviewed and analyzed                        10.7   5.4   )-----------( 259      ( 05 Mar 2018 04:13 )             31.5     03-05    137  |  96<L>  |  16.0  ----------------------------<  82  4.1   |  27.0  |  0.66    Ca    8.4<L>      05 Mar 2018 04:13  Phos  4.2     03-05  Mg     1.7     03-05  TPro  6.0<L>  /  Alb  2.5<L>  /  TBili  0.8  /  DBili  x   /  AST  18  /  ALT  18  /  AlkPhos  61  03-05    Culture - Urine (03.04.18 @ 05:44)    Specimen Source: .Urine Clean Catch (Midstream)    Culture Results:   No growth    Culture - Sputum . (03.04.18 @ 02:47)    Gram Stain:   Few White blood cells  Numerous Gram Negative Rods  Moderate Gram Positive Cocci in Pairs and Chains    Specimen Source: .Sputum Sputum    Culture Results:   Numerous Gram Negative Rods Identification and susceptibility to follow.  Numerous Routine respiratory marquis present  Culture in progress      RADIOLOGY:    < from: Xray Abdomen 1 View PORTABLE -Urgent (03.05.18 @ 07:13) >  Findings: The gas pattern is normal and nonspecific. Residualcontrast from CT examination is present throughout the colon. A jejunostomy tube is present in the left abdomen.  No ascites. There is no evidence of intestinal obstruction, significant bowel dilatation or free air. There are no abnormal calcifications. No organomegaly.   Osseous structures are unremarkable.  Impression: Residual contrast in colon.  < end of copied text >    < from: Xray Chest 1 View AP/PA. (03.05.18 @ 04:32) >  Findings:     No significant pneumothorax. Subcutaneous emphysema has resolved in the right supraclavicular region. Right chest tube is unchanged in position.  Nasogastric tube is present in the gastric pull-through to the right of the spine. Right apical pleural thickening unchanged.   Postop atelectasis left lower lobe unchanged. The left lung is otherwise clear  .    The pulmonary vasculature and aorta are normal for age. Heart size is unremarkable.   The thorax is normal for age.  Impression: Status postesophagectomy and gastric pull-through.  Postop atelectasis in left lower lobe unchanged.  No significant pneumothorax  < end of copied text >    3/6/18 CXR: pending      HOSPITAL MEDICATIONS: All medications reviewed and analyzed  MEDICATIONS  (STANDING):  enoxaparin Injectable 40 milliGRAM(s) SubCutaneous daily  HYDROmorphone PCA (1 mG/mL) 30 milliLiter(s) PCA Continuous PCA Continuous  lactated ringers. 1000 milliLiter(s) (75 mL/Hr) IV Continuous <Continuous>  pantoprazole  Injectable 40 milliGRAM(s) IV Push daily  piperacillin/tazobactam IVPB. 3.375 Gram(s) IV Intermittent every 8 hours    MEDICATIONS  (PRN):  HYDROmorphone  Injectable 0.5 milliGRAM(s) IV Push every 3 hours PRN Severe Pain (7 - 10)  naloxone Injectable 0.1 milliGRAM(s) IV Push every 3 minutes PRN For ANY of the following changes in patient status:  A. RR LESS THAN 10 breaths per minute, B. Oxygen saturation LESS THAN 90%, C. Sedation score of 6  ondansetron Injectable 4 milliGRAM(s) IV Push every 6 hours PRN Nausea        Physical Exam  Neuro: A+O x 3, non-focal, speech clear and intact  HEENT:  + NGT. NCAT, PERRL, EOMI. No conjuctival edema or iIcterus, no thrush.  No ETT.  Neck:  ROM intact, no JVD, no nodes or masses palpable, trachea midline, no tracheostomy  Pulm: CTA, good air entry, equal bilaterally, no rales/rhonchi/wheezing, no accessory muscle use noted  Chest: right Keny to bulb suction, no subQ emphysema, right thoracotomy with dsg C/D/I no heat or erythema  CV: regular rate, regular rhythm, +S1S2, no murmur or rub noted  Abd: soft, minimally tender, ND, + BS. Port holes x 5 with steris C/D/I, no heat or erythema  Ext: JOHNSON x 4, no edema, no cyanosis or clubbing, distal motor/neuro/circ intact  Skin: warm, dry, no rashes

## 2018-03-06 NOTE — SWALLOW VFSS/MBS ASSESSMENT ADULT - NS SWALLOW VFSS REC ASPIR MON
change of breathing pattern/upper respiratory infection/position upright (90Y)/pneumonia/oral hygiene/fever/cough/gurgly voice/throat clearing

## 2018-03-06 NOTE — SWALLOW VFSS/MBS ASSESSMENT ADULT - RECOMMENDED FEEDING/EATING TECHNIQUES
position upright (90 degrees)/oral hygiene/small sips/bites/maintain upright posture during/after eating for 30 mins position upright (90 degrees)/swallow x2 after each sip/maintain upright posture during/after eating for 30 mins/small sips/bites/oral hygiene

## 2018-03-06 NOTE — PROGRESS NOTE ADULT - ASSESSMENT
Patient seen and examined at bedside. Resting comfortably in bedside chair. Doing a crossword puzzle. He is s/p robotic [place] Mehran Esophagogastrectomy and jejunostomy tube placement on 2/26/18. He is reporting pain is well controlled with current regimen. Using dilaudid PCA as needed with good relief. Pain down to 5/10 with medications. Denies side effects He admits that at times he is feeling ok and he forgets to push the button to use it. No BM yet, s/p multiple enemas previously. He is reporting that he is eager to eat and get D/C home. Remains NPO with NGT in place.

## 2018-03-06 NOTE — SWALLOW VFSS/MBS ASSESSMENT ADULT - COMMENTS
57 year old male with esophageal cancer, Hodgkin's lymphoma, GERD. 2/26 s/p robotic [place] Mehran Esophagogastrectomy and jejunostomy tube placement. Abdominal pain, constipation, presumed PNA.

## 2018-03-06 NOTE — PROGRESS NOTE ADULT - SUBJECTIVE AND OBJECTIVE BOX
Chief Complaint: post operative pain    Patient seen and examined at bedside. Resting comfortably in bedside chair. Doing a crossword puzzle. He is s/p robotic [place] Mehran Esophagogastrectomy and jejunostomy tube placement on 2/26/18. He is reporting pain is well controlled with current regimen. Using dilaudid PCA as needed with good relief. Pain down to 5/10 with medications. Denies side effects He admits that at times he is feeling ok and he forgets to push the button to use it. No BM yet, s/p multiple enemas previously. He is reporting that he is eager to eat and get D/C home. Remains NPO with NGT in place.     PAST MEDICAL & SURGICAL HISTORY:  Esophageal cancer  Weight loss, unintentional  Hodgkins lymphoma: 25 years ago  Acid reflux disease  No significant past surgical history      SOCIAL HISTORY:  [ ] Denies Smoking, Alcohol, or Drug Use    Allergies    No Known Allergies    Intolerances        PAIN MEDICATIONS:  HYDROmorphone  Injectable 0.5 milliGRAM(s) IV Push every 3 hours PRN  HYDROmorphone PCA (1 mG/mL) 30 milliLiter(s) PCA Continuous PCA Continuous  ondansetron Injectable 4 milliGRAM(s) IV Push every 6 hours PRN    Heme:  enoxaparin Injectable 40 milliGRAM(s) SubCutaneous daily    Antibiotics:  piperacillin/tazobactam IVPB. 3.375 Gram(s) IV Intermittent every 8 hours    Cardiac:    Pulmonary:    Endocrine    GI:  pantoprazole  Injectable 40 milliGRAM(s) IV Push daily    All Other Meds:  lactated ringers. 1000 milliLiter(s) IV Continuous <Continuous>  naloxone Injectable 0.1 milliGRAM(s) IV Push every 3 minutes PRN      REVIEW OF SYSTEMS:  CONSTITUTIONAL: Neg  NECK: No pain or stiffness  RESPIRATORY: No cough, wheezing, chills or hemoptysis; No shortness of breath  CARDIOVASCULAR: No chest pain, palpitations, dizziness, or leg swelling  GASTROINTESTINAL: +abdominal pain secondary to constipation  GENITOURINARY: No dysuria, frequency, hematuria, or incontinence  NEUROLOGICAL: No headaches, memory loss, loss of strength, numbness, or tremors  SKIN: No itching, burning, rashes, or lesions   MUSCULOSKELETAL: No joint pain or swelling; No muscle, back, or extremity pain  PSYCHIATRIC: No depression, anxiety, mood swings, or difficulty sleeping  HEME/LYMPH: No easy bruising, or bleeding gums      PHYSICAL EXAM:    Vital Signs Last 24 Hrs  T(C): 37.1 (06 Mar 2018 07:00), Max: 37.3 (05 Mar 2018 12:00)  T(F): 98.7 (06 Mar 2018 07:00), Max: 99.2 (05 Mar 2018 12:00)  HR: 77 (06 Mar 2018 09:00) (76 - 113)  BP: 123/63 (06 Mar 2018 09:00) (106/77 - 141/75)  BP(mean): 86 (06 Mar 2018 09:00) (75 - 98)  RR: 18 (06 Mar 2018 09:00) (15 - 24)  SpO2: 95% (06 Mar 2018 09:00) (94% - 100%)    PAIN SCORE:   5      SCALE USED: (1-10 VNRS)       GENERAL: Comfortable, in no apparent distress, resting in bedside chair doing a crossword puzzle  HEENT:  Atraumatic, Normocephalic, NGT in place  NECK: Supple  LUNG: Respiration even and unlabored, no distress noted  ABDOMEN: non tender, mild distension, Dressing C/D/I  BACK: No midline bony tenderness to palpation, no step off or deformity noted.   EXTREMITIES:  JOHNSON X 4; 2+ Peripheral Pulses, No clubbing, cyanosis, or edema  NEUROLOGIC: Awake, alert and oriented X3;  No focal deficits. Motor strength 5/5. Sensation intact to light touch  SKIN: Warm and Dry, no rashes or lesions      LABS:                          9.9    8.3   )-----------( 291      ( 06 Mar 2018 03:29 )             29.7     03-06    133<L>  |  94<L>  |  15.0  ----------------------------<  86  4.1   |  27.0  |  0.73    Ca    8.1<L>      06 Mar 2018 03:29  Phos  3.8     03-06  Mg     2.2     03-06    TPro  6.0<L>  /  Alb  2.5<L>  /  TBili  0.8  /  DBili  x   /  AST  18  /  ALT  18  /  AlkPhos  61  03-05          Drug Screen:        RADIOLOGY:      [ ]  [place]  Reviewed and Copied to Chart

## 2018-03-06 NOTE — PROGRESS NOTE ADULT - ASSESSMENT
57 year old male with esophageal cancer, Hodgkin's lymphoma, GERD. 2/26 s/p robotic [place] Mehran Esophagogastrectomy and jejunostomy tube placement. Abdominal pain, constipation, presumed PNA.

## 2018-03-06 NOTE — PROGRESS NOTE ADULT - PROBLEM SELECTOR PLAN 1
admitted to CTICU 2/26  NGT to LIWS  Do not manipulate, d/c or use NGT for feeds/meds  strict NPO   HOB >45 degrees for aspiration precaution.  modified barium swallow this morning ordered per Dr Blanco

## 2018-03-06 NOTE — SWALLOW VFSS/MBS ASSESSMENT ADULT - RESIDUE IN VALLECULAE
Mild/reduced with subsequent swallow reduced with subsequent swallow/Mild/Trace Mild/reduced with subsequent swallow/Trace

## 2018-03-07 DIAGNOSIS — J15.1 PNEUMONIA DUE TO PSEUDOMONAS: ICD-10-CM

## 2018-03-07 DIAGNOSIS — J15.6 PNEUMONIA DUE TO OTHER GRAM-NEGATIVE BACTERIA: ICD-10-CM

## 2018-03-07 DIAGNOSIS — Z79.899 OTHER LONG TERM (CURRENT) DRUG THERAPY: ICD-10-CM

## 2018-03-07 LAB
ANION GAP SERPL CALC-SCNC: 10 MMOL/L — SIGNIFICANT CHANGE UP (ref 5–17)
BUN SERPL-MCNC: 15 MG/DL — SIGNIFICANT CHANGE UP (ref 8–20)
CALCIUM SERPL-MCNC: 8.1 MG/DL — LOW (ref 8.6–10.2)
CHLORIDE SERPL-SCNC: 96 MMOL/L — LOW (ref 98–107)
CO2 SERPL-SCNC: 29 MMOL/L — SIGNIFICANT CHANGE UP (ref 22–29)
CREAT SERPL-MCNC: 0.71 MG/DL — SIGNIFICANT CHANGE UP (ref 0.5–1.3)
GLUCOSE SERPL-MCNC: 127 MG/DL — HIGH (ref 70–115)
HCT VFR BLD CALC: 33.4 % — LOW (ref 42–52)
HGB BLD-MCNC: 11.2 G/DL — LOW (ref 14–18)
MAGNESIUM SERPL-MCNC: 1.9 MG/DL — SIGNIFICANT CHANGE UP (ref 1.6–2.6)
MCHC RBC-ENTMCNC: 28.8 PG — SIGNIFICANT CHANGE UP (ref 27–31)
MCHC RBC-ENTMCNC: 33.5 G/DL — SIGNIFICANT CHANGE UP (ref 32–36)
MCV RBC AUTO: 85.9 FL — SIGNIFICANT CHANGE UP (ref 80–94)
PHOSPHATE SERPL-MCNC: 3.2 MG/DL — SIGNIFICANT CHANGE UP (ref 2.4–4.7)
PLATELET # BLD AUTO: 347 K/UL — SIGNIFICANT CHANGE UP (ref 150–400)
POTASSIUM SERPL-MCNC: 4.1 MMOL/L — SIGNIFICANT CHANGE UP (ref 3.5–5.3)
POTASSIUM SERPL-SCNC: 4.1 MMOL/L — SIGNIFICANT CHANGE UP (ref 3.5–5.3)
RBC # BLD: 3.89 M/UL — LOW (ref 4.6–6.2)
RBC # FLD: 14.4 % — SIGNIFICANT CHANGE UP (ref 11–15.6)
SODIUM SERPL-SCNC: 135 MMOL/L — SIGNIFICANT CHANGE UP (ref 135–145)
WBC # BLD: 12.2 K/UL — HIGH (ref 4.8–10.8)
WBC # FLD AUTO: 12.2 K/UL — HIGH (ref 4.8–10.8)

## 2018-03-07 PROCEDURE — 99233 SBSQ HOSP IP/OBS HIGH 50: CPT

## 2018-03-07 PROCEDURE — 71045 X-RAY EXAM CHEST 1 VIEW: CPT | Mod: 26

## 2018-03-07 RX ORDER — MAGNESIUM SULFATE 500 MG/ML
2 VIAL (ML) INJECTION ONCE
Qty: 0 | Refills: 0 | Status: COMPLETED | OUTPATIENT
Start: 2018-03-07 | End: 2018-03-07

## 2018-03-07 RX ADMIN — CEFEPIME 100 MILLIGRAM(S): 1 INJECTION, POWDER, FOR SOLUTION INTRAMUSCULAR; INTRAVENOUS at 05:07

## 2018-03-07 RX ADMIN — Medication 50 GRAM(S): at 06:39

## 2018-03-07 RX ADMIN — CEFEPIME 100 MILLIGRAM(S): 1 INJECTION, POWDER, FOR SOLUTION INTRAMUSCULAR; INTRAVENOUS at 22:55

## 2018-03-07 RX ADMIN — ENOXAPARIN SODIUM 40 MILLIGRAM(S): 100 INJECTION SUBCUTANEOUS at 12:28

## 2018-03-07 RX ADMIN — HYDROMORPHONE HYDROCHLORIDE 0.5 MILLIGRAM(S): 2 INJECTION INTRAMUSCULAR; INTRAVENOUS; SUBCUTANEOUS at 22:26

## 2018-03-07 RX ADMIN — CEFEPIME 100 MILLIGRAM(S): 1 INJECTION, POWDER, FOR SOLUTION INTRAMUSCULAR; INTRAVENOUS at 14:08

## 2018-03-07 RX ADMIN — PANTOPRAZOLE SODIUM 40 MILLIGRAM(S): 20 TABLET, DELAYED RELEASE ORAL at 12:28

## 2018-03-07 RX ADMIN — HYDROMORPHONE HYDROCHLORIDE 30 MILLILITER(S): 2 INJECTION INTRAMUSCULAR; INTRAVENOUS; SUBCUTANEOUS at 08:23

## 2018-03-07 RX ADMIN — HYDROMORPHONE HYDROCHLORIDE 30 MILLILITER(S): 2 INJECTION INTRAMUSCULAR; INTRAVENOUS; SUBCUTANEOUS at 07:28

## 2018-03-07 RX ADMIN — HYDROMORPHONE HYDROCHLORIDE 0.5 MILLIGRAM(S): 2 INJECTION INTRAMUSCULAR; INTRAVENOUS; SUBCUTANEOUS at 22:55

## 2018-03-07 NOTE — PROGRESS NOTE ADULT - SUBJECTIVE AND OBJECTIVE BOX
[facility]  INFECTIOUS DISEASES AND INTERNAL MEDICINE at [place]  =======================================================  Jere Spear MD  Diplomates American Board of Internal Medicine and Infectious Diseases  =======================================================    R[facility] [id number]    Follow up:  Pneumonia    no complaints  No fevers    Allergies:  No Known Allergies      Antibiotics:   cefepime  IVPB 2000 milliGRAM(s) IV Intermittent every 8 hours       REVIEW OF SYSTEMS:  CONSTITUTIONAL:  No Fever or chills  HEENT:  No diplopia or blurred vision.  No earache, sore throat or runny nose.  CARDIOVASCULAR:  No pressure, squeezing, strangling, tightness, heaviness or aching about the chest, neck, axilla or epigastrium. + Pain at surgical site  RESPIRATORY:  No cough, shortness of breath  GASTROINTESTINAL:  No nausea, vomiting or diarrhea.  GENITOURINARY:  No dysuria, frequency  MUSCULOSKELETAL:  no joint aches, no muscle pain  SKIN:  No change in skin, hair or nails.  NEUROLOGIC:  No paresthesias, fasciculations  PSYCHIATRIC:  No disorder of thought or mood.  ENDOCRINE:  No heat or cold intolerance  HEMATOLOGICAL:  No easy bruising or bleeding.       Physical Exam:  Vital Signs Last 24 Hrs  T(C): 36.8 (07 Mar 2018 03:00), Max: 37.6 (06 Mar 2018 12:00)  T(F): 98.2 (07 Mar 2018 03:00), Max: 99.6 (06 Mar 2018 12:00)  HR: 98 (07 Mar 2018 09:00) (76 - 114)  BP: 95/50 (07 Mar 2018 09:00) (95/50 - 156/82)  BP(mean): 68 (07 Mar 2018 09:00) (68 - 111)  RR: 17 (07 Mar 2018 09:00) (12 - 36)  SpO2: 100% (07 Mar 2018 09:00) (74% - 100%)      GEN: NAD, pleasant  HEENT: normocephalic and atraumatic. EOMI. PERRL.  +NGT  NECK: Supple.  LUNGS: minimal wheezes  HEART: Regular rate and rhythm  ABDOMEN: Soft, nontender, and nondistended.  Positive bowel sounds.    : No CVA tenderness  EXTREMITIES: Without any edema.  MSK: No joint swelling  NEUROLOGIC: no focal deficits   PSYCHIATRIC: Appropriate affect .  SKIN: No rash        Labs:  03-07    135  |  96<L>  |  15.0  ----------------------------<  127<H>  4.1   |  29.0  |  0.71    Ca    8.1<L>      07 Mar 2018 03:28  Phos  3.2     03-07  Mg     1.9     03-07                 11.2   12.2  )-----------( 347      ( 07 Mar 2018 03:28 )             33.4     RECENT CULTURES:  03-04 @ 05:44 .Urine Clean Catch (Midstream)     No growth      03-04 @ 02:47 .Sputum Sputum Serratia marcescens  Pseudomonas aeruginosa    Numerous Serratia marcescens  Numerous Pseudomonas aeruginosa  Numerous Routine respiratory marquis present  Few White blood cells  Numerous Gram Negative Rods  Moderate Gram Positive Cocci in Pairs and Chains      03-04 @ 01:08 .Blood Blood-Peripheral     No growth at 48 hours      03-04 @ 01:07 .Blood Blood-Peripheral     No growth at 48 hours          EXAM:  CT CHEST                        PROCEDURE DATE:  03/06/2018    INTERPRETATION:  CLINICAL INFORMATION: Status post esophagectomy and   gastric pull-through, assess for anastomotic leak  COMPARISON: 3/3/2016   PROCEDURE:   CT of the Chest was performed without intravenous contrast.  Sagittal and coronal reformats were performed.  FINDINGS:  CHEST:   LUNGS AND LARGE AIRWAYS: Patent central airways.  Right apical scarring.   Extensive emphysema. Right basilar atelectasis.  PLEURA: Chest tube at the right apex. Small right pleural effusion and   intrapleural air is stable. Small left pleural effusion.   VESSELS: Within normal limits.  HEART: Heart size is normal. No pericardial effusion.  MEDIASTINUM AND NAS: Pneumomediastinum and posterior mediastinal air   fluid collection improved. There is reflux of oral contrast into the   esophagus. Gastroesophageal anastomosis and intrathoracic stomach are   contrast opacified. Again redemonstrated is a 1.7 x 1.1 cm extraluminal   collection of air and contrast medial to the anastomotic sutures. There   is no free contrast extravasation in the pleural cavity. The NG tube is   in the intrathoracic stomach.  CHEST WALL AND LOWER NECK: Within normal limits.  VISUALIZED UPPER ABDOMEN: Decreased pneumoperitoneum  BONES: Within normal limits.  IMPRESSION:   Persistent small contained leak medial to the anastomosis.   Decreased pneumomediastinum and posterior mediastinal fluid. Small stable   pleural effusions.

## 2018-03-07 NOTE — PROGRESS NOTE ADULT - SUBJECTIVE AND OBJECTIVE BOX
Subjective: seen and exmained with dr thomas   No acute events. Wants ngt out and wants to eat. Had MBS yesterday. acceptable, reviewed by dr martha brown abd pain, sob, fever/chills    tolerating J tube feeds.   NG tube clamped no nausea/vomiting           STATUS POST:  Robotic [place] Mehran esophagectomy    POST OPERATIVE DAY #: 9    MEDICATIONS  (STANDING):  cefepime  IVPB      cefepime  IVPB 2000 milliGRAM(s) IV Intermittent every 8 hours  enoxaparin Injectable 40 milliGRAM(s) SubCutaneous daily  HYDROmorphone PCA (1 mG/mL) 30 milliLiter(s) PCA Continuous PCA Continuous  pantoprazole  Injectable 40 milliGRAM(s) IV Push daily    MEDICATIONS  (PRN):  HYDROmorphone  Injectable 0.5 milliGRAM(s) IV Push every 3 hours PRN Severe Pain (7 - 10)  naloxone Injectable 0.1 milliGRAM(s) IV Push every 3 minutes PRN For ANY of the following changes in patient status:  A. RR LESS THAN 10 breaths per minute, B. Oxygen saturation LESS THAN 90%, C. Sedation score of 6  ondansetron Injectable 4 milliGRAM(s) IV Push every 6 hours PRN Nausea      Vital Signs Last 24 Hrs  T(C): 36.4 (07 Mar 2018 10:00), Max: 36.9 (06 Mar 2018 21:00)  T(F): 97.6 (07 Mar 2018 10:00), Max: 98.4 (06 Mar 2018 21:00)  HR: 99 (07 Mar 2018 15:00) (77 - 114)  BP: 103/55 (07 Mar 2018 15:00) (95/50 - 156/82)  BP(mean): 73 (07 Mar 2018 15:00) (68 - 111)  RR: 19 (07 Mar 2018 12:00) (14 - 36)  SpO2: 100% (07 Mar 2018 15:00) (74% - 100%)    Physical Exam:    Constitutional: NAD  HEENT: PERRL, EOMI  Neck: No JVD, FROM without pain  Respiratory: Breath Sounds equal & clear to auscultation, no accessory muscle use  Cardiovascular: Regular rate & rhythm, S1, S2  Gastrointestinal: Soft, non-tender  Extremities: No peripheral edema, No cyanosis  Neurological: A&O x 3; without gross deficit, GCS: 15  Musculoskeletal: No joint pain, swelling, deformity, or point tenderness; no limitation of movement      LABS:                        11.2   12.2  )-----------( 347      ( 07 Mar 2018 03:28 )             33.4     03-07    135  |  96<L>  |  15.0  ----------------------------<  127<H>  4.1   |  29.0  |  0.71    Ca    8.1<L>      07 Mar 2018 03:28  Phos  3.2     03-07  Mg     1.9     03-07

## 2018-03-07 NOTE — PROGRESS NOTE ADULT - PROBLEM SELECTOR PLAN 4
s/p robotic [place] Mehran Esophagogastrectomy and jejunostomy tube placement 2/6/18  s/p Chemo-radiation

## 2018-03-07 NOTE — PROGRESS NOTE ADULT - SUBJECTIVE AND OBJECTIVE BOX
Subjective:  57yMale POD#9  Robotic [place] Mehran esophagectomy    "I want to eat so bad"  deneis abd pain, sob, fever/chills    Past 24: CTA with oral contrast,  questionable leak at anastamosis.  Currently started Jtube feeds. NG tube clamped    Past Medical History:  Malignant neoplasm of esophagus  No h/o HF  Family history of prostate cancer in father (Father)  Handoff  Esophageal cancer  Weight loss, unintentional  Hodgkins lymphoma  Acid reflux disease  Esophageal cancer  Esophageal lesion  Esophageal cancer  Constipation  Generalized abdominal pain  Leukopenia  SIRS (systemic inflammatory response syndrome)  Pneumonia, bacterial  Prophylactic measure  Jejunostomy tube in situ  Malignant neoplasm of esophagus, unspecified location  Post-operative pain  Acid reflux disease  Malignant neoplasm of esophagus, unspecified  Lymph node biopsy  Robot-assisted esophagectomy using [email or web address] system  EGD  Jejunostomy feeding tube placement  No significant past surgical history  MALIGNANT NEOPLASM OF ESOPHAGU        cefepime  IVPB      cefepime  IVPB 2000 milliGRAM(s) IV Intermittent every 8 hours  enoxaparin Injectable 40 milliGRAM(s) SubCutaneous daily  HYDROmorphone  Injectable 0.5 milliGRAM(s) IV Push every 3 hours PRN  HYDROmorphone PCA (1 mG/mL) 30 milliLiter(s) PCA Continuous PCA Continuous  naloxone Injectable 0.1 milliGRAM(s) IV Push every 3 minutes PRN  ondansetron Injectable 4 milliGRAM(s) IV Push every 6 hours PRN  pantoprazole  Injectable 40 milliGRAM(s) IV Push daily  MEDICATIONS  (PRN):  HYDROmorphone  Injectable 0.5 milliGRAM(s) IV Push every 3 hours PRN Severe Pain (7 - 10)  naloxone Injectable 0.1 milliGRAM(s) IV Push every 3 minutes PRN For ANY of the following changes in patient status:  A. RR LESS THAN 10 breaths per minute, B. Oxygen saturation LESS THAN 90%, C. Sedation score of 6  ondansetron Injectable 4 milliGRAM(s) IV Push every 6 hours PRN Nausea      Daily     Daily Weight in k.1 (06 Mar 2018 02:00)                              9.9    8.3   )-----------( 291      ( 06 Mar 2018 03:29 )             29.7   03-06    133<L>  |  94<L>  |  15.0  ----------------------------<  86  4.1   |  27.0  |  0.73    Ca    8.1<L>      06 Mar 2018 03:29  Phos  3.8     -  Mg     2.2     -    TPro  6.0<L>  /  Alb  2.5<L>  /  TBili  0.8  /  DBili  x   /  AST  18  /  ALT  18  /  AlkPhos  61  -            Objective:  T(C): 36.9 (18 @ 21:00), Max: 37.6 (18 @ 12:00)  HR: 100 (18 @ 23:00) (76 - 108)  BP: 131/64 (18 @ 23:00) (103/59 - 156/82)  RR: 20 (18 @ 23:00) (12 - 24)  SpO2: 92% (18 @ 23:00) (84% - 100%)  Wt(kg): --CAPILLARY BLOOD GLUCOSE      I&O's Summary    05 Mar 2018 07:  -  06 Mar 2018 07:00  --------------------------------------------------------  IN: 2365 mL / OUT: 1980 mL / NET: 385 mL    06 Mar 2018 07:  -  07 Mar 2018 00:21  --------------------------------------------------------  IN: 1186 mL / OUT: 650 mL / NET: 536 mL        Physical Exam:  Neuro: a0x3  Pulm: cta b/l  +right chest bulb to suction   no subq air  CV:s1/s2  Abd:soft + BS  Ext: warm, no edema  Inc:c/d/i

## 2018-03-07 NOTE — PROGRESS NOTE ADULT - ASSESSMENT
57 year old male with esophageal cancer, Hodgkin's lymphoma, GERD. 2/26 s/p robotic [place] Mehran Esophagogastrectomy and jejunostomy tube placement. Abdominal pain, constipation, presumed PNA.    NGT removed   HOB >45 degrees for aspiration precaution.  modified barium swallow - Nectar thick liquids only once cleared by attending  CTA c/a/p    as per dr. cloud no leak.  care per CICU, monitor resp status, intermittent SOB throughout the day. Watch O2 requirements. being treated for PNA, ID recs appreciated.   agree with current treatment, will cont to follow

## 2018-03-07 NOTE — PROGRESS NOTE ADULT - PROBLEM SELECTOR PLAN 1
admitted to CTICU 2/26  NGT clamped  Do not manipulate, d/c or use NGT for feeds/meds  strict NPO   HOB >45 degrees for aspiration precaution.  modified barium swallow - Nectar thick once able to take PO admitted to CTICU 2/26  NGT clamped  Do not manipulate, d/c or use NGT for feeds/meds  strict NPO   HOB >45 degrees for aspiration precaution.  modified barium swallow - Nectar thick liquids only once cleared by attending  CTA c/a/p    as per dr. cloud no leak

## 2018-03-07 NOTE — PROGRESS NOTE ADULT - ASSESSMENT
58 y/o M with esophageal Ca s/p robotic [place] Mehran Esophagogastrectomy and jejunostomy tube placement 2/6/18, developed fever post op 3/4/18, sputum culture with Serratia marcescens and Pseudomonas aeruginosa

## 2018-03-07 NOTE — PROGRESS NOTE ADULT - PROBLEM SELECTOR PLAN 1
sputum culture with Serratia marcescens and Pseudomonas aeruginosa  Switched to Cefepime due to sensitivities  Patient failed swallow study, only can have nectar thickened, at aspiration risk  Blood cultures no growth  Afebrile  Trend Leukocytosis

## 2018-03-07 NOTE — PROGRESS NOTE ADULT - ASSESSMENT
57 year old male with esophageal cancer, Hodgkin's lymphoma, GERD. 2/26 s/p robotic [place] Mehran Esophagogastrectomy and jejunostomy tube placement. Abdominal pain, constipation, presumed PNA.

## 2018-03-07 NOTE — PROGRESS NOTE ADULT - PROBLEM SELECTOR PLAN 2
sputum culture with Serratia marcescens and Pseudomonas aeruginosa  Switched to Cefepime due to sensitivities

## 2018-03-08 DIAGNOSIS — I46.9 CARDIAC ARREST, CAUSE UNSPECIFIED: ICD-10-CM

## 2018-03-08 DIAGNOSIS — R09.2 RESPIRATORY ARREST: ICD-10-CM

## 2018-03-08 DIAGNOSIS — R57.9 SHOCK, UNSPECIFIED: ICD-10-CM

## 2018-03-08 DIAGNOSIS — N17.9 ACUTE KIDNEY FAILURE, UNSPECIFIED: ICD-10-CM

## 2018-03-08 DIAGNOSIS — J80 ACUTE RESPIRATORY DISTRESS SYNDROME: ICD-10-CM

## 2018-03-08 LAB
ALBUMIN SERPL ELPH-MCNC: 2.5 G/DL — LOW (ref 3.3–5.2)
ALP SERPL-CCNC: 157 U/L — HIGH (ref 40–120)
ALT FLD-CCNC: 44 U/L — HIGH
ANION GAP SERPL CALC-SCNC: 8 MMOL/L — SIGNIFICANT CHANGE UP (ref 5–17)
ANION GAP SERPL CALC-SCNC: 9 MMOL/L — SIGNIFICANT CHANGE UP (ref 5–17)
ANION GAP SERPL CALC-SCNC: 9 MMOL/L — SIGNIFICANT CHANGE UP (ref 5–17)
APTT BLD: 29.1 SEC — SIGNIFICANT CHANGE UP (ref 27.5–37.4)
AST SERPL-CCNC: 39 U/L — SIGNIFICANT CHANGE UP
BILIRUB SERPL-MCNC: 0.2 MG/DL — LOW (ref 0.4–2)
BUN SERPL-MCNC: 13 MG/DL — SIGNIFICANT CHANGE UP (ref 8–20)
BUN SERPL-MCNC: 17 MG/DL — SIGNIFICANT CHANGE UP (ref 8–20)
BUN SERPL-MCNC: 23 MG/DL — HIGH (ref 8–20)
CALCIUM SERPL-MCNC: 7.9 MG/DL — LOW (ref 8.6–10.2)
CALCIUM SERPL-MCNC: 8.3 MG/DL — LOW (ref 8.6–10.2)
CALCIUM SERPL-MCNC: 8.5 MG/DL — LOW (ref 8.6–10.2)
CHLORIDE SERPL-SCNC: 100 MMOL/L — SIGNIFICANT CHANGE UP (ref 98–107)
CHLORIDE SERPL-SCNC: 98 MMOL/L — SIGNIFICANT CHANGE UP (ref 98–107)
CHLORIDE SERPL-SCNC: 98 MMOL/L — SIGNIFICANT CHANGE UP (ref 98–107)
CK SERPL-CCNC: 31 U/L — SIGNIFICANT CHANGE UP (ref 30–200)
CK SERPL-CCNC: 33 U/L — SIGNIFICANT CHANGE UP (ref 30–200)
CO2 SERPL-SCNC: 29 MMOL/L — SIGNIFICANT CHANGE UP (ref 22–29)
CO2 SERPL-SCNC: 33 MMOL/L — HIGH (ref 22–29)
CO2 SERPL-SCNC: 34 MMOL/L — HIGH (ref 22–29)
CREAT SERPL-MCNC: 0.6 MG/DL — SIGNIFICANT CHANGE UP (ref 0.5–1.3)
CREAT SERPL-MCNC: 0.81 MG/DL — SIGNIFICANT CHANGE UP (ref 0.5–1.3)
CREAT SERPL-MCNC: 1.31 MG/DL — HIGH (ref 0.5–1.3)
GAS PNL BLDA: SIGNIFICANT CHANGE UP
GLUCOSE SERPL-MCNC: 101 MG/DL — SIGNIFICANT CHANGE UP (ref 70–115)
GLUCOSE SERPL-MCNC: 143 MG/DL — HIGH (ref 70–115)
GLUCOSE SERPL-MCNC: 154 MG/DL — HIGH (ref 70–115)
HCT VFR BLD CALC: 30.1 % — LOW (ref 42–52)
HCT VFR BLD CALC: 30.7 % — LOW (ref 42–52)
HCT VFR BLD CALC: 30.7 % — LOW (ref 42–52)
HGB BLD-MCNC: 10.2 G/DL — LOW (ref 14–18)
HGB BLD-MCNC: 10.3 G/DL — LOW (ref 14–18)
HGB BLD-MCNC: 9.9 G/DL — LOW (ref 14–18)
INR BLD: 1.28 RATIO — HIGH (ref 0.88–1.16)
MAGNESIUM SERPL-MCNC: 2.2 MG/DL — SIGNIFICANT CHANGE UP (ref 1.6–2.6)
MAGNESIUM SERPL-MCNC: 2.7 MG/DL — HIGH (ref 1.6–2.6)
MAGNESIUM SERPL-MCNC: 2.7 MG/DL — HIGH (ref 1.6–2.6)
MCHC RBC-ENTMCNC: 28.9 PG — SIGNIFICANT CHANGE UP (ref 27–31)
MCHC RBC-ENTMCNC: 29.1 PG — SIGNIFICANT CHANGE UP (ref 27–31)
MCHC RBC-ENTMCNC: 29.5 PG — SIGNIFICANT CHANGE UP (ref 27–31)
MCHC RBC-ENTMCNC: 32.2 G/DL — SIGNIFICANT CHANGE UP (ref 32–36)
MCHC RBC-ENTMCNC: 33.6 G/DL — SIGNIFICANT CHANGE UP (ref 32–36)
MCHC RBC-ENTMCNC: 33.9 G/DL — SIGNIFICANT CHANGE UP (ref 32–36)
MCV RBC AUTO: 86 FL — SIGNIFICANT CHANGE UP (ref 80–94)
MCV RBC AUTO: 88 FL — SIGNIFICANT CHANGE UP (ref 80–94)
MCV RBC AUTO: 89.8 FL — SIGNIFICANT CHANGE UP (ref 80–94)
PHOSPHATE SERPL-MCNC: 1.9 MG/DL — LOW (ref 2.4–4.7)
PLATELET # BLD AUTO: 351 K/UL — SIGNIFICANT CHANGE UP (ref 150–400)
PLATELET # BLD AUTO: 406 K/UL — HIGH (ref 150–400)
PLATELET # BLD AUTO: 423 K/UL — HIGH (ref 150–400)
POTASSIUM SERPL-MCNC: 4.2 MMOL/L — SIGNIFICANT CHANGE UP (ref 3.5–5.3)
POTASSIUM SERPL-MCNC: 4.3 MMOL/L — SIGNIFICANT CHANGE UP (ref 3.5–5.3)
POTASSIUM SERPL-MCNC: 5 MMOL/L — SIGNIFICANT CHANGE UP (ref 3.5–5.3)
POTASSIUM SERPL-SCNC: 4.2 MMOL/L — SIGNIFICANT CHANGE UP (ref 3.5–5.3)
POTASSIUM SERPL-SCNC: 4.3 MMOL/L — SIGNIFICANT CHANGE UP (ref 3.5–5.3)
POTASSIUM SERPL-SCNC: 5 MMOL/L — SIGNIFICANT CHANGE UP (ref 3.5–5.3)
PROT SERPL-MCNC: 6.1 G/DL — LOW (ref 6.6–8.7)
PROTHROM AB SERPL-ACNC: 14.2 SEC — HIGH (ref 9.8–12.7)
RBC # BLD: 3.42 M/UL — LOW (ref 4.6–6.2)
RBC # BLD: 3.49 M/UL — LOW (ref 4.6–6.2)
RBC # BLD: 3.5 M/UL — LOW (ref 4.6–6.2)
RBC # FLD: 14.5 % — SIGNIFICANT CHANGE UP (ref 11–15.6)
RBC # FLD: 14.8 % — SIGNIFICANT CHANGE UP (ref 11–15.6)
RBC # FLD: 15.4 % — SIGNIFICANT CHANGE UP (ref 11–15.6)
SODIUM SERPL-SCNC: 138 MMOL/L — SIGNIFICANT CHANGE UP (ref 135–145)
SODIUM SERPL-SCNC: 140 MMOL/L — SIGNIFICANT CHANGE UP (ref 135–145)
SODIUM SERPL-SCNC: 140 MMOL/L — SIGNIFICANT CHANGE UP (ref 135–145)
TROPONIN T SERPL-MCNC: <0.01 NG/ML — SIGNIFICANT CHANGE UP (ref 0–0.06)
TROPONIN T SERPL-MCNC: <0.01 NG/ML — SIGNIFICANT CHANGE UP (ref 0–0.06)
WBC # BLD: 15.3 K/UL — HIGH (ref 4.8–10.8)
WBC # BLD: 2.9 K/UL — LOW (ref 4.8–10.8)
WBC # BLD: 9.1 K/UL — SIGNIFICANT CHANGE UP (ref 4.8–10.8)
WBC # FLD AUTO: 15.3 K/UL — HIGH (ref 4.8–10.8)
WBC # FLD AUTO: 2.9 K/UL — LOW (ref 4.8–10.8)
WBC # FLD AUTO: 9.1 K/UL — SIGNIFICANT CHANGE UP (ref 4.8–10.8)

## 2018-03-08 PROCEDURE — 36620 INSERTION CATHETER ARTERY: CPT | Mod: 58,59

## 2018-03-08 PROCEDURE — 36556 INSERT NON-TUNNEL CV CATH: CPT | Mod: 58

## 2018-03-08 PROCEDURE — 93306 TTE W/DOPPLER COMPLETE: CPT | Mod: 26

## 2018-03-08 PROCEDURE — 74018 RADEX ABDOMEN 1 VIEW: CPT | Mod: 26,59

## 2018-03-08 PROCEDURE — 99291 CRITICAL CARE FIRST HOUR: CPT

## 2018-03-08 PROCEDURE — 71045 X-RAY EXAM CHEST 1 VIEW: CPT | Mod: 26,77

## 2018-03-08 PROCEDURE — 99292 CRITICAL CARE ADDL 30 MIN: CPT

## 2018-03-08 PROCEDURE — 99233 SBSQ HOSP IP/OBS HIGH 50: CPT

## 2018-03-08 PROCEDURE — 99223 1ST HOSP IP/OBS HIGH 75: CPT

## 2018-03-08 PROCEDURE — 36620 INSERTION CATHETER ARTERY: CPT | Mod: 78,59

## 2018-03-08 PROCEDURE — 71045 X-RAY EXAM CHEST 1 VIEW: CPT | Mod: 26,77,76

## 2018-03-08 PROCEDURE — 93010 ELECTROCARDIOGRAM REPORT: CPT

## 2018-03-08 RX ORDER — SODIUM CHLORIDE 9 MG/ML
500 INJECTION INTRAMUSCULAR; INTRAVENOUS; SUBCUTANEOUS ONCE
Qty: 0 | Refills: 0 | Status: COMPLETED | OUTPATIENT
Start: 2018-03-08 | End: 2018-03-08

## 2018-03-08 RX ORDER — PROPOFOL 10 MG/ML
10 INJECTION, EMULSION INTRAVENOUS
Qty: 1000 | Refills: 0 | Status: DISCONTINUED | OUTPATIENT
Start: 2018-03-08 | End: 2018-03-14

## 2018-03-08 RX ORDER — SODIUM BICARBONATE 1 MEQ/ML
25 SYRINGE (ML) INTRAVENOUS ONCE
Qty: 0 | Refills: 0 | Status: DISCONTINUED | OUTPATIENT
Start: 2018-03-08 | End: 2018-03-08

## 2018-03-08 RX ORDER — SODIUM CHLORIDE 9 MG/ML
1000 INJECTION INTRAMUSCULAR; INTRAVENOUS; SUBCUTANEOUS
Qty: 0 | Refills: 0 | Status: DISCONTINUED | OUTPATIENT
Start: 2018-03-08 | End: 2018-03-08

## 2018-03-08 RX ORDER — VASOPRESSIN 20 [USP'U]/ML
0.05 INJECTION INTRAVENOUS
Qty: 100 | Refills: 0 | Status: DISCONTINUED | OUTPATIENT
Start: 2018-03-08 | End: 2018-03-09

## 2018-03-08 RX ORDER — SODIUM BICARBONATE 1 MEQ/ML
50 SYRINGE (ML) INTRAVENOUS ONCE
Qty: 0 | Refills: 0 | Status: COMPLETED | OUTPATIENT
Start: 2018-03-08 | End: 2018-03-08

## 2018-03-08 RX ORDER — VECURONIUM BROMIDE 20 MG/1
5 INJECTION, POWDER, FOR SOLUTION INTRAVENOUS ONCE
Qty: 0 | Refills: 0 | Status: COMPLETED | OUTPATIENT
Start: 2018-03-08 | End: 2018-03-08

## 2018-03-08 RX ORDER — ACETAMINOPHEN 500 MG
1000 TABLET ORAL ONCE
Qty: 0 | Refills: 0 | Status: COMPLETED | OUTPATIENT
Start: 2018-03-08 | End: 2018-03-08

## 2018-03-08 RX ORDER — FUROSEMIDE 40 MG
80 TABLET ORAL ONCE
Qty: 0 | Refills: 0 | Status: COMPLETED | OUTPATIENT
Start: 2018-03-08 | End: 2018-03-08

## 2018-03-08 RX ORDER — SODIUM CHLORIDE 9 MG/ML
500 INJECTION, SOLUTION INTRAVENOUS ONCE
Qty: 0 | Refills: 0 | Status: DISCONTINUED | OUTPATIENT
Start: 2018-03-08 | End: 2018-03-08

## 2018-03-08 RX ORDER — SODIUM BICARBONATE 1 MEQ/ML
25 SYRINGE (ML) INTRAVENOUS
Qty: 0 | Refills: 0 | Status: DISCONTINUED | OUTPATIENT
Start: 2018-03-08 | End: 2018-03-08

## 2018-03-08 RX ORDER — FUROSEMIDE 40 MG
5 TABLET ORAL
Qty: 500 | Refills: 0 | Status: DISCONTINUED | OUTPATIENT
Start: 2018-03-08 | End: 2018-03-11

## 2018-03-08 RX ORDER — CHLORHEXIDINE GLUCONATE 213 G/1000ML
15 SOLUTION TOPICAL
Qty: 0 | Refills: 0 | Status: DISCONTINUED | OUTPATIENT
Start: 2018-03-08 | End: 2018-03-14

## 2018-03-08 RX ORDER — MIDAZOLAM HYDROCHLORIDE 1 MG/ML
2 INJECTION, SOLUTION INTRAMUSCULAR; INTRAVENOUS ONCE
Qty: 0 | Refills: 0 | Status: DISCONTINUED | OUTPATIENT
Start: 2018-03-08 | End: 2018-03-08

## 2018-03-08 RX ORDER — SODIUM BICARBONATE 1 MEQ/ML
0.07 SYRINGE (ML) INTRAVENOUS
Qty: 50 | Refills: 0 | Status: DISCONTINUED | OUTPATIENT
Start: 2018-03-08 | End: 2018-03-09

## 2018-03-08 RX ORDER — MAGNESIUM SULFATE 500 MG/ML
2 VIAL (ML) INJECTION ONCE
Qty: 0 | Refills: 0 | Status: COMPLETED | OUTPATIENT
Start: 2018-03-08 | End: 2018-03-08

## 2018-03-08 RX ORDER — CISATRACURIUM BESYLATE 2 MG/ML
1 INJECTION INTRAVENOUS
Qty: 200 | Refills: 0 | Status: DISCONTINUED | OUTPATIENT
Start: 2018-03-08 | End: 2018-03-10

## 2018-03-08 RX ORDER — PROPOFOL 10 MG/ML
30.86 INJECTION, EMULSION INTRAVENOUS
Qty: 500 | Refills: 0 | Status: DISCONTINUED | OUTPATIENT
Start: 2018-03-08 | End: 2018-03-08

## 2018-03-08 RX ORDER — CEFUROXIME AXETIL 250 MG
TABLET ORAL
Qty: 0 | Refills: 0 | Status: DISCONTINUED | OUTPATIENT
Start: 2018-03-08 | End: 2018-03-08

## 2018-03-08 RX ORDER — NOREPINEPHRINE BITARTRATE/D5W 8 MG/250ML
0.05 PLASTIC BAG, INJECTION (ML) INTRAVENOUS
Qty: 8 | Refills: 0 | Status: DISCONTINUED | OUTPATIENT
Start: 2018-03-08 | End: 2018-03-10

## 2018-03-08 RX ORDER — VANCOMYCIN HCL 1 G
1000 VIAL (EA) INTRAVENOUS EVERY 8 HOURS
Qty: 0 | Refills: 0 | Status: DISCONTINUED | OUTPATIENT
Start: 2018-03-08 | End: 2018-03-10

## 2018-03-08 RX ORDER — MIDAZOLAM HYDROCHLORIDE 1 MG/ML
2 INJECTION, SOLUTION INTRAMUSCULAR; INTRAVENOUS
Qty: 100 | Refills: 0 | Status: DISCONTINUED | OUTPATIENT
Start: 2018-03-08 | End: 2018-03-10

## 2018-03-08 RX ORDER — FUROSEMIDE 40 MG
10 TABLET ORAL
Qty: 500 | Refills: 0 | Status: DISCONTINUED | OUTPATIENT
Start: 2018-03-08 | End: 2018-03-09

## 2018-03-08 RX ORDER — MULTIVIT WITH MIN/MFOLATE/K2 340-15/3 G
300 POWDER (GRAM) ORAL ONCE
Qty: 0 | Refills: 0 | Status: DISCONTINUED | OUTPATIENT
Start: 2018-03-08 | End: 2018-03-08

## 2018-03-08 RX ADMIN — Medication 50 MILLIEQUIVALENT(S): at 13:44

## 2018-03-08 RX ADMIN — HYDROMORPHONE HYDROCHLORIDE 0.5 MILLIGRAM(S): 2 INJECTION INTRAMUSCULAR; INTRAVENOUS; SUBCUTANEOUS at 02:06

## 2018-03-08 RX ADMIN — CHLORHEXIDINE GLUCONATE 15 MILLILITER(S): 213 SOLUTION TOPICAL at 17:24

## 2018-03-08 RX ADMIN — VASOPRESSIN 3 UNIT(S)/MIN: 20 INJECTION INTRAVENOUS at 17:24

## 2018-03-08 RX ADMIN — Medication 5 MG/HR: at 21:47

## 2018-03-08 RX ADMIN — Medication 250 MILLIGRAM(S): at 20:18

## 2018-03-08 RX ADMIN — SODIUM CHLORIDE 2000 MILLILITER(S): 9 INJECTION INTRAMUSCULAR; INTRAVENOUS; SUBCUTANEOUS at 16:21

## 2018-03-08 RX ADMIN — Medication 250 MILLIGRAM(S): at 10:52

## 2018-03-08 RX ADMIN — Medication 1000 MILLIGRAM(S): at 02:30

## 2018-03-08 RX ADMIN — HYDROMORPHONE HYDROCHLORIDE 0.5 MILLIGRAM(S): 2 INJECTION INTRAMUSCULAR; INTRAVENOUS; SUBCUTANEOUS at 01:49

## 2018-03-08 RX ADMIN — MIDAZOLAM HYDROCHLORIDE 2 MG/HR: 1 INJECTION, SOLUTION INTRAMUSCULAR; INTRAVENOUS at 20:17

## 2018-03-08 RX ADMIN — CEFEPIME 100 MILLIGRAM(S): 1 INJECTION, POWDER, FOR SOLUTION INTRAMUSCULAR; INTRAVENOUS at 16:22

## 2018-03-08 RX ADMIN — MIDAZOLAM HYDROCHLORIDE 2 MILLIGRAM(S): 1 INJECTION, SOLUTION INTRAMUSCULAR; INTRAVENOUS at 19:33

## 2018-03-08 RX ADMIN — CEFEPIME 100 MILLIGRAM(S): 1 INJECTION, POWDER, FOR SOLUTION INTRAMUSCULAR; INTRAVENOUS at 21:47

## 2018-03-08 RX ADMIN — VECURONIUM BROMIDE 5 MILLIGRAM(S): 20 INJECTION, POWDER, FOR SOLUTION INTRAVENOUS at 11:43

## 2018-03-08 RX ADMIN — Medication 80 MILLIGRAM(S): at 21:48

## 2018-03-08 RX ADMIN — SODIUM CHLORIDE 2000 MILLILITER(S): 9 INJECTION INTRAMUSCULAR; INTRAVENOUS; SUBCUTANEOUS at 17:25

## 2018-03-08 RX ADMIN — Medication 50 GRAM(S): at 07:18

## 2018-03-08 RX ADMIN — PANTOPRAZOLE SODIUM 40 MILLIGRAM(S): 20 TABLET, DELAYED RELEASE ORAL at 12:00

## 2018-03-08 RX ADMIN — PROPOFOL 3.24 MICROGRAM(S)/KG/MIN: 10 INJECTION, EMULSION INTRAVENOUS at 09:00

## 2018-03-08 RX ADMIN — CEFEPIME 100 MILLIGRAM(S): 1 INJECTION, POWDER, FOR SOLUTION INTRAMUSCULAR; INTRAVENOUS at 06:07

## 2018-03-08 RX ADMIN — Medication 400 MILLIGRAM(S): at 02:10

## 2018-03-08 RX ADMIN — PROPOFOL 3.24 MICROGRAM(S)/KG/MIN: 10 INJECTION, EMULSION INTRAVENOUS at 17:24

## 2018-03-08 RX ADMIN — Medication 5 MICROGRAM(S)/KG/MIN: at 12:50

## 2018-03-08 NOTE — PROGRESS NOTE ADULT - PROBLEM SELECTOR PLAN 2
tube feeds started  flush Q6 per Dr Olson tolerating TF throughout day  now with abdominal pain> consider holding, will reassess

## 2018-03-08 NOTE — PROGRESS NOTE ADULT - PROBLEM SELECTOR PLAN 5
s/p robotic [facility] Esophagogastrectomy and jejunostomy tube placement 2/6/18  s/p Chemo-radiation

## 2018-03-08 NOTE — PROGRESS NOTE ADULT - PROBLEM SELECTOR PLAN 5
sputum cx with GNR, continue zosyn for now, deescalate once speciated  vanco d/c'marcos sputum with serratia and pseudomonas  ID following  cont cefepime

## 2018-03-08 NOTE — PROGRESS NOTE ADULT - PROBLEM SELECTOR PLAN 2
sputum culture with Serratia marcescens and Pseudomonas aeruginosa  Continue Cefepime due to sensitivities  Blood cultures no growth  Afebrile  Trend Leukocytosis  Can obtain tracheal aspirate given new respiratory arrest   Add Vancomycin, follow up GPC in sputum culture

## 2018-03-08 NOTE — CONSULT NOTE ADULT - ATTENDING COMMENTS
I discussed my recommendations with Dr Barron, [facility], and Dr Wise. I discussed my recommendations with Dr Barron, the CICU PA staff, and Dr Wise.  This patient is at high risk for further decompensation, and would defer consideration of ECMO to CICU staff if prone positioning doesn't assist in oxygenation/ventilation if he doesn't have exclusion criteria.

## 2018-03-08 NOTE — CONSULT NOTE ADULT - PROBLEM SELECTOR RECOMMENDATION 2
Difficulties with oxygenation and ventilation; etiology aspiration event.  Recommendations:    1. Change RR to 30/min; at 40/min there was autoPEEP and no ability to fully .  2. Change TV to 440mL from 550mL; this comports with the 6 mL/kg predicted body weight.  Understood that the  volume is only 350 for this -- however, there is clear air leak around the cuff (audible) and this does not alter the ventilation component -- only losing some PEEP.  3. Recommend increased PEEP to 15 cmH20 and NO at 40 ppm for now.  4. Start midazolam infusion at 2mg/hr; continue propofol at 5 mcg/kg/min; continue nimbex infusion but track train of four, titrate nimbex to 2/4.  If becomes hypotensive can d/c propofol but if nimbex is to be continued would recommend continuing some anxiolytic/hypnotic (ie midazolam)  5. Check endotracheal cuff pressure and decrease -- too taut now.  Maintain pressure 20-30.  6. Discontinue saline infusion.  7. Would consider discontinuation of bicarbonate infusion; pH>7.25 is acceptable.  Aim for negative fluid balance next 12-24 hours; start furosemide infusion.  Given the hypoxic insult to the kidney, unlikely that furosemide will be successful.  Start at 10mg/hr.  8. Prone positioning; order prone bed, prone per protocol (18h prone, 6h supine as tolerated)  9. There is some evidence that steroids can be helpful in early ARDS.  Consider methylprednisolone 1mg/kg/day (ie for this patient would recommend 30mg IV q12 ); take into consideration wound healing postoperatively and the combination of steroids and paralytics (which cause worse ICU-associated weakness in recovery). Difficulties with oxygenation and ventilation; etiology aspiration event.  Recommendations:    1. Change RR to 30/min; at 40/min there was autoPEEP and no ability to fully .  2. Change TV to 440mL from 550mL; this comports with the 6 mL/kg predicted body weight.  Understood that the  volume is only 350 for this -- however, there is clear air leak around the cuff (audible) and this does not alter the ventilation component -- only losing some PEEP.  3. Recommend increased PEEP to 12 cmH20 and NO at 40 ppm for now.  When I increased the PEEP to 15, the returned volume plummeted to 220mL from 310mL, which will indeed decrease ventilation if the volume of air escapes prior to insufflating the alveoli.  4. Start midazolam infusion at 2mg/hr; continue propofol at 5 mcg/kg/min; continue nimbex infusion but track train of four, titrate nimbex to 2/4.  If becomes hypotensive can d/c propofol but if nimbex is to be continued would recommend continuing some anxiolytic/hypnotic (ie midazolam)  5. Check endotracheal cuff pressure and decrease -- too taut now.  Maintain pressure 20-30.  6. Discontinue saline infusion.  7. Would consider discontinuation of bicarbonate infusion; pH>7.25 is acceptable.  Aim for negative fluid balance next 12-24 hours; start furosemide infusion.  Given the hypoxic insult to the kidney, unlikely that furosemide will be successful.  Start at 10mg/hr.  8. Prone positioning; order prone bed, prone per protocol (18h prone, 6h supine as tolerated)  9. There is some evidence that steroids can be helpful in early ARDS.  Consider methylprednisolone 1mg/kg/day (ie for this patient would recommend 30mg IV q12 ); take into consideration wound healing postoperatively and the combination of steroids and paralytics (which cause worse ICU-associated weakness in recovery).

## 2018-03-08 NOTE — CONSULT NOTE ADULT - SUBJECTIVE AND OBJECTIVE BOX
[facility] DIVISION OF KIDNEY DISEASES AND HYPERTENSION -- INITIAL CONSULT NOTE  --------------------------------------------------------------------------------  HPI:  56y/o  Male with h/o esophageal cancer diagnosed around October and has been on chemo-radiation since, last treatment in January 2018. Patient came in for an elective esophagogastroduodenoscopy (EGD) Robotic [place] [facility]ran esophagogastrectomy 2/26/18. Patient had fever on 3/4/18, started on Vancomycin Zosyn.   This AM patient had PEA arrest after aspirating; had CPR for 4 minutes before ROSC. Has had severe respiratory acidosis with poor ventilation since.     PAST HISTORY  --------------------------------------------------------------------------------  PAST MEDICAL & SURGICAL HISTORY:  Esophageal cancer  Weight loss, unintentional  Hodgkins lymphoma: 25 years ago  Acid reflux disease  No significant past surgical history    FAMILY HISTORY:  Family history of prostate cancer in father (Father)    PAST SOCIAL HISTORY:    ALLERGIES & MEDICATIONS  --------------------------------------------------------------------------------  Allergies    No Known Allergies    Intolerances      Standing Inpatient Medications  cefepime  IVPB      cefepime  IVPB 2000 milliGRAM(s) IV Intermittent every 8 hours  chlorhexidine 0.12% Liquid 15 milliLiter(s) Swish and Spit two times a day  cisatracurium Infusion 1 MICROgram(s)/kG/Min IV Continuous <Continuous>  enoxaparin Injectable 40 milliGRAM(s) SubCutaneous daily  norepinephrine Infusion 0.049 MICROgram(s)/kG/Min IV Continuous <Continuous>  pantoprazole  Injectable 40 milliGRAM(s) IV Push daily  propofol Infusion 10 MICROgram(s)/kG/Min IV Continuous <Continuous>  sodium bicarbonate  Infusion 0.074 mEq/kG/Hr IV Continuous <Continuous>  sodium chloride 0.9%. 1000 milliLiter(s) IV Continuous <Continuous>  vancomycin  IVPB 1000 milliGRAM(s) IV Intermittent every 8 hours  vasopressin Infusion 0.05 Unit(s)/Min IV Continuous <Continuous>    PRN Inpatient Medications  ondansetron Injectable 4 milliGRAM(s) IV Push every 6 hours PRN      REVIEW OF SYSTEMS  --------------------------------------------------------------------------------  Unable to obtain  All other systems were reviewed and are negative, except as noted.    VITALS/PHYSICAL EXAM  --------------------------------------------------------------------------------  T(C): 36.5 (03-08-18 @ 18:00), Max: 37.2 (03-08-18 @ 17:00)  HR: 104 (03-08-18 @ 18:00) (80 - 119)  BP: 85/54 (03-08-18 @ 15:00) (81/52 - 155/66)  RR: 48 (03-08-18 @ 18:00) (18 - 55)  SpO2: 91% (03-08-18 @ 18:00) (56% - 100%)  Wt(kg): --        03-07-18 @ 07:01  -  03-08-18 @ 07:00  --------------------------------------------------------  IN: 2270 mL / OUT: 1340 mL / NET: 930 mL    03-08-18 @ 07:01  -  03-08-18 @ 19:05  --------------------------------------------------------  IN: 2481.1 mL / OUT: 235 mL / NET: 2246.1 mL      Physical Exam:  	Gen: int/sed  	HEENT: ETT+  	Pulm: vent BS rate 40 Fi02 100%  	CV: RRR, S1S2; no rub  	Back: No spinal or CVA tenderness; no sacral edema  	Abd: +BS, soft, nontender/nondistended  	: gloria+  	LE: Warm, FROM, no clubbing, intact strength; no edema  	Skin: Warm, without rashes      LABS/STUDIES  --------------------------------------------------------------------------------              9.9    2.9   >-----------<  351      [03-08-18 @ 17:35]              30.7     140  |  98  |  23.0  ----------------------------<  101      [03-08-18 @ 17:35]  5.0   |  33.0  |  1.31        Ca     7.9     [03-08-18 @ 17:35]      Mg     2.7     [03-08-18 @ 17:35]      Phos  1.9     [03-08-18 @ 03:56]    TPro  6.1  /  Alb  2.5  /  TBili  0.2  /  DBili  x   /  AST  39  /  ALT  44  /  AlkPhos  157  [03-08-18 @ 10:33]    PT/INR: PT 14.2 , INR 1.28       [03-08-18 @ 10:33]  PTT: 29.1       [03-08-18 @ 10:33]    Troponin <0.01      [03-08-18 @ 06:07]  CK 31      [03-08-18 @ 06:07]    Creatinine Trend:  SCr 1.31 [03-08 @ 17:35]  SCr 0.81 [03-08 @ 10:33]  SCr 0.60 [03-08 @ 03:56]  SCr 0.71 [03-07 @ 03:28]  SCr 0.73 [03-06 @ 03:29]        HbA1c 5.5      [02-12-18 @ 10:54]

## 2018-03-08 NOTE — PROGRESS NOTE ADULT - PROBLEM SELECTOR PLAN 3
sputum culture with Serratia marcescens and Pseudomonas aeruginosa  Continue Cefepime due to sensitivities

## 2018-03-08 NOTE — CONSULT NOTE ADULT - PROBLEM SELECTOR RECOMMENDATION 4
As above.  ATN from hypoxic injury from cardiac arrest.  Dr Wise consulted.  Would recommend furosemide infusion to start at 10mg/hr.  May require UF at some point.

## 2018-03-08 NOTE — CHART NOTE - NSCHARTNOTEFT_GEN_A_CORE
Bronchoscopy via ETT was performed by Dr. Blanco with assistance by SANAZ Klein s/p aspiration causing PEA arrest.     Per Dr. Blanco, there is no gastric content in the airway and otherwise not intervention need after thorough exploration.

## 2018-03-08 NOTE — PROGRESS NOTE ADULT - PROBLEM SELECTOR PLAN 4
dulcolax [place] PRN  increase ambulation dulcolax [place] PRN  increase ambulation  d/w Dr. Blanco if ok to give additional cathartics down J tube

## 2018-03-08 NOTE — CONSULT NOTE ADULT - SUBJECTIVE AND OBJECTIVE BOX
Patient is a 57y old  Male who presents with a chief complaint of Esophageal Cancer (12 Feb 2018 10:28)      BRIEF HOSPITAL COURSE: 56 yo admitted electively for [place] Mehran esophagectomy for esophageal cancer on 2/26; had an aspiration event this AM and developed PEA arrest with difficulty oxygenating, ventilating, and shock.  Medical critical care service was consulted for severe ARDS management    PAST MEDICAL & SURGICAL HISTORY:  Esophageal cancer  Weight loss, unintentional  Hodgkins lymphoma: 25 years ago  Acid reflux disease  No significant past surgical history    Allergies    No Known Allergies    Intolerances      FAMILY HISTORY:  Family history of prostate cancer in father (Father)      Review of Systems:  unable      Medications:  cefepime  IVPB      cefepime  IVPB 2000 milliGRAM(s) IV Intermittent every 8 hours  vancomycin  IVPB 1000 milliGRAM(s) IV Intermittent every 8 hours    norepinephrine Infusion 0.049 MICROgram(s)/kG/Min IV Continuous <Continuous>      cisatracurium Infusion 1 MICROgram(s)/kG/Min IV Continuous <Continuous>  midazolam Infusion 2 mG/Hr IV Continuous <Continuous>  midazolam Injectable 2 milliGRAM(s) IV Push once  ondansetron Injectable 4 milliGRAM(s) IV Push every 6 hours PRN  propofol Infusion 10 MICROgram(s)/kG/Min IV Continuous <Continuous>      enoxaparin Injectable 40 milliGRAM(s) SubCutaneous daily    pantoprazole  Injectable 40 milliGRAM(s) IV Push daily      vasopressin Infusion 0.05 Unit(s)/Min IV Continuous <Continuous>    sodium bicarbonate  Infusion 0.074 mEq/kG/Hr IV Continuous <Continuous>  sodium chloride 0.9%. 1000 milliLiter(s) IV Continuous <Continuous>      chlorhexidine 0.12% Liquid 15 milliLiter(s) Swish and Spit two times a day        Mode: AC/ CMV (Assist Control/ Continuous Mandatory Ventilation)  RR (machine): 30  TV (machine): 440  FiO2: 100  PEEP: 10  MAP: 22  PIP: 43      ICU Vital Signs Last 24 Hrs  T(C): 36.5 (08 Mar 2018 18:00), Max: 37.2 (08 Mar 2018 17:00)  T(F): 97.7 (08 Mar 2018 18:00), Max: 99 (08 Mar 2018 17:00)  HR: 104 (08 Mar 2018 18:00) (80 - 119)  BP: 85/54 (08 Mar 2018 15:00) (81/52 - 155/66)  BP(mean): 63 (08 Mar 2018 15:00) (62 - 95)  ABP: 102/53 (08 Mar 2018 18:00) (81/49 - 126/54)  ABP(mean): 69 (08 Mar 2018 18:00) (59 - 79)  RR: 48 (08 Mar 2018 18:00) (18 - 55)  SpO2: 91% (08 Mar 2018 18:00) (56% - 100%)    Vital Signs Last 24 Hrs  T(C): 36.5 (08 Mar 2018 18:00), Max: 37.2 (08 Mar 2018 17:00)  T(F): 97.7 (08 Mar 2018 18:00), Max: 99 (08 Mar 2018 17:00)  HR: 104 (08 Mar 2018 18:00) (80 - 119)  BP: 85/54 (08 Mar 2018 15:00) (81/52 - 155/66)  BP(mean): 63 (08 Mar 2018 15:00) (62 - 95)  RR: 48 (08 Mar 2018 18:00) (18 - 55)  SpO2: 91% (08 Mar 2018 18:00) (56% - 100%)    ABG - ( 08 Mar 2018 18:51 )  pH: 7.17  /  pCO2: 94    /  pO2: 72    / HCO3: 28    / Base Excess: 3.6   /  SaO2: 91                  I&O's Detail    07 Mar 2018 07:01  -  08 Mar 2018 07:00  --------------------------------------------------------  IN:    Enteral Tube Flush: 80 mL    Jevity: 1120 mL    Oral Fluid: 720 mL    Solution: 100 mL    Solution: 50 mL    Solution: 200 mL  Total IN: 2270 mL    OUT:    Bulb: 30 mL    Nasoenteral Tube: 60 mL    Voided: 1250 mL  Total OUT: 1340 mL    Total NET: 930 mL      08 Mar 2018 07:01  -  08 Mar 2018 19:13  --------------------------------------------------------  IN:    norepinephrine Infusion: 186 mL    propofol Infusion: 11.1 mL    sodium bicarbonate  Infusion: 400 mL    Sodium Chloride 0.9% IV Bolus: 1500 mL    sodium chloride 0.9%.: 75 mL    Solution: 50 mL    Solution: 250 mL    vasopressin Infusion: 9 mL  Total IN: 2481.1 mL    OUT:    Indwelling Catheter - Urethral: 235 mL  Total OUT: 235 mL    Total NET: 2246.1 mL            LABS:                        9.9    2.9   )-----------( 351      ( 08 Mar 2018 17:35 )             30.7     03-08    140  |  98  |  23.0<H>  ----------------------------<  101  5.0   |  33.0<H>  |  1.31<H>    Ca    7.9<L>      08 Mar 2018 17:35  Phos  1.9     03-08  Mg     2.7     03-08    TPro  6.1<L>  /  Alb  2.5<L>  /  TBili  0.2<L>  /  DBili  x   /  AST  39  /  ALT  44<H>  /  AlkPhos  157<H>  03-08      CARDIAC MARKERS ( 08 Mar 2018 06:07 )  x     / <0.01 ng/mL / 31 U/L / x     / x      CARDIAC MARKERS ( 08 Mar 2018 03:56 )  x     / <0.01 ng/mL / 33 U/L / x     / x          CAPILLARY BLOOD GLUCOSE        PT/INR - ( 08 Mar 2018 10:33 )   PT: 14.2 sec;   INR: 1.28 ratio         PTT - ( 08 Mar 2018 10:33 )  PTT:29.1 sec    CULTURES:  Culture Results:   No growth (03-04 @ 05:44)  Culture Results:   Numerous Serratia marcescens  Numerous Pseudomonas aeruginosa  Numerous Routine respiratory marquis present (03-04 @ 02:47)  Culture Results:   No growth at 48 hours (03-04 @ 01:08)  Culture Results:   No growth at 48 hours (03-04 @ 01:07)      Physical Examination:    General: bitemporal wasting, unresponsive but on paralytic    HEENT: Pupils equal, reactive to light.  Symmetric.    PULM: coarse bilaterally, with respiratory rate set at 40/min; audible tracheal sounds eminating with taut endotracheal tube cuff.  R SHELLI drain in thorax, draining serous fluid    NECK: Supple, no lymphadenopathy, trachea midline    CVS: Regular rate and rhythm, no murmurs, rubs, or gallops    ABD: Soft, nondistended, nontender, normoactive bowel sounds, no masses; PEJ in place    EXT: No edema, nontender    SKIN: Warm and well perfused, no rashes noted.    NEURO: on nimbex infusion    RADIOLOGY: bilateral infilatrates, R>L    CRITICAL CARE TIME SPENT: *** Patient is a 57y old  Male who presents with a chief complaint of Esophageal Cancer (12 Feb 2018 10:28)      BRIEF HOSPITAL COURSE: 56 yo admitted electively for [place] Mehran esophagectomy for esophageal cancer on 2/26; had an aspiration event this AM and developed PEA arrest with difficulty oxygenating, ventilating, and shock.  Medical critical care service was consulted for severe ARDS management    PAST MEDICAL & SURGICAL HISTORY:  Esophageal cancer  Weight loss, unintentional  Hodgkins lymphoma: 25 years ago  Acid reflux disease  No significant past surgical history    Allergies    No Known Allergies    Intolerances      FAMILY HISTORY:  Family history of prostate cancer in father (Father)      Review of Systems:  unable      Medications:  cefepime  IVPB      cefepime  IVPB 2000 milliGRAM(s) IV Intermittent every 8 hours  vancomycin  IVPB 1000 milliGRAM(s) IV Intermittent every 8 hours    norepinephrine Infusion 0.049 MICROgram(s)/kG/Min IV Continuous <Continuous>      cisatracurium Infusion 1 MICROgram(s)/kG/Min IV Continuous <Continuous>  midazolam Infusion 2 mG/Hr IV Continuous <Continuous>  midazolam Injectable 2 milliGRAM(s) IV Push once  ondansetron Injectable 4 milliGRAM(s) IV Push every 6 hours PRN  propofol Infusion 10 MICROgram(s)/kG/Min IV Continuous <Continuous>      enoxaparin Injectable 40 milliGRAM(s) SubCutaneous daily    pantoprazole  Injectable 40 milliGRAM(s) IV Push daily      vasopressin Infusion 0.05 Unit(s)/Min IV Continuous <Continuous>    sodium bicarbonate  Infusion 0.074 mEq/kG/Hr IV Continuous <Continuous>  sodium chloride 0.9%. 1000 milliLiter(s) IV Continuous <Continuous>      chlorhexidine 0.12% Liquid 15 milliLiter(s) Swish and Spit two times a day        Mode: AC/ CMV (Assist Control/ Continuous Mandatory Ventilation)  RR (machine): 30  TV (machine): 440  FiO2: 100  PEEP: 10  MAP: 22  PIP: 43      ICU Vital Signs Last 24 Hrs  T(C): 36.5 (08 Mar 2018 18:00), Max: 37.2 (08 Mar 2018 17:00)  T(F): 97.7 (08 Mar 2018 18:00), Max: 99 (08 Mar 2018 17:00)  HR: 104 (08 Mar 2018 18:00) (80 - 119)  BP: 85/54 (08 Mar 2018 15:00) (81/52 - 155/66)  BP(mean): 63 (08 Mar 2018 15:00) (62 - 95)  ABP: 102/53 (08 Mar 2018 18:00) (81/49 - 126/54)  ABP(mean): 69 (08 Mar 2018 18:00) (59 - 79)  RR: 48 (08 Mar 2018 18:00) (18 - 55)  SpO2: 91% (08 Mar 2018 18:00) (56% - 100%)    Vital Signs Last 24 Hrs  T(C): 36.5 (08 Mar 2018 18:00), Max: 37.2 (08 Mar 2018 17:00)  T(F): 97.7 (08 Mar 2018 18:00), Max: 99 (08 Mar 2018 17:00)  HR: 104 (08 Mar 2018 18:00) (80 - 119)  BP: 85/54 (08 Mar 2018 15:00) (81/52 - 155/66)  BP(mean): 63 (08 Mar 2018 15:00) (62 - 95)  RR: 48 (08 Mar 2018 18:00) (18 - 55)  SpO2: 91% (08 Mar 2018 18:00) (56% - 100%)    ABG - ( 08 Mar 2018 18:51 )  pH: 7.17  /  pCO2: 94    /  pO2: 72    / HCO3: 28    / Base Excess: 3.6   /  SaO2: 91                  I&O's Detail    07 Mar 2018 07:01  -  08 Mar 2018 07:00  --------------------------------------------------------  IN:    Enteral Tube Flush: 80 mL    Jevity: 1120 mL    Oral Fluid: 720 mL    Solution: 100 mL    Solution: 50 mL    Solution: 200 mL  Total IN: 2270 mL    OUT:    Bulb: 30 mL    Nasoenteral Tube: 60 mL    Voided: 1250 mL  Total OUT: 1340 mL    Total NET: 930 mL      08 Mar 2018 07:01  -  08 Mar 2018 19:13  --------------------------------------------------------  IN:    norepinephrine Infusion: 186 mL    propofol Infusion: 11.1 mL    sodium bicarbonate  Infusion: 400 mL    Sodium Chloride 0.9% IV Bolus: 1500 mL    sodium chloride 0.9%.: 75 mL    Solution: 50 mL    Solution: 250 mL    vasopressin Infusion: 9 mL  Total IN: 2481.1 mL    OUT:    Indwelling Catheter - Urethral: 235 mL  Total OUT: 235 mL    Total NET: 2246.1 mL            LABS:                        9.9    2.9   )-----------( 351      ( 08 Mar 2018 17:35 )             30.7     03-08    140  |  98  |  23.0<H>  ----------------------------<  101  5.0   |  33.0<H>  |  1.31<H>    Ca    7.9<L>      08 Mar 2018 17:35  Phos  1.9     03-08  Mg     2.7     03-08    TPro  6.1<L>  /  Alb  2.5<L>  /  TBili  0.2<L>  /  DBili  x   /  AST  39  /  ALT  44<H>  /  AlkPhos  157<H>  03-08      CARDIAC MARKERS ( 08 Mar 2018 06:07 )  x     / <0.01 ng/mL / 31 U/L / x     / x      CARDIAC MARKERS ( 08 Mar 2018 03:56 )  x     / <0.01 ng/mL / 33 U/L / x     / x          CAPILLARY BLOOD GLUCOSE        PT/INR - ( 08 Mar 2018 10:33 )   PT: 14.2 sec;   INR: 1.28 ratio         PTT - ( 08 Mar 2018 10:33 )  PTT:29.1 sec    CULTURES:  Culture Results:   No growth (03-04 @ 05:44)  Culture Results:   Numerous Serratia marcescens  Numerous Pseudomonas aeruginosa  Numerous Routine respiratory marquis present (03-04 @ 02:47)  Culture Results:   No growth at 48 hours (03-04 @ 01:08)  Culture Results:   No growth at 48 hours (03-04 @ 01:07)      Physical Examination:    General: bitemporal wasting, unresponsive but on paralytic    HEENT: Pupils equal, reactive to light.  Symmetric.    PULM: coarse bilaterally, with respiratory rate set at 40/min; audible tracheal sounds eminating with taut endotracheal tube cuff.  R SHELLI drain in thorax, draining serous fluid    NECK: Supple, no lymphadenopathy, trachea midline    CVS: Regular rate and rhythm, no murmurs, rubs, or gallops    ABD: Soft, nondistended, nontender, normoactive bowel sounds, no masses; PEJ in place    EXT: No edema, nontender    SKIN: Warm and well perfused, no rashes noted.    NEURO: on nimbex infusion    RADIOLOGY: bilateral infilatrates, R>L    CRITICAL CARE TIME SPENT: 85 min

## 2018-03-08 NOTE — CHART NOTE - NSCHARTNOTEFT_GEN_A_CORE
SANAZ Klein performing bedside rounds, noted patient Dixon to be having active nausea/vomiting, called for Zofran however vomiting ensued. Patient noted to be having respiratory retractions and SANAZ Klein performing bedside rounds, noted patient Zack to be having active nausea/vomiting, called for Zofran, vomiting ensued. Patient noted to be having respiratory retractions and then shortness of breath. CXR ordered, respiratory therapist called. Patient progressively SOB, PEA arrest, compressions started. Received 0.5 ml of Epinephrine, HR 50s sinus bradycardia, BP returned (SBP > 200). Difficult airway, intubated by anesthesia, 8.0 ETT placed. Attending surgeon Dr. Barron at the bedside.     Bedside bronchoscopy performed, unable to visualize airways well. SANAZ Klein performing bedside rounds, noted patient Zack to be having active nausea/vomiting, called for Zofran, vomiting ensued. Patient noted to be having respiratory retractions and then shortness of breath. CXR ordered, respiratory therapist called. Patient progressively SOB, PEA arrest, compressions started. Received 0.5 ml of Epinephrine, HR 50s sinus bradycardia, BP returned (SBP > 200). ROSC. Difficult airway, intubated by anesthesia, 8.0 ETT placed. Attending surgeon Dr. Barron at the bedside.     Bedside bronchoscopy performed by surgeon, unable to visualize airways well.   ABG obtained and treated, patient connected to mechanical ventilation.    Postintubation XRay reviewed, ETT slightly high in comparison to kate, adjusted CXR repeated, ETT in proper position.

## 2018-03-08 NOTE — CONSULT NOTE ADULT - PROBLEM SELECTOR RECOMMENDATION 3
Continue norepinephrine infusion and vasopressin infusions to optimize perfusion.
s/p robotic [place] [facility]ran Esophagogastrectomy and jejunostomy tube placement 2/6/18  s/p Chemo-radiation

## 2018-03-08 NOTE — PROGRESS NOTE ADULT - SUBJECTIVE AND OBJECTIVE BOX
POD 10 s/p robotic melody marlon esophagectomy, postop diffuse abdominal pain requiring CT scan> no obstruction, improved with dilaudid PCA, started on diet 3/7, NGT out, minimal drainage from R chest SHELLI, tolerating SHELLI tube feeds at goal     T(C): 36.6 (03-07-18 @ 22:00), Max: 36.8 (03-07-18 @ 03:00)  HR: 106 (03-08-18 @ 01:00) (95 - 118)  BP: 105/59 (03-08-18 @ 01:00) (95/50 - 126/72)  RR: 22 (03-08-18 @ 01:00) (15 - 36)  SpO2: 100% (03-08-18 @ 01:00) (74% - 100%)     03-07    135  |  96<L>  |  15.0  ----------------------------<  127<H>  4.1   |  29.0  |  0.71    Ca    8.1<L>      07 Mar 2018 03:28  Phos  3.2     03-07  Mg     1.9     03-07                                 11.2   12.2  )-----------( 347      ( 07 Mar 2018 03:28 )             33.4       I&O's Detail    06 Mar 2018 07:01  -  07 Mar 2018 07:00  --------------------------------------------------------  IN:    Enteral Tube Flush: 336 mL    Jevity: 210 mL    lactated ringers.: 300 mL    lactated ringers.: 450 mL    Solution: 100 mL    Solution: 50 mL    Solution: 50 mL  Total IN: 1496 mL    OUT:    Bulb: 15 mL    Nasoenteral Tube: 300 mL    Voided: 900 mL  Total OUT: 1215 mL    Total NET: 281 mL      07 Mar 2018 07:01  -  08 Mar 2018 01:38  --------------------------------------------------------  IN:    Enteral Tube Flush: 40 mL    Jevity: 820 mL    Oral Fluid: 240 mL    Solution: 150 mL  Total IN: 1250 mL    OUT:    Bulb: 5 mL    Nasoenteral Tube: 60 mL    Voided: 850 mL  Total OUT: 915 mL    Total NET: 335 mL    Drug Dosing Weight  Height (cm): 180.34 (26 Feb 2018 05:46)  Weight (kg): 54 (26 Feb 2018 05:46)  BMI (kg/m2): 16.6 (26 Feb 2018 05:46)  BSA (m2): 1.69 (26 Feb 2018 05:46)    MEDICATIONS  (STANDING):  cefepime  IVPB      cefepime  IVPB 2000 milliGRAM(s) IV Intermittent every 8 hours  enoxaparin Injectable 40 milliGRAM(s) SubCutaneous daily  HYDROmorphone PCA (1 mG/mL) 30 milliLiter(s) PCA Continuous PCA Continuous  pantoprazole  Injectable 40 milliGRAM(s) IV Push daily    MEDICATIONS  (PRN):  HYDROmorphone  Injectable 0.5 milliGRAM(s) IV Push every 3 hours PRN Severe Pain (7 - 10)  naloxone Injectable 0.1 milliGRAM(s) IV Push every 3 minutes PRN For ANY of the following changes in patient status:  A. RR LESS THAN 10 breaths per minute, B. Oxygen saturation LESS THAN 90%, C. Sedation score of 6  ondansetron Injectable 4 milliGRAM(s) IV Push every 6 hours PRN Nausea    Physical Exam  Neuro: A&OX3  Pulm:   CV:   Abd:   Extrem/MS:   Incision(s): POD 10 s/p robotic melody marlon esophagectomy, postop diffuse abdominal pain requiring CT scan> no obstruction, improved with dilaudid PCA, started on diet 3/7, NGT out, minimal drainage from R chest SHELLI, tolerating SHELLI tube feeds at goal   still c/o intermittent abdominal pain and SOB, denies CP, palpitations, HA, dizziness, numbness, tingling, fever, chills    T(C): 36.6 (03-07-18 @ 22:00), Max: 36.8 (03-07-18 @ 03:00)  HR: 106 (03-08-18 @ 01:00) (95 - 118)  BP: 105/59 (03-08-18 @ 01:00) (95/50 - 126/72)  RR: 22 (03-08-18 @ 01:00) (15 - 36)  SpO2: 100% (03-08-18 @ 01:00) (74% - 100%)     03-07    135  |  96<L>  |  15.0  ----------------------------<  127<H>  4.1   |  29.0  |  0.71    Ca    8.1<L>      07 Mar 2018 03:28  Phos  3.2     03-07  Mg     1.9     03-07                                 11.2   12.2  )-----------( 347      ( 07 Mar 2018 03:28 )             33.4       I&O's Detail    06 Mar 2018 07:01  -  07 Mar 2018 07:00  --------------------------------------------------------  IN:    Enteral Tube Flush: 336 mL    Jevity: 210 mL    lactated ringers.: 300 mL    lactated ringers.: 450 mL    Solution: 100 mL    Solution: 50 mL    Solution: 50 mL  Total IN: 1496 mL    OUT:    Bulb: 15 mL    Nasoenteral Tube: 300 mL    Voided: 900 mL  Total OUT: 1215 mL    Total NET: 281 mL      07 Mar 2018 07:01  -  08 Mar 2018 01:38  --------------------------------------------------------  IN:    Enteral Tube Flush: 40 mL    Jevity: 820 mL    Oral Fluid: 240 mL    Solution: 150 mL  Total IN: 1250 mL    OUT:    Bulb: 5 mL    Nasoenteral Tube: 60 mL    Voided: 850 mL  Total OUT: 915 mL    Total NET: 335 mL    Drug Dosing Weight  Height (cm): 180.34 (26 Feb 2018 05:46)  Weight (kg): 54 (26 Feb 2018 05:46)  BMI (kg/m2): 16.6 (26 Feb 2018 05:46)  BSA (m2): 1.69 (26 Feb 2018 05:46)    MEDICATIONS  (STANDING):  cefepime  IVPB      cefepime  IVPB 2000 milliGRAM(s) IV Intermittent every 8 hours  enoxaparin Injectable 40 milliGRAM(s) SubCutaneous daily  HYDROmorphone PCA (1 mG/mL) 30 milliLiter(s) PCA Continuous PCA Continuous  pantoprazole  Injectable 40 milliGRAM(s) IV Push daily    MEDICATIONS  (PRN):  HYDROmorphone  Injectable 0.5 milliGRAM(s) IV Push every 3 hours PRN Severe Pain (7 - 10)  naloxone Injectable 0.1 milliGRAM(s) IV Push every 3 minutes PRN For ANY of the following changes in patient status:  A. RR LESS THAN 10 breaths per minute, B. Oxygen saturation LESS THAN 90%, C. Sedation score of 6  ondansetron Injectable 4 milliGRAM(s) IV Push every 6 hours PRN Nausea    Physical Exam  Neuro: A&OX3  Pulm: decreased BS b/l  CV: S1S2 regular, tachy  Abd: hypoactive BS, soft, + distention, mild tenderness to lower quadrants, + J tube  Extrem/MS: no edema, WWP  Incision(s): multiple robotic sites of lower abd + steristrips, C/D/I, no erythema,  R chest dsg C/D/I

## 2018-03-08 NOTE — PROGRESS NOTE ADULT - SUBJECTIVE AND OBJECTIVE BOX
INTERVAL HPI/OVERNIGHT EVENTS/SUBJECTIVE:  Pt seen and examined at bedside.   This AM, pt had episode of vomiting with aspiration, s/p PEA arrest, ROSC, bronchoscopy performed at bedside. Currently intubated on propofol, NGT, on pressors (levo), and bicarb drip for severe resp acidosis.       ICU Vital Signs Last 24 Hrs  T(C): 36.9 (08 Mar 2018 14:00), Max: 36.9 (08 Mar 2018 14:00)  T(F): 98.4 (08 Mar 2018 14:00), Max: 98.4 (08 Mar 2018 14:00)  HR: 112 (08 Mar 2018 14:00) (80 - 119)  BP: 99/57 (08 Mar 2018 14:00) (81/52 - 155/66)  BP(mean): 71 (08 Mar 2018 14:00) (62 - 95)  ABP: 117/56 (08 Mar 2018 14:00) (98/48 - 126/54)  ABP(mean): 71 (08 Mar 2018 14:00) (59 - 71)  RR: 40 (08 Mar 2018 14:00) (18 - 55)  SpO2: 95% (08 Mar 2018 14:00) (56% - 100%)      I&O's Detail    07 Mar 2018 07:01  -  08 Mar 2018 07:00  --------------------------------------------------------  IN:    Enteral Tube Flush: 80 mL    Jevity: 1120 mL    Oral Fluid: 720 mL    Solution: 100 mL    Solution: 50 mL    Solution: 200 mL  Total IN: 2270 mL    OUT:    Bulb: 30 mL    Nasoenteral Tube: 60 mL    Voided: 1250 mL  Total OUT: 1340 mL    Total NET: 930 mL      08 Mar 2018 07:01  -  08 Mar 2018 15:54  --------------------------------------------------------  IN:    norepinephrine Infusion: 58 mL    propofol Infusion: 9.6 mL    sodium bicarbonate  Infusion: 80 mL    Solution: 250 mL  Total IN: 397.6 mL    OUT:    Indwelling Catheter - Urethral: 175 mL  Total OUT: 175 mL    Total NET: 222.6 mL      Mode: AC/ CMV (Assist Control/ Continuous Mandatory Ventilation)  RR (machine): 40  TV (machine): 550  FiO2: 100  PEEP: 8  MAP: 21  PIP: 33    ABG - ( 08 Mar 2018 15:25 )  pH: 7.14  /  pCO2: 104   /  pO2: 68    / HCO3: 28    / Base Excess: 4.0   /  SaO2: 88        MEDICATIONS  (STANDING):  cefepime  IVPB      cefepime  IVPB 2000 milliGRAM(s) IV Intermittent every 8 hours  chlorhexidine 0.12% Liquid 15 milliLiter(s) Swish and Spit two times a day  enoxaparin Injectable 40 milliGRAM(s) SubCutaneous daily  norepinephrine Infusion 0.049 MICROgram(s)/kG/Min (5 mL/Hr) IV Continuous <Continuous>  pantoprazole  Injectable 40 milliGRAM(s) IV Push daily  propofol Infusion 10 MICROgram(s)/kG/Min (3.24 mL/Hr) IV Continuous <Continuous>  sodium bicarbonate  Infusion 0.074 mEq/kG/Hr (80 mL/Hr) IV Continuous <Continuous>  sodium chloride 0.9% Bolus 500 milliLiter(s) IV Bolus once  sodium chloride 0.9% Bolus 500 milliLiter(s) IV Bolus once  vancomycin  IVPB 1000 milliGRAM(s) IV Intermittent every 8 hours    MEDICATIONS  (PRN):  ondansetron Injectable 4 milliGRAM(s) IV Push every 6 hours PRN Nausea    MISC:     PHYSICAL EXAM:  Constitutional: intubated, not following commands, no response to painful stimuli  Neck: No JVD  HENT: NGT in place  Respiratory: intubated, non-labored  Cardiovascular: S1, S2  Gastrointestinal: Softly distended, non-tender, + tympany, no guarding or rebound. Port sites c/d/i. SHELLI in place with SS output as recorded. J tube in place.   Extremities: No peripheral edema, No cyanosis  Neurological: poor neuro exam, no response to painful stimuli, not following commands.       LABS:  CBC Full  -  ( 08 Mar 2018 10:33 )  WBC Count : 9.1 K/uL  Hemoglobin : 10.3 g/dL  Hematocrit : 30.7 %  Platelet Count - Automated : 423 K/uL  Mean Cell Volume : 88.0 fl  Mean Cell Hemoglobin : 29.5 pg  Mean Cell Hemoglobin Concentration : 33.6 g/dL  Auto Neutrophil # : x  Auto Lymphocyte # : x  Auto Monocyte # : x  Auto Eosinophil # : x  Auto Basophil # : x  Auto Neutrophil % : x  Auto Lymphocyte % : x  Auto Monocyte % : x  Auto Eosinophil % : x  Auto Basophil % : x    03-08    140  |  98  |  17.0  ----------------------------<  154<H>  4.2   |  34.0<H>  |  0.81    Ca    8.5<L>      08 Mar 2018 10:33  Phos  1.9     03-08  Mg     2.7     03-08    TPro  6.1<L>  /  Alb  2.5<L>  /  TBili  0.2<L>  /  DBili  x   /  AST  39  /  ALT  44<H>  /  AlkPhos  157<H>  03-08    PT/INR - ( 08 Mar 2018 10:33 )   PT: 14.2 sec;   INR: 1.28 ratio         PTT - ( 08 Mar 2018 10:33 )  PTT:29.1 sec    RECENT CULTURES:  03-04 .Urine Clean Catch (Midstream) XXXX XXXX   No growth    03-04 .Sputum Sputum Serratia marcescens  Pseudomonas aeruginosa   Few White blood cells  Numerous Gram Negative Rods  Moderate Gram Positive Cocci in Pairs and Chains   Numerous Serratia marcescens  Numerous Pseudomonas aeruginosa  Numerous Routine respiratory marquis present    03-04 .Blood Blood-Peripheral XXXX XXXX   No growth at 48 hours    03-04 .Blood Blood-Peripheral XXXX XXXX   No growth at 48 hours        LIVER FUNCTIONS - ( 08 Mar 2018 10:33 )  Alb: 2.5 g/dL / Pro: 6.1 g/dL / ALK PHOS: 157 U/L / ALT: 44 U/L / AST: 39 U/L / GGT: x           CARDIAC MARKERS ( 08 Mar 2018 06:07 )  x     / <0.01 ng/mL / 31 U/L / x     / x      CARDIAC MARKERS ( 08 Mar 2018 03:56 )  x     / <0.01 ng/mL / 33 U/L / x     / x          ASSESSMENT/PLAN:  57yMale POD 10 robotic assisted esophagectomy. s/p PEA arrest with ROSC, now on vent with levo, bicarb drip,  -discussed with CT ICU PA and Dr. Blanco. Dr. Olson aware  -optimize respiratory status, care per CT ICU   -strict I/O's  -NPO  -dvt ppx  -care per CT ICU surgery

## 2018-03-08 NOTE — CONSULT NOTE ADULT - ASSESSMENT
1) ATN  2) Oligo-anuria  3) Respiratory Acidosis  4) Hypotension    Patient is s/p PEA arrest with 4 minute CPR  Has ATN from poor perfusion; suspect SCr will continue uptrending  Profound respiratory acidosis; pt vent up to 40 RR with Fi02 of 100%  Urine output dropping; nearing anuria; potassium starting to rise  Will follow with CTICU repeat labs at midnight  Orders for CVVHDF placed if will need to start overnight  Please call me with updates [phone]  d/w KOKO YO and Dr. Barron

## 2018-03-08 NOTE — PROGRESS NOTE ADULT - PROBLEM SELECTOR PLAN 1
admitted to CTICU 2/26  NGT clamped  Do not manipulate, d/c or use NGT for feeds/meds  strict NPO   HOB >45 degrees for aspiration precaution.  modified barium swallow - Nectar thick liquids only once cleared by attending  CTA c/a/p    as per dr. cloud no leak s/p esophagectomy 2/26  NGT d/c’d 3/7  HOB >45 degrees for aspiration precaution.  CTA c/a/p    as per dr. blanco no leak  OOB/ambulate  Dilaudid PCA for pain control, pain following d/w team about transitioning off  Cleared for nector thick liquids, cont swallow therapy  Maintain R chest SHELLI until Dr. Blanco advises to remove

## 2018-03-08 NOTE — PROGRESS NOTE ADULT - ASSESSMENT
57M with esophageal cancer, Hodgkin's lymphoma, GERD. 2/26 s/p robotic [place] Mehran Esophagogastrectomy and jejunostomy tube placement. Abdominal pain, constipation, presumed PNA, now on antibiotics, cleared for diet 3/7, NGT d/c'd,

## 2018-03-08 NOTE — PROGRESS NOTE ADULT - ASSESSMENT
56 y/o M with esophageal Ca s/p robotic [place] Mehran Esophagogastrectomy and jejunostomy tube placement 2/6/18, developed fever post op 3/4/18, sputum culture with Serratia marcescens and Pseudomonas aeruginosa

## 2018-03-08 NOTE — PROGRESS NOTE ADULT - SUBJECTIVE AND OBJECTIVE BOX
[facility]  INFECTIOUS DISEASES AND INTERNAL MEDICINE at [place]  =======================================================  Jere Spear MD  Diplomates American Board of Internal Medicine and Infectious Diseases  =======================================================    ROB [facility] [id number]    Follow up:  Pneumonia    Had Respiratory arrest this morning due aspiration per CTICU   was intubated placed on vent  Had bedside bronch with removal of about 500cc of tube feeds       Allergies:  No Known Allergies      Antibiotics:   cefepime  IVPB 2000 milliGRAM(s) IV Intermittent every 8 hours  vancomycin  IVPB 1000 milliGRAM(s) IV Intermittent every 12 hours       REVIEW OF SYSTEMS:  Unable to obtain, patient is intubated on vent      Physical Exam:  Vital Signs Last 24 Hrs  T(C): 36.8 (07 Mar 2018 03:00), Max: 37.6 (06 Mar 2018 12:00)  T(F): 98.2 (07 Mar 2018 03:00), Max: 99.6 (06 Mar 2018 12:00)  HR: 98 (07 Mar 2018 09:00) (76 - 114)  BP: 95/50 (07 Mar 2018 09:00) (95/50 - 156/82)  BP(mean): 68 (07 Mar 2018 09:00) (68 - 111)  RR: 17 (07 Mar 2018 09:00) (12 - 36)  SpO2: 100% (07 Mar 2018 09:00) (74% - 100%)      GEN: sedated on vent  HEENT: normocephalic and atraumatic. Sluggish pupils.  +NGT + ETT  NECK: Supple.  LUNGS: coarse BS B/L  HEART: Regular rate and rhythm  ABDOMEN: Soft, nontender, and nondistended.  Positive bowel sounds.    : + Garcia, + femoral A line on left and TLC on rt   EXTREMITIES: Without any edema.  MSK: No joint swelling  NEUROLOGIC: Sedated  PSYCHIATRIC: Unable to obtain, patient sedated   SKIN: No rash      Labs:  03-08    138  |  100  |  13.0  ----------------------------<  143<H>  4.3   |  29.0  |  0.60    Ca    8.3<L>      08 Mar 2018 03:56  Phos  1.9     03-08  Mg     2.2     03-08               10.2   15.3  )-----------( 406      ( 08 Mar 2018 03:56 )             30.1     CARDIAC MARKERS ( 08 Mar 2018 06:07 )  x     / <0.01 ng/mL / 31 U/L / x     / x      CARDIAC MARKERS ( 08 Mar 2018 03:56 )  x     / <0.01 ng/mL / 33 U/L / x     / x          ABG - ( 08 Mar 2018 08:40 )  pH: 6.88  /  pCO2: *     /  pO2: 84    / HCO3: 16    / Base Excess: -9.6  /  SaO2: 83          RECENT CULTURES:  03-04 @ 05:44 .Urine Clean Catch (Midstream)     No growth      03-04 @ 02:47 .Sputum Sputum Serratia marcescens  Pseudomonas aeruginosa    Numerous Serratia marcescens  Numerous Pseudomonas aeruginosa  Numerous Routine respiratory marquis present  Few White blood cells  Numerous Gram Negative Rods  Moderate Gram Positive Cocci in Pairs and Chains      03-04 @ 01:08 .Blood Blood-Peripheral     No growth at 48 hours      03-04 @ 01:07 .Blood Blood-Peripheral     No growth at 48 hours      EXAM:  CT CHEST                        PROCEDURE DATE:  03/06/2018    INTERPRETATION:  CLINICAL INFORMATION: Status post esophagectomy and   gastric pull-through, assess for anastomotic leak  COMPARISON: 3/3/2016   PROCEDURE:   CT of the Chest was performed without intravenous contrast.  Sagittal and coronal reformats were performed.  FINDINGS:  CHEST:   LUNGS AND LARGE AIRWAYS: Patent central airways.  Right apical scarring.   Extensive emphysema. Right basilar atelectasis.  PLEURA: Chest tube at the right apex. Small right pleural effusion and   intrapleural air is stable. Small left pleural effusion.   VESSELS: Within normal limits.  HEART: Heart size is normal. No pericardial effusion.  MEDIASTINUM AND NAS: Pneumomediastinum and posterior mediastinal air   fluid collection improved. There is reflux of oral contrast into the   esophagus. Gastroesophageal anastomosis and intrathoracic stomach are   contrast opacified. Again redemonstrated is a 1.7 x 1.1 cm extraluminal   collection of air and contrast medial to the anastomotic sutures. There   is no free contrast extravasation in the pleural cavity. The NG tube is   in the intrathoracic stomach.  CHEST WALL AND LOWER NECK: Within normal limits.  VISUALIZED UPPER ABDOMEN: Decreased pneumoperitoneum  BONES: Within normal limits.  IMPRESSION:   Persistent small contained leak medial to the anastomosis.   Decreased pneumomediastinum and posterior mediastinal fluid. Small stable   pleural effusions.

## 2018-03-09 DIAGNOSIS — R57.0 CARDIOGENIC SHOCK: ICD-10-CM

## 2018-03-09 DIAGNOSIS — J69.0 PNEUMONITIS DUE TO INHALATION OF FOOD AND VOMIT: ICD-10-CM

## 2018-03-09 DIAGNOSIS — I46.9 CARDIAC ARREST, CAUSE UNSPECIFIED: ICD-10-CM

## 2018-03-09 DIAGNOSIS — K21.9 GASTRO-ESOPHAGEAL REFLUX DISEASE WITHOUT ESOPHAGITIS: ICD-10-CM

## 2018-03-09 DIAGNOSIS — J96.01 ACUTE RESPIRATORY FAILURE WITH HYPOXIA: ICD-10-CM

## 2018-03-09 LAB
ANION GAP SERPL CALC-SCNC: 7 MMOL/L — SIGNIFICANT CHANGE UP (ref 5–17)
ANION GAP SERPL CALC-SCNC: 9 MMOL/L — SIGNIFICANT CHANGE UP (ref 5–17)
BUN SERPL-MCNC: 25 MG/DL — HIGH (ref 8–20)
BUN SERPL-MCNC: 26 MG/DL — HIGH (ref 8–20)
CALCIUM SERPL-MCNC: 7.8 MG/DL — LOW (ref 8.6–10.2)
CALCIUM SERPL-MCNC: 7.8 MG/DL — LOW (ref 8.6–10.2)
CHLORIDE SERPL-SCNC: 94 MMOL/L — LOW (ref 98–107)
CHLORIDE SERPL-SCNC: 97 MMOL/L — LOW (ref 98–107)
CO2 SERPL-SCNC: 33 MMOL/L — HIGH (ref 22–29)
CO2 SERPL-SCNC: 33 MMOL/L — HIGH (ref 22–29)
CREAT SERPL-MCNC: 1.35 MG/DL — HIGH (ref 0.5–1.3)
CREAT SERPL-MCNC: 1.38 MG/DL — HIGH (ref 0.5–1.3)
CULTURE RESULTS: SIGNIFICANT CHANGE UP
CULTURE RESULTS: SIGNIFICANT CHANGE UP
GAS PNL BLDA: SIGNIFICANT CHANGE UP
GLUCOSE SERPL-MCNC: 167 MG/DL — HIGH (ref 70–115)
GLUCOSE SERPL-MCNC: 208 MG/DL — HIGH (ref 70–115)
HCT VFR BLD CALC: 29.2 % — LOW (ref 42–52)
HCT VFR BLD CALC: 29.4 % — LOW (ref 42–52)
HCT VFR BLD CALC: 30.7 % — LOW (ref 42–52)
HGB BLD-MCNC: 9.2 G/DL — LOW (ref 14–18)
HGB BLD-MCNC: 9.4 G/DL — LOW (ref 14–18)
HGB BLD-MCNC: 9.6 G/DL — LOW (ref 14–18)
MAGNESIUM SERPL-MCNC: 2.4 MG/DL — SIGNIFICANT CHANGE UP (ref 1.6–2.6)
MAGNESIUM SERPL-MCNC: 2.5 MG/DL — SIGNIFICANT CHANGE UP (ref 1.6–2.6)
MCHC RBC-ENTMCNC: 28.8 PG — SIGNIFICANT CHANGE UP (ref 27–31)
MCHC RBC-ENTMCNC: 29 PG — SIGNIFICANT CHANGE UP (ref 27–31)
MCHC RBC-ENTMCNC: 29.2 PG — SIGNIFICANT CHANGE UP (ref 27–31)
MCHC RBC-ENTMCNC: 31.3 G/DL — LOW (ref 32–36)
MCHC RBC-ENTMCNC: 31.5 G/DL — LOW (ref 32–36)
MCHC RBC-ENTMCNC: 32 G/DL — SIGNIFICANT CHANGE UP (ref 32–36)
MCV RBC AUTO: 91.3 FL — SIGNIFICANT CHANGE UP (ref 80–94)
MCV RBC AUTO: 92.1 FL — SIGNIFICANT CHANGE UP (ref 80–94)
MCV RBC AUTO: 92.2 FL — SIGNIFICANT CHANGE UP (ref 80–94)
PLATELET # BLD AUTO: 324 K/UL — SIGNIFICANT CHANGE UP (ref 150–400)
PLATELET # BLD AUTO: 342 K/UL — SIGNIFICANT CHANGE UP (ref 150–400)
PLATELET # BLD AUTO: 352 K/UL — SIGNIFICANT CHANGE UP (ref 150–400)
POTASSIUM SERPL-MCNC: 5.2 MMOL/L — SIGNIFICANT CHANGE UP (ref 3.5–5.3)
POTASSIUM SERPL-MCNC: 5.4 MMOL/L — HIGH (ref 3.5–5.3)
POTASSIUM SERPL-SCNC: 5.2 MMOL/L — SIGNIFICANT CHANGE UP (ref 3.5–5.3)
POTASSIUM SERPL-SCNC: 5.4 MMOL/L — HIGH (ref 3.5–5.3)
RBC # BLD: 3.17 M/UL — LOW (ref 4.6–6.2)
RBC # BLD: 3.22 M/UL — LOW (ref 4.6–6.2)
RBC # BLD: 3.33 M/UL — LOW (ref 4.6–6.2)
RBC # FLD: 15.5 % — SIGNIFICANT CHANGE UP (ref 11–15.6)
SODIUM SERPL-SCNC: 134 MMOL/L — LOW (ref 135–145)
SODIUM SERPL-SCNC: 139 MMOL/L — SIGNIFICANT CHANGE UP (ref 135–145)
SPECIMEN SOURCE: SIGNIFICANT CHANGE UP
SPECIMEN SOURCE: SIGNIFICANT CHANGE UP
WBC # BLD: 13 K/UL — HIGH (ref 4.8–10.8)
WBC # BLD: 17.7 K/UL — HIGH (ref 4.8–10.8)
WBC # BLD: 17.8 K/UL — HIGH (ref 4.8–10.8)
WBC # FLD AUTO: 13 K/UL — HIGH (ref 4.8–10.8)
WBC # FLD AUTO: 17.7 K/UL — HIGH (ref 4.8–10.8)
WBC # FLD AUTO: 17.8 K/UL — HIGH (ref 4.8–10.8)

## 2018-03-09 PROCEDURE — 99233 SBSQ HOSP IP/OBS HIGH 50: CPT

## 2018-03-09 PROCEDURE — 71045 X-RAY EXAM CHEST 1 VIEW: CPT | Mod: 26,77

## 2018-03-09 PROCEDURE — 99291 CRITICAL CARE FIRST HOUR: CPT

## 2018-03-09 PROCEDURE — 71045 X-RAY EXAM CHEST 1 VIEW: CPT | Mod: 26,76

## 2018-03-09 PROCEDURE — 36556 INSERT NON-TUNNEL CV CATH: CPT

## 2018-03-09 PROCEDURE — 74019 RADEX ABDOMEN 2 VIEWS: CPT | Mod: 26

## 2018-03-09 RX ADMIN — CHLORHEXIDINE GLUCONATE 15 MILLILITER(S): 213 SOLUTION TOPICAL at 05:27

## 2018-03-09 RX ADMIN — CEFEPIME 100 MILLIGRAM(S): 1 INJECTION, POWDER, FOR SOLUTION INTRAMUSCULAR; INTRAVENOUS at 05:42

## 2018-03-09 RX ADMIN — ENOXAPARIN SODIUM 40 MILLIGRAM(S): 100 INJECTION SUBCUTANEOUS at 13:36

## 2018-03-09 RX ADMIN — Medication 80 MEQ/KG/HR: at 01:52

## 2018-03-09 RX ADMIN — Medication 250 MILLIGRAM(S): at 13:36

## 2018-03-09 RX ADMIN — CEFEPIME 100 MILLIGRAM(S): 1 INJECTION, POWDER, FOR SOLUTION INTRAMUSCULAR; INTRAVENOUS at 14:18

## 2018-03-09 RX ADMIN — Medication 30 MILLIGRAM(S): at 05:41

## 2018-03-09 RX ADMIN — Medication 250 MILLIGRAM(S): at 04:07

## 2018-03-09 RX ADMIN — CHLORHEXIDINE GLUCONATE 15 MILLILITER(S): 213 SOLUTION TOPICAL at 19:18

## 2018-03-09 RX ADMIN — CEFEPIME 100 MILLIGRAM(S): 1 INJECTION, POWDER, FOR SOLUTION INTRAMUSCULAR; INTRAVENOUS at 22:21

## 2018-03-09 RX ADMIN — Medication 1 DROP(S): at 10:05

## 2018-03-09 RX ADMIN — PANTOPRAZOLE SODIUM 40 MILLIGRAM(S): 20 TABLET, DELAYED RELEASE ORAL at 13:36

## 2018-03-09 RX ADMIN — Medication 1 DROP(S): at 15:34

## 2018-03-09 RX ADMIN — Medication 250 MILLIGRAM(S): at 21:19

## 2018-03-09 RX ADMIN — Medication 30 MILLIGRAM(S): at 19:20

## 2018-03-09 RX ADMIN — Medication 30 MILLIGRAM(S): at 00:19

## 2018-03-09 NOTE — PROGRESS NOTE ADULT - PROBLEM SELECTOR PLAN 2
sputum culture with Serratia marcescens and Pseudomonas aeruginosa  Continue Cefepime due to sensitivities  Blood cultures no growth  Afebrile  Trend Leukocytosis  Can obtain tracheal aspirate if has increased secretions   Continue Vancomycin

## 2018-03-09 NOTE — PROGRESS NOTE ADULT - SUBJECTIVE AND OBJECTIVE BOX
INTERVAL HPI/OVERNIGHT EVENTS/SUBJECTIVE:  Pt seen and examined at bedside. Worsening resp acidosis, on pressors, bicarb drip. started lasix drip for worsening RENETTA.       ICU Vital Signs Last 24 Hrs  T(C): 35.6 (09 Mar 2018 12:00), Max: 37.2 (08 Mar 2018 17:00)  T(F): 96.1 (09 Mar 2018 12:00), Max: 99 (08 Mar 2018 17:00)  HR: 81 (09 Mar 2018 13:00) (75 - 112)  BP: 85/54 (08 Mar 2018 15:00) (85/54 - 99/57)  BP(mean): 63 (08 Mar 2018 15:00) (63 - 71)  ABP: 120/56 (09 Mar 2018 13:00) (81/49 - 137/68)  ABP(mean): 74 (09 Mar 2018 13:00) (61 - 89)  RR: 30 (09 Mar 2018 13:00) (30 - 60)  SpO2: 99% (09 Mar 2018 13:00) (76% - 100%)      I&O's Detail    08 Mar 2018 07:01  -  09 Mar 2018 07:00  --------------------------------------------------------  IN:    cisatracurium Infusion: 116.3 mL    furosemide Infusion: 50 mL    midazolam Infusion: 22 mL    norepinephrine Infusion: 391.5 mL    propofol Infusion: 31.7 mL    sodium bicarbonate  Infusion: 1440 mL    sodium chloride 0.9%: 150 mL    Sodium Chloride 0.9% IV Bolus: 1500 mL    Solution: 750 mL    Solution: 250 mL    vasopressin Infusion: 27.6 mL  Total IN: 4729.1 mL    OUT:    Bulb: 30 mL    Indwelling Catheter - Urethral: 1990 mL    Nasoenteral Tube: 400 mL  Total OUT: 2420 mL    Total NET: 2309.1 mL      09 Mar 2018 07:01  -  09 Mar 2018 13:47  --------------------------------------------------------  IN:    cisatracurium Infusion: 58.2 mL    furosemide Infusion: 30 mL    midazolam Infusion: 12 mL    norepinephrine Infusion: 3 mL    propofol Infusion: 9.6 mL    sodium bicarbonate  Infusion: 160 mL  Total IN: 272.8 mL    OUT:    Bulb: 30 mL    Indwelling Catheter - Urethral: 395 mL  Total OUT: 425 mL    Total NET: -152.2 mL      Mode: AC/ CMV (Assist Control/ Continuous Mandatory Ventilation)  RR (machine): 30  TV (machine): 450  FiO2: 100  PEEP: 10  MAP: 19  PIP: 38    ABG - ( 09 Mar 2018 13:10 )  pH: 7.12  /  pCO2: x     /  pO2: 109   / HCO3: 29    / Base Excess: 4.7   /  SaO2: 98          MEDICATIONS  (STANDING):  cefepime  IVPB      cefepime  IVPB 2000 milliGRAM(s) IV Intermittent every 8 hours  chlorhexidine 0.12% Liquid 15 milliLiter(s) Swish and Spit two times a day  cisatracurium Infusion 1 MICROgram(s)/kG/Min (3.24 mL/Hr) IV Continuous <Continuous>  enoxaparin Injectable 40 milliGRAM(s) SubCutaneous daily  furosemide Infusion 10 mG/Hr (5 mL/Hr) IV Continuous <Continuous>  furosemide Infusion 10 mG/Hr (5 mL/Hr) IV Continuous <Continuous>  methylPREDNISolone sodium succinate Injectable 30 milliGRAM(s) IV Push two times a day  midazolam Infusion 2 mG/Hr (2 mL/Hr) IV Continuous <Continuous>  norepinephrine Infusion 0.049 MICROgram(s)/kG/Min (5 mL/Hr) IV Continuous <Continuous>  pantoprazole  Injectable 40 milliGRAM(s) IV Push daily  propofol Infusion 10 MICROgram(s)/kG/Min (3.24 mL/Hr) IV Continuous <Continuous>  vancomycin  IVPB 1000 milliGRAM(s) IV Intermittent every 8 hours  vasopressin Infusion 0.05 Unit(s)/Min (3 mL/Hr) IV Continuous <Continuous>    MEDICATIONS  (PRN):  artificial  tears Solution 1 Drop(s) Both EYES three times a day PRN Dry Eyes  ondansetron Injectable 4 milliGRAM(s) IV Push every 6 hours PRN Nausea    MISC:     PHYSICAL EXAM:  Neuro: intubated, sedated, and paralyzed, PEERL  Pulm: improved air leak  CV: NSR, HD supported with Vasopressin/Levophed  Abd: softly distended, no guarding or rebound. + tympany  Ext: SCD in place b/l, no peripheral edema      LABS:  CBC Full  -  ( 09 Mar 2018 05:26 )  WBC Count : 17.7 K/uL  Hemoglobin : 9.2 g/dL  Hematocrit : 29.2 %  Platelet Count - Automated : 324 K/uL  Mean Cell Volume : 92.1 fl  Mean Cell Hemoglobin : 29.0 pg  Mean Cell Hemoglobin Concentration : 31.5 g/dL  Auto Neutrophil # : x  Auto Lymphocyte # : x  Auto Monocyte # : x  Auto Eosinophil # : x  Auto Basophil # : x  Auto Neutrophil % : x  Auto Lymphocyte % : x  Auto Monocyte % : x  Auto Eosinophil % : x  Auto Basophil % : x    03-09    134<L>  |  94<L>  |  25.0<H>  ----------------------------<  208<H>  5.2   |  33.0<H>  |  1.38<H>    Ca    7.8<L>      09 Mar 2018 05:26  Phos  1.9     03-08  Mg     2.4     03-09    TPro  6.1<L>  /  Alb  2.5<L>  /  TBili  0.2<L>  /  DBili  x   /  AST  39  /  ALT  44<H>  /  AlkPhos  157<H>  03-08    PT/INR - ( 08 Mar 2018 10:33 )   PT: 14.2 sec;   INR: 1.28 ratio         PTT - ( 08 Mar 2018 10:33 )  PTT:29.1 sec    RECENT CULTURES:  03-04 .Urine Clean Catch (Midstream) XXXX XXXX   No growth    03-04 .Sputum Sputum Serratia marcescens  Pseudomonas aeruginosa   Few White blood cells  Numerous Gram Negative Rods  Moderate Gram Positive Cocci in Pairs and Chains   Numerous Serratia marcescens  Numerous Pseudomonas aeruginosa  Numerous Routine respiratory marquis present    03-04 .Blood Blood-Peripheral XXXX XXXX   No growth at 5 days.    03-04 .Blood Blood-Peripheral XXXX XXXX   No growth at 5 days.        LIVER FUNCTIONS - ( 08 Mar 2018 10:33 )  Alb: 2.5 g/dL / Pro: 6.1 g/dL / ALK PHOS: 157 U/L / ALT: 44 U/L / AST: 39 U/L / GGT: x           CARDIAC MARKERS ( 08 Mar 2018 06:07 )  x     / <0.01 ng/mL / 31 U/L / x     / x      CARDIAC MARKERS ( 08 Mar 2018 03:56 )  x     / <0.01 ng/mL / 33 U/L / x     / x        ASSESSMENT/PLAN:  57yMale POD 9 Yfn mason. s/p PEA arrest with ROSC yesterday. Worsening resp acidosis, On levo, bicarb drip, sedated, paralyzed.   -optimize respiratory status, care per CT ICU   -strict I/O's  -NPO  -dvt ppx  -care per CT ICU surgery

## 2018-03-09 NOTE — PROGRESS NOTE ADULT - ASSESSMENT
56 y/o M with esophageal Ca s/p robotic [place] Mehran Esophagogastrectomy and jejunostomy tube placement 2/6/18, developed fever post op 3/4/18, sputum culture with Serratia marcescens and Pseudomonas aeruginosa

## 2018-03-09 NOTE — PROGRESS NOTE ADULT - ASSESSMENT
1) ATN  2) Oligo-anuria  3) Respiratory Acidosis  4) Hypotension    Patient is s/p PEA arrest with 4 minute CPR  Has ATN from poor perfusion;   Profound respiratory acidosis; pt vent up to 60 RR with Fi02 of 100%  On lasix drip 10mg/hr; -200cc/hr  Holding off on CVVHDF for now    d/w CTICU NP and Dr Craig

## 2018-03-09 NOTE — PROGRESS NOTE ADULT - SUBJECTIVE AND OBJECTIVE BOX
[facility] DIVISION OF KIDNEY DISEASES AND HYPERTENSION -- FOLLOW UP NOTE  --------------------------------------------------------------------------------  Chief Complaint: ATN    24 hour events/subjective:  56y/o  Male with h/o esophageal cancer diagnosed around October and has been on chemo-radiation since, last treatment in January 2018. Patient came in for an elective esophagogastroduodenoscopy (EGD) Robotic Yfn Mehran esophagogastrectomy 2/26/18. Patient had fever on 3/4/18, started on Vancomycin Zosyn.   This AM patient had PEA arrest after aspirating; had CPR for 4 minutes before ROSC. Has had severe respiratory acidosis with poor ventilation since.   Pt seen and examined this AM; tolerating lasix drip; still with CO2 retention;       PAST HISTORY  --------------------------------------------------------------------------------  No significant changes to PMH, PSH, FHx, SHx, unless otherwise noted    ALLERGIES & MEDICATIONS  --------------------------------------------------------------------------------  Allergies    No Known Allergies    Intolerances      Standing Inpatient Medications  cefepime  IVPB      cefepime  IVPB 2000 milliGRAM(s) IV Intermittent every 8 hours  chlorhexidine 0.12% Liquid 15 milliLiter(s) Swish and Spit two times a day  cisatracurium Infusion 1 MICROgram(s)/kG/Min IV Continuous <Continuous>  enoxaparin Injectable 40 milliGRAM(s) SubCutaneous daily  furosemide Infusion 10 mG/Hr IV Continuous <Continuous>  furosemide Infusion 10 mG/Hr IV Continuous <Continuous>  methylPREDNISolone sodium succinate Injectable 30 milliGRAM(s) IV Push two times a day  midazolam Infusion 2 mG/Hr IV Continuous <Continuous>  norepinephrine Infusion 0.049 MICROgram(s)/kG/Min IV Continuous <Continuous>  pantoprazole  Injectable 40 milliGRAM(s) IV Push daily  propofol Infusion 10 MICROgram(s)/kG/Min IV Continuous <Continuous>  vancomycin  IVPB 1000 milliGRAM(s) IV Intermittent every 8 hours  vasopressin Infusion 0.05 Unit(s)/Min IV Continuous <Continuous>    PRN Inpatient Medications  artificial  tears Solution 1 Drop(s) Both EYES three times a day PRN  ondansetron Injectable 4 milliGRAM(s) IV Push every 6 hours PRN      REVIEW OF SYSTEMS  --------------------------------------------------------------------------------  Unable to obtain    VITALS/PHYSICAL EXAM  --------------------------------------------------------------------------------  T(C): 35.6 (03-09-18 @ 12:00), Max: 37.2 (03-08-18 @ 17:00)  HR: 81 (03-09-18 @ 13:00) (75 - 112)  BP: 85/54 (03-08-18 @ 15:00) (85/54 - 99/57)  RR: 30 (03-09-18 @ 13:00) (30 - 60)  SpO2: 99% (03-09-18 @ 13:00) (76% - 100%)  Wt(kg): --        03-08-18 @ 07:01  -  03-09-18 @ 07:00  --------------------------------------------------------  IN: 4729.1 mL / OUT: 2420 mL / NET: 2309.1 mL    03-09-18 @ 07:01  -  03-09-18 @ 13:06  --------------------------------------------------------  IN: 254.5 mL / OUT: 275 mL / NET: -20.5 mL      Physical Exam:  	Gen: int/sed  	HEENT: ETT+  	Pulm: vent BS rate 40 Fi02 100%  	CV: RRR, S1S2; no rub  	Back: No spinal or CVA tenderness; no sacral edema  	Abd: +BS, soft, nontender/nondistended  	: gloria+  	LE: Warm, FROM, no clubbing, intact strength; no edema  	Skin: Warm, without rashes      LABS/STUDIES  --------------------------------------------------------------------------------              9.2    17.7  >-----------<  324      [03-09-18 @ 05:26]              29.2     134  |  94  |  25.0  ----------------------------<  208      [03-09-18 @ 05:26]  5.2   |  33.0  |  1.38        Ca     7.8     [03-09-18 @ 05:26]      Mg     2.4     [03-09-18 @ 05:26]      Phos  1.9     [03-08-18 @ 03:56]    TPro  6.1  /  Alb  2.5  /  TBili  0.2  /  DBili  x   /  AST  39  /  ALT  44  /  AlkPhos  157  [03-08-18 @ 10:33]    PT/INR: PT 14.2 , INR 1.28       [03-08-18 @ 10:33]  PTT: 29.1       [03-08-18 @ 10:33]    Troponin <0.01      [03-08-18 @ 06:07]  CK 31      [03-08-18 @ 06:07]    Creatinine Trend:  SCr 1.38 [03-09 @ 05:26]  SCr 1.35 [03-09 @ 00:50]  SCr 1.31 [03-08 @ 17:35]  SCr 0.81 [03-08 @ 10:33]  SCr 0.60 [03-08 @ 03:56]        HbA1c 5.5      [02-12-18 @ 10:54]

## 2018-03-09 NOTE — PROGRESS NOTE ADULT - SUBJECTIVE AND OBJECTIVE BOX
Intubation Note    Asked by ICU team to change 8.0 ETT to 9.0 ETT due to leak around cuff and suspected tracheomalacia.  DLx1 with MAC 3, grade 1 view, 9.0 ETT passed easily.  +EtCO2, Taped at 26cm at teeth.  BS = B/L.  No complications.

## 2018-03-09 NOTE — PROGRESS NOTE ADULT - PROBLEM SELECTOR PLAN 5
s/p robotic [place] Mehran Esophagogastrectomy and jejunostomy tube placement 2/6/18  s/p Chemo-radiation

## 2018-03-09 NOTE — PROGRESS NOTE ADULT - PROBLEM SELECTOR PLAN 1
Appreciate MICU recommendations  Vent settings at 30/480/12/100%  Titrate FiO2 to maintain SpO2 > 90%  Daily CXR  Hourly ABGs until CO2 stabilized

## 2018-03-09 NOTE — AIRWAY PLACEMENT NOTE ADULT - POST AIRWAY PLACEMENT ASSESSMENT:
chest excursion noted/breath sounds equal/positive end tidal CO2 noted/breath sounds bilateral
positive end tidal CO2 noted/chest excursion noted/breath sounds bilateral

## 2018-03-09 NOTE — PROGRESS NOTE ADULT - ASSESSMENT
57 year old Male POD# 10 robotic Yfn marlon esophagectomy with post-operative stay complicated by dysphagia, diffuse abdominal discomfort with CT C/A/P on 3/3/18 performed ruling out leak, later febrile with leukopenia with sputum culture positive for serratia and pseudomonas seen by infectious disease and treated with Cefepime. 3/8/18 Patient had episode of tachypnea, tachycardia, hypotension and acute hypoxia, treated symptomatically with oxygen supplementation; PEA arrest s/p aspiration, requiring CPR x ~ 3 minutes and Epinephrine, intubated by anesthesia. ROSC, after with continued hypercapnia despite optimal vent treatment. Post-op bronchoscopy at the bedside showing small posterior tracheal hole distal to ETT tip otherwise airways without gastric content. Post-Bronchoscopy, difficulty oxygenating patient despite optimal vent settings, nitric added eventually uptitrated to 40 ppm. Progressive labile hypoxia and hypercapnia, MICU consult called and vent adjustments made, versed added, lasix gtt started. Concern for further need of mechanical support, rota prone bed vs VV ECMO? Overnight, ABGs monitored hourly, ETT exchanges for audible leak, leak improved.     Plan overnight:  ·	Neurology: Maintain paralytics per train for four 2/4, maintain current versed dose, titrate propofol as needed keeping in mind blood pressure changes with titration, neuro checks q hour  ·	Cardiac: titrate pressor for SBP > 110  ·	Pulmonary: Titrate FiO2, wean for SpO2 > 90%, leave nitric at 40 ppm, leave peep at 12, ARDS settings; SHELLI bulb remains in place  ·	Renal: Creatinine uptrending, UO initially oliguric/anuric now responding to lasix gtt, keep K > 4, mag > 2 with frequent labs, may develop RENETTA secondary to hypoperfusion renal in contact, CVVH orders on hold  ·	GI: pending BM, monitor abdomen for signs of acute distension, abdominal xray showing contrast remaining in bowel, remains NPO  ·	Infectious disease: On cefepime for serration in the sputum, vancomycin added for aspiration pneumonia   ·	Hematology: HCT stable, WBC downtrending, platelet count stable, daily CBC

## 2018-03-09 NOTE — PROGRESS NOTE ADULT - PROBLEM SELECTOR PLAN 2
currently on pressors requirements, titrate for SBP > 110  HR NSR stable  CVP WNL  3/8/18 TTE with nl LV/RV function

## 2018-03-09 NOTE — PROGRESS NOTE ADULT - SUBJECTIVE AND OBJECTIVE BOX
[facility]  INFECTIOUS DISEASES AND INTERNAL MEDICINE at [place]  =======================================================  Jere Galarza MD FAC   Antony Spear MD  Diplomates American Board of Internal Medicine and Infectious Diseases  =======================================================    ROB [facility] [id number]    Follow up:  Pneumonia    Had Respiratory arrest 3/8/17was intubated placed on vent  Had bedside bronch with removal of about 500cc of tube feeds     Remains on vent  Sedated and paralyzed  No pressors       Allergies:  No Known Allergies      Antibiotics:    cefepime  IVPB 2000 milliGRAM(s) IV Intermittent every 8 hours  vancomycin  IVPB 1000 milliGRAM(s) IV Intermittent every 8 hours       REVIEW OF SYSTEMS:  Unable to obtain, patient is intubated on vent      Physical Exam:  Vital Signs Last 24 Hrs  T(C): 35.6 (09 Mar 2018 08:00), Max: 37.2 (08 Mar 2018 17:00)  T(F): 96.1 (09 Mar 2018 08:00), Max: 99 (08 Mar 2018 17:00)  HR: 85 (09 Mar 2018 08:30) (81 - 112)  BP: 85/54 (08 Mar 2018 15:00) (81/52 - 99/57)  BP(mean): 63 (08 Mar 2018 15:00) (62 - 71)  RR: 30 (09 Mar 2018 08:30) (20 - 55)  SpO2: 97% (09 Mar 2018 08:30) (75% - 100%)      GEN: sedated on vent  HEENT: normocephalic and atraumatic.  +NGT + ETT  NECK: Supple.  LUNGS: coarse BS B/L  HEART: Regular rate and rhythm  ABDOMEN: Soft, nontender, and nondistended.  Positive bowel sounds.    : + Garcia  EXTREMITIES: Without any edema.  MSK: No joint swelling  NEUROLOGIC: Sedated  PSYCHIATRIC: Unable to obtain, patient sedated   SKIN: No rash      Labs:  03-09    134<L>  |  94<L>  |  25.0<H>  ----------------------------<  208<H>  5.2   |  33.0<H>  |  1.38<H>    Ca    7.8<L>      09 Mar 2018 05:26  Phos  1.9     03-08  Mg     2.4     03-09    TPro  6.1<L>  /  Alb  2.5<L>  /  TBili  0.2<L>  /  DBili  x   /  AST  39  /  ALT  44<H>  /  AlkPhos  157<H>  03-08               9.2    17.7  )-----------( 324      ( 09 Mar 2018 05:26 )             29.2       PT/INR - ( 08 Mar 2018 10:33 )   PT: 14.2 sec;   INR: 1.28 ratio         PTT - ( 08 Mar 2018 10:33 )  PTT:29.1 sec    LIVER FUNCTIONS - ( 08 Mar 2018 10:33 )  Alb: 2.5 g/dL / Pro: 6.1 g/dL / ALK PHOS: 157 U/L / ALT: 44 U/L / AST: 39 U/L / GGT: x           CARDIAC MARKERS ( 08 Mar 2018 06:07 )  x     / <0.01 ng/mL / 31 U/L / x     / x      CARDIAC MARKERS ( 08 Mar 2018 03:56 )  x     / <0.01 ng/mL / 33 U/L / x     / x          ABG - ( 09 Mar 2018 09:19 )  pH: 7.16  /  pCO2: 101   /  pO2: 101   / HCO3: 29    / Base Excess: 4.9   /  SaO2: 97            RECENT CULTURES:  03-04 @ 05:44 .Urine Clean Catch (Midstream)     No growth      03-04 @ 02:47 .Sputum Sputum Serratia marcescens  Pseudomonas aeruginosa    Numerous Serratia marcescens  Numerous Pseudomonas aeruginosa  Numerous Routine respiratory marquis present  Few White blood cells  Numerous Gram Negative Rods  Moderate Gram Positive Cocci in Pairs and Chains      03-04 @ 01:08 .Blood Blood-Peripheral     No growth at 5 days.      03-04 @ 01:07 .Blood Blood-Peripheral     No growth at 5 days.        EXAM:  XR CHEST PORTABLE ROUTINE 1V                        PROCEDURE DATE:  03/09/2018    INTERPRETATION:  CHEST AP PORTABLE:  History: s/p esophagectomy.   Date and time of exam: 3/9/2018 4:47 AM.  Technique: A single AP view of thechest was obtained.  Comparison exam: 3/8/2018 11:27 PM.  Findings:  No change in lines and tubes since the prior study. The left lung is   clear. There is a persistent infiltrate in the right lung, unchanged. No   evidence of pneumothorax..  Impression:  Stable exam without significant change since the previous study

## 2018-03-09 NOTE — PROGRESS NOTE ADULT - PROBLEM SELECTOR PLAN 2
oxygenation improved, but not ventilating enlarged ET tube placed, increased I:E ratio, paralyzed, sedated, still with air leak better than prior, ECMO per CTS, continue antibiotics

## 2018-03-09 NOTE — PROGRESS NOTE ADULT - SUBJECTIVE AND OBJECTIVE BOX
Brief summary:  57 year old Male POD# 10 robotic Yfn marlon esophagectomy with post-operative stay complicated by dysphagia, diffuse abdominal discomfort with CT C/A/P on 3/3/18 performed ruling out leak, later febrile with leukopenia with sputum culture positive for serratia and pseudomonas seen by infectious disease and treated with Cefepime. 3/8/18 Patient had episode of tachypnea, tachycardia, hypotension and acute hypoxia, treated symptomatically with oxygen supplementation; PEA arrest s/p aspiration, requiring CPR x ~ 3 minutes and Epinephrine, intubated by anesthesia. ROSC, after with continued hypercapnia despite optimal vent treatment. Post-op bronchoscopy at the bedside showing small posterior tracheal hole distal to ETT tip otherwise airways without gastric content. Post-Bronchoscopy, difficulty oxygenating patient despite optimal vent settings, nitric added eventually uptitrated to 40 ppm. Progressive labile hypoxia and hypercapnia, MICU consult called and vent adjustments made, versed added, lasix gtt started.     Overnight events:  Concern for further need of mechanical support, rota prone bed vs VV ECMO? Overnight, ABGs monitored hourly, ETT exchanges for audible leak, leak improved.       Past Medical History:  Malignant neoplasm of esophagus  Family history of prostate cancer in father (Father)  Weight loss, unintentional  Hodgkins lymphoma  Acid reflux disease  RENETTA (acute kidney injury)  Shock  ARDS (adult respiratory distress syndrome)  Cardiac arrest  Respiratory arrest  Immunosuppressed due to chemotherapy  Pseudomonas pneumonia  Serratia marcescens infection  Constipation  Generalized abdominal pain  Leukopenia  SIRS (systemic inflammatory response syndrome)  Pneumonia, bacterial  Jejunostomy tube in situ  Robot-assisted esophagectomy using [employer] system  EGD  Jejunostomy feeding tube placement      cefepime  IVPB      cefepime  IVPB 2000 milliGRAM(s) IV Intermittent every 8 hours  chlorhexidine 0.12% Liquid 15 milliLiter(s) Swish and Spit two times a day  cisatracurium Infusion 1 MICROgram(s)/kG/Min IV Continuous <Continuous>  enoxaparin Injectable 40 milliGRAM(s) SubCutaneous daily  furosemide Infusion 10 mG/Hr IV Continuous <Continuous>  furosemide Infusion 10 mG/Hr IV Continuous <Continuous>  methylPREDNISolone sodium succinate Injectable 30 milliGRAM(s) IV Push two times a day  midazolam Infusion 2 mG/Hr IV Continuous <Continuous>  norepinephrine Infusion 0.049 MICROgram(s)/kG/Min IV Continuous <Continuous>  ondansetron Injectable 4 milliGRAM(s) IV Push every 6 hours PRN  pantoprazole  Injectable 40 milliGRAM(s) IV Push daily  propofol Infusion 10 MICROgram(s)/kG/Min IV Continuous <Continuous>  sodium bicarbonate  Infusion 0.074 mEq/kG/Hr IV Continuous <Continuous>  vancomycin  IVPB 1000 milliGRAM(s) IV Intermittent every 8 hours  vasopressin Infusion 0.05 Unit(s)/Min IV Continuous <Continuous>  MEDICATIONS  (PRN):  ondansetron Injectable 4 milliGRAM(s) IV Push every 6 hours PRN Nausea    Mode: AC/ CMV (Assist Control/ Continuous Mandatory Ventilation), RR (machine): 30, TV (machine): 480, FiO2: 100, PEEP: 12, MAP: 19, PIP: 38  Daily     Daily       ABG - ( 08 Mar 2018 20:49 )  pH: 7.10  /  pCO2: *     /  pO2: 92    / HCO3: 27    / Base Excess: 2.7   /  SaO2: 95                              9.9    2.9   )-----------( 351      ( 08 Mar 2018 17:35 )             30.7   03-08    140  |  98  |  23.0<H>  ----------------------------<  101  5.0   |  33.0<H>  |  1.31<H>    Ca    7.9<L>      08 Mar 2018 17:35  Phos  1.9     03-08  Mg     2.7     03-08    TPro  6.1<L>  /  Alb  2.5<L>  /  TBili  0.2<L>  /  DBili  x   /  AST  39  /  ALT  44<H>  /  AlkPhos  157<H>  03-08    CARDIAC MARKERS ( 08 Mar 2018 06:07 )  x     / <0.01 ng/mL / 31 U/L / x     / x      CARDIAC MARKERS ( 08 Mar 2018 03:56 )  x     / <0.01 ng/mL / 33 U/L / x     / x        PT/INR - ( 08 Mar 2018 10:33 )   PT: 14.2 sec;   INR: 1.28 ratio         PTT - ( 08 Mar 2018 10:33 )  PTT:29.1 sec      Objective:  T(C): 36 (03-08-18 @ 23:30), Max: 37.2 (03-08-18 @ 17:00)  HR: 84 (03-08-18 @ 23:30) (80 - 119)  BP: 85/54 (03-08-18 @ 15:00) (81/52 - 155/66)  RR: 30 (03-08-18 @ 23:30) (20 - 55)  SpO2: 100% (03-08-18 @ 23:30) (56% - 100%)  Wt(kg): --CAPILLARY BLOOD GLUCOSE      I&O's Summary    07 Mar 2018 07:01  -  08 Mar 2018 07:00  --------------------------------------------------------  IN: 2270 mL / OUT: 1340 mL / NET: 930 mL    08 Mar 2018 07:01  -  09 Mar 2018 00:05  --------------------------------------------------------  IN: 3529.1 mL / OUT: 1250 mL / NET: 2279.1 mL        Physical Exam  Neuro: intubated, sedated and paralyzed, pupils equal and reactive although sluggish  Pulm: rhonchorus at the bilateral bases, expiratory wheeze, audible airleak around proximal section of neck  CV: NSR, HD supported with Vasopressin/Levophed, no murmurs, no rub  Abd: soft, mildly distended, no acute abdomen  Ext: +DP Pulses b/l, +PT pulses, no edema  Inc: +right femoral TLC, + right IJ TLC, + left femoral jeferson, + left radial jeferson

## 2018-03-09 NOTE — PROGRESS NOTE ADULT - SUBJECTIVE AND OBJECTIVE BOX
Patient is a 57y old  Male who presents with a chief complaint of Esophageal Cancer (12 Feb 2018 10:28)      BRIEF HOSPITAL COURSE: 56 yo admitted electively for [place] Mehran esophagectomy for esophageal cancer on 2/26; had an aspiration event this AM and developed PEA arrest with difficulty oxygenating, ventilating, and shock.  Medical critical care service was consulted for severe ARDS management. Persistant hypercarbia    Events last 24 hours: + air leak ET tube changed to 9, still with some air leak better      PAST MEDICAL & SURGICAL HISTORY:  Esophageal cancer  Weight loss, unintentional  Hodgkins lymphoma: 25 years ago  Acid reflux disease  No significant past surgical history      Review of Systems:  unable to obtain      Medications:  cefepime  IVPB      cefepime  IVPB 2000 milliGRAM(s) IV Intermittent every 8 hours  vancomycin  IVPB 1000 milliGRAM(s) IV Intermittent every 8 hours    furosemide Infusion 10 mG/Hr IV Continuous <Continuous>  furosemide Infusion 10 mG/Hr IV Continuous <Continuous>  norepinephrine Infusion 0.049 MICROgram(s)/kG/Min IV Continuous <Continuous>      cisatracurium Infusion 1 MICROgram(s)/kG/Min IV Continuous <Continuous>  midazolam Infusion 2 mG/Hr IV Continuous <Continuous>  ondansetron Injectable 4 milliGRAM(s) IV Push every 6 hours PRN  propofol Infusion 10 MICROgram(s)/kG/Min IV Continuous <Continuous>      enoxaparin Injectable 40 milliGRAM(s) SubCutaneous daily    pantoprazole  Injectable 40 milliGRAM(s) IV Push daily      methylPREDNISolone sodium succinate Injectable 30 milliGRAM(s) IV Push two times a day  vasopressin Infusion 0.05 Unit(s)/Min IV Continuous <Continuous>        artificial  tears Solution 1 Drop(s) Both EYES three times a day PRN  chlorhexidine 0.12% Liquid 15 milliLiter(s) Swish and Spit two times a day        Mode: AC/ CMV (Assist Control/ Continuous Mandatory Ventilation)  RR (machine): 30  TV (machine): 450  FiO2: 100  PEEP: 10  MAP: 19  PIP: 38      ICU Vital Signs Last 24 Hrs  T(C): 35.6 (09 Mar 2018 12:00), Max: 37.2 (08 Mar 2018 17:00)  T(F): 96.1 (09 Mar 2018 12:00), Max: 99 (08 Mar 2018 17:00)  HR: 80 (09 Mar 2018 14:30) (75 - 111)  BP: --  BP(mean): --  ABP: 115/50 (09 Mar 2018 14:30) (81/56 - 137/68)  ABP(mean): 69 (09 Mar 2018 14:30) (61 - 89)  RR: 30 (09 Mar 2018 14:30) (30 - 60)  SpO2: 99% (09 Mar 2018 13:00) (84% - 100%)      ABG - ( 09 Mar 2018 13:10 )  pH: 7.12  /  pCO2: x     /  pO2: 109   / HCO3: 29    / Base Excess: 4.7   /  SaO2: 98                  I&O's Detail    08 Mar 2018 07:01  -  09 Mar 2018 07:00  --------------------------------------------------------  IN:    cisatracurium Infusion: 116.3 mL    furosemide Infusion: 50 mL    midazolam Infusion: 22 mL    norepinephrine Infusion: 391.5 mL    propofol Infusion: 31.7 mL    sodium bicarbonate  Infusion: 1440 mL    sodium chloride 0.9%: 150 mL    Sodium Chloride 0.9% IV Bolus: 1500 mL    Solution: 750 mL    Solution: 250 mL    vasopressin Infusion: 27.6 mL  Total IN: 4729.1 mL    OUT:    Bulb: 30 mL    Indwelling Catheter - Urethral: 1990 mL    Nasoenteral Tube: 400 mL  Total OUT: 2420 mL    Total NET: 2309.1 mL      09 Mar 2018 07:01  -  09 Mar 2018 15:27  --------------------------------------------------------  IN:    cisatracurium Infusion: 67.9 mL    furosemide Infusion: 35 mL    midazolam Infusion: 14 mL    norepinephrine Infusion: 8.1 mL    propofol Infusion: 11.2 mL    sodium bicarbonate  Infusion: 160 mL  Total IN: 296.2 mL    OUT:    Bulb: 30 mL    Indwelling Catheter - Urethral: 420 mL  Total OUT: 450 mL    Total NET: -153.8 mL            LABS:                        9.2    17.7  )-----------( 324      ( 09 Mar 2018 05:26 )             29.2     03-09    134<L>  |  94<L>  |  25.0<H>  ----------------------------<  208<H>  5.2   |  33.0<H>  |  1.38<H>    Ca    7.8<L>      09 Mar 2018 05:26  Phos  1.9     03-08  Mg     2.4     03-09    TPro  6.1<L>  /  Alb  2.5<L>  /  TBili  0.2<L>  /  DBili  x   /  AST  39  /  ALT  44<H>  /  AlkPhos  157<H>  03-08      CARDIAC MARKERS ( 08 Mar 2018 06:07 )  x     / <0.01 ng/mL / 31 U/L / x     / x      CARDIAC MARKERS ( 08 Mar 2018 03:56 )  x     / <0.01 ng/mL / 33 U/L / x     / x          CAPILLARY BLOOD GLUCOSE        PT/INR - ( 08 Mar 2018 10:33 )   PT: 14.2 sec;   INR: 1.28 ratio         PTT - ( 08 Mar 2018 10:33 )  PTT:29.1 sec    CULTURES:  Culture Results:   No growth (03-04-18 @ 05:44)  Culture Results:   Numerous Serratia marcescens  Numerous Pseudomonas aeruginosa  Numerous Routine respiratory marquis present (03-04-18 @ 02:47)  Culture Results:   No growth at 5 days. (03-04-18 @ 01:08)  Culture Results:   No growth at 5 days. (03-04-18 @ 01:07)      Physical Examination:    General: No acute distress.  intubated and sedated    HEENT: Pupils equal, reactive to light.  Symmetric.    PULM: Clear to auscultation bilaterally, no significant sputum production    CVS: Regular rate and rhythm, no murmurs, rubs, or gallops    ABD: Soft, nondistended, nontender, normoactive bowel sounds, no masses    EXT: No edema, nontender    SKIN: Warm and well perfused, no rashes noted.    RADIOLOGY:     CRITICAL CARE TIME SPENT: Patient is a 57y old  Male who presents with a chief complaint of Esophageal Cancer (12 Feb 2018 10:28)      BRIEF HOSPITAL COURSE: 58 yo admitted electively for [place] Mehran esophagectomy for esophageal cancer on 2/26; had an aspiration event this AM and developed PEA arrest with difficulty oxygenating, ventilating, and shock.  Medical critical care service was consulted for severe ARDS management. Persistant hypercarbia    Events last 24 hours: + air leak ET tube changed to 9, still with some air leak better      PAST MEDICAL & SURGICAL HISTORY:  Esophageal cancer  Weight loss, unintentional  Hodgkins lymphoma: 25 years ago  Acid reflux disease  No significant past surgical history      Review of Systems:  unable to obtain      Medications:  cefepime  IVPB      cefepime  IVPB 2000 milliGRAM(s) IV Intermittent every 8 hours  vancomycin  IVPB 1000 milliGRAM(s) IV Intermittent every 8 hours    furosemide Infusion 10 mG/Hr IV Continuous <Continuous>  furosemide Infusion 10 mG/Hr IV Continuous <Continuous>  norepinephrine Infusion 0.049 MICROgram(s)/kG/Min IV Continuous <Continuous>      cisatracurium Infusion 1 MICROgram(s)/kG/Min IV Continuous <Continuous>  midazolam Infusion 2 mG/Hr IV Continuous <Continuous>  ondansetron Injectable 4 milliGRAM(s) IV Push every 6 hours PRN  propofol Infusion 10 MICROgram(s)/kG/Min IV Continuous <Continuous>      enoxaparin Injectable 40 milliGRAM(s) SubCutaneous daily    pantoprazole  Injectable 40 milliGRAM(s) IV Push daily      methylPREDNISolone sodium succinate Injectable 30 milliGRAM(s) IV Push two times a day  vasopressin Infusion 0.05 Unit(s)/Min IV Continuous <Continuous>        artificial  tears Solution 1 Drop(s) Both EYES three times a day PRN  chlorhexidine 0.12% Liquid 15 milliLiter(s) Swish and Spit two times a day        Mode: AC/ CMV (Assist Control/ Continuous Mandatory Ventilation)  RR (machine): 30  TV (machine): 450  FiO2: 100  PEEP: 10  MAP: 19  PIP: 38      ICU Vital Signs Last 24 Hrs  T(C): 35.6 (09 Mar 2018 12:00), Max: 37.2 (08 Mar 2018 17:00)  T(F): 96.1 (09 Mar 2018 12:00), Max: 99 (08 Mar 2018 17:00)  HR: 80 (09 Mar 2018 14:30) (75 - 111)  BP: --  BP(mean): --  ABP: 115/50 (09 Mar 2018 14:30) (81/56 - 137/68)  ABP(mean): 69 (09 Mar 2018 14:30) (61 - 89)  RR: 30 (09 Mar 2018 14:30) (30 - 60)  SpO2: 99% (09 Mar 2018 13:00) (84% - 100%)      ABG - ( 09 Mar 2018 13:10 )  pH: 7.12  /  pCO2: x     /  pO2: 109   / HCO3: 29    / Base Excess: 4.7   /  SaO2: 98                  I&O's Detail    08 Mar 2018 07:01  -  09 Mar 2018 07:00  --------------------------------------------------------  IN:    cisatracurium Infusion: 116.3 mL    furosemide Infusion: 50 mL    midazolam Infusion: 22 mL    norepinephrine Infusion: 391.5 mL    propofol Infusion: 31.7 mL    sodium bicarbonate  Infusion: 1440 mL    sodium chloride 0.9%: 150 mL    Sodium Chloride 0.9% IV Bolus: 1500 mL    Solution: 750 mL    Solution: 250 mL    vasopressin Infusion: 27.6 mL  Total IN: 4729.1 mL    OUT:    Bulb: 30 mL    Indwelling Catheter - Urethral: 1990 mL    Nasoenteral Tube: 400 mL  Total OUT: 2420 mL    Total NET: 2309.1 mL      09 Mar 2018 07:01  -  09 Mar 2018 15:27  --------------------------------------------------------  IN:    cisatracurium Infusion: 67.9 mL    furosemide Infusion: 35 mL    midazolam Infusion: 14 mL    norepinephrine Infusion: 8.1 mL    propofol Infusion: 11.2 mL    sodium bicarbonate  Infusion: 160 mL  Total IN: 296.2 mL    OUT:    Bulb: 30 mL    Indwelling Catheter - Urethral: 420 mL  Total OUT: 450 mL    Total NET: -153.8 mL            LABS:                        9.2    17.7  )-----------( 324      ( 09 Mar 2018 05:26 )             29.2     03-09    134<L>  |  94<L>  |  25.0<H>  ----------------------------<  208<H>  5.2   |  33.0<H>  |  1.38<H>    Ca    7.8<L>      09 Mar 2018 05:26  Phos  1.9     03-08  Mg     2.4     03-09    TPro  6.1<L>  /  Alb  2.5<L>  /  TBili  0.2<L>  /  DBili  x   /  AST  39  /  ALT  44<H>  /  AlkPhos  157<H>  03-08      CARDIAC MARKERS ( 08 Mar 2018 06:07 )  x     / <0.01 ng/mL / 31 U/L / x     / x      CARDIAC MARKERS ( 08 Mar 2018 03:56 )  x     / <0.01 ng/mL / 33 U/L / x     / x          CAPILLARY BLOOD GLUCOSE        PT/INR - ( 08 Mar 2018 10:33 )   PT: 14.2 sec;   INR: 1.28 ratio         PTT - ( 08 Mar 2018 10:33 )  PTT:29.1 sec    CULTURES:  Culture Results:   No growth (03-04-18 @ 05:44)  Culture Results:   Numerous Serratia marcescens  Numerous Pseudomonas aeruginosa  Numerous Routine respiratory marquis present (03-04-18 @ 02:47)  Culture Results:   No growth at 5 days. (03-04-18 @ 01:08)  Culture Results:   No growth at 5 days. (03-04-18 @ 01:07)      Physical Examination:    General: No acute distress.  intubated and sedated    HEENT: Pupils equal, reactive to light.  Symmetric.    PULM: Course R>L     CVS: Regular rate and rhythm, no murmurs, rubs, or gallops    ABD: Soft, nondistended, nontender, normoactive bowel sounds, no masses    EXT: No edema, nontender    SKIN: Warm and well perfused, no rashes noted.    RADIOLOGY:     CRITICAL CARE TIME SPENT:

## 2018-03-09 NOTE — AIRWAY PLACEMENT NOTE ADULT - AIRWAY COMMENTS:
ETT exchanged under direct vision with #3 Glydscope
ET tube removed per Dr Hayden due to large leak noted during ventilation causing questionable confirmation.

## 2018-03-09 NOTE — PROGRESS NOTE ADULT - PROBLEM SELECTOR PLAN 3
Baseline creatinine ~0.8  Creatinine uptrending, concern for RENETTA from hypoperfusion during PEA arrest  Renal consulted, may need CVVH  Right now responding to lasix gtt

## 2018-03-09 NOTE — PROGRESS NOTE ADULT - PROBLEM SELECTOR PLAN 1
Patient with respiratory arrest 3/8/17, lost pulse and had 4-5 minutes of CPR  Intubated on Vent  had emergent bronch with suction of about 500cc of tube feeds

## 2018-03-10 LAB
ALBUMIN SERPL ELPH-MCNC: 2.4 G/DL — LOW (ref 3.3–5.2)
ALBUMIN SERPL ELPH-MCNC: 2.5 G/DL — LOW (ref 3.3–5.2)
ALP SERPL-CCNC: 101 U/L — SIGNIFICANT CHANGE UP (ref 40–120)
ALP SERPL-CCNC: 97 U/L — SIGNIFICANT CHANGE UP (ref 40–120)
ALT FLD-CCNC: 30 U/L — SIGNIFICANT CHANGE UP
ALT FLD-CCNC: 32 U/L — SIGNIFICANT CHANGE UP
ANION GAP SERPL CALC-SCNC: 12 MMOL/L — SIGNIFICANT CHANGE UP (ref 5–17)
ANION GAP SERPL CALC-SCNC: 14 MMOL/L — SIGNIFICANT CHANGE UP (ref 5–17)
AST SERPL-CCNC: 30 U/L — SIGNIFICANT CHANGE UP
AST SERPL-CCNC: 30 U/L — SIGNIFICANT CHANGE UP
BILIRUB SERPL-MCNC: 0.2 MG/DL — LOW (ref 0.4–2)
BILIRUB SERPL-MCNC: 0.3 MG/DL — LOW (ref 0.4–2)
BUN SERPL-MCNC: 33 MG/DL — HIGH (ref 8–20)
BUN SERPL-MCNC: 40 MG/DL — HIGH (ref 8–20)
CALCIUM SERPL-MCNC: 8.2 MG/DL — LOW (ref 8.6–10.2)
CALCIUM SERPL-MCNC: 8.3 MG/DL — LOW (ref 8.6–10.2)
CHLORIDE SERPL-SCNC: 87 MMOL/L — LOW (ref 98–107)
CHLORIDE SERPL-SCNC: 87 MMOL/L — LOW (ref 98–107)
CO2 SERPL-SCNC: 33 MMOL/L — HIGH (ref 22–29)
CO2 SERPL-SCNC: 36 MMOL/L — HIGH (ref 22–29)
CREAT SERPL-MCNC: 1.53 MG/DL — HIGH (ref 0.5–1.3)
CREAT SERPL-MCNC: 1.61 MG/DL — HIGH (ref 0.5–1.3)
GAS PNL BLDA: SIGNIFICANT CHANGE UP
GLUCOSE SERPL-MCNC: 113 MG/DL — SIGNIFICANT CHANGE UP (ref 70–115)
GLUCOSE SERPL-MCNC: 128 MG/DL — HIGH (ref 70–115)
HCT VFR BLD CALC: 26.9 % — LOW (ref 42–52)
HCT VFR BLD CALC: 28.2 % — LOW (ref 42–52)
HGB BLD-MCNC: 8.9 G/DL — LOW (ref 14–18)
HGB BLD-MCNC: 9.3 G/DL — LOW (ref 14–18)
MAGNESIUM SERPL-MCNC: 2.2 MG/DL — SIGNIFICANT CHANGE UP (ref 1.6–2.6)
MAGNESIUM SERPL-MCNC: 2.3 MG/DL — SIGNIFICANT CHANGE UP (ref 1.6–2.6)
MCHC RBC-ENTMCNC: 28.7 PG — SIGNIFICANT CHANGE UP (ref 27–31)
MCHC RBC-ENTMCNC: 29.4 PG — SIGNIFICANT CHANGE UP (ref 27–31)
MCHC RBC-ENTMCNC: 33 G/DL — SIGNIFICANT CHANGE UP (ref 32–36)
MCHC RBC-ENTMCNC: 33.1 G/DL — SIGNIFICANT CHANGE UP (ref 32–36)
MCV RBC AUTO: 86.8 FL — SIGNIFICANT CHANGE UP (ref 80–94)
MCV RBC AUTO: 89.2 FL — SIGNIFICANT CHANGE UP (ref 80–94)
PHOSPHATE SERPL-MCNC: 3.7 MG/DL — SIGNIFICANT CHANGE UP (ref 2.4–4.7)
PLATELET # BLD AUTO: 383 K/UL — SIGNIFICANT CHANGE UP (ref 150–400)
PLATELET # BLD AUTO: 392 K/UL — SIGNIFICANT CHANGE UP (ref 150–400)
POTASSIUM SERPL-MCNC: 4.1 MMOL/L — SIGNIFICANT CHANGE UP (ref 3.5–5.3)
POTASSIUM SERPL-MCNC: 4.8 MMOL/L — SIGNIFICANT CHANGE UP (ref 3.5–5.3)
POTASSIUM SERPL-SCNC: 4.1 MMOL/L — SIGNIFICANT CHANGE UP (ref 3.5–5.3)
POTASSIUM SERPL-SCNC: 4.8 MMOL/L — SIGNIFICANT CHANGE UP (ref 3.5–5.3)
PROT SERPL-MCNC: 6.5 G/DL — LOW (ref 6.6–8.7)
PROT SERPL-MCNC: 6.6 G/DL — SIGNIFICANT CHANGE UP (ref 6.6–8.7)
RBC # BLD: 3.1 M/UL — LOW (ref 4.6–6.2)
RBC # BLD: 3.16 M/UL — LOW (ref 4.6–6.2)
RBC # FLD: 15.3 % — SIGNIFICANT CHANGE UP (ref 11–15.6)
RBC # FLD: 15.3 % — SIGNIFICANT CHANGE UP (ref 11–15.6)
SODIUM SERPL-SCNC: 134 MMOL/L — LOW (ref 135–145)
SODIUM SERPL-SCNC: 135 MMOL/L — SIGNIFICANT CHANGE UP (ref 135–145)
TRIGL SERPL-MCNC: 136 MG/DL — SIGNIFICANT CHANGE UP (ref 10–200)
VANCOMYCIN TROUGH SERPL-MCNC: 37.4 UG/ML — CRITICAL HIGH (ref 10–20)
WBC # BLD: 17.6 K/UL — HIGH (ref 4.8–10.8)
WBC # BLD: 18.2 K/UL — HIGH (ref 4.8–10.8)
WBC # FLD AUTO: 17.6 K/UL — HIGH (ref 4.8–10.8)
WBC # FLD AUTO: 18.2 K/UL — HIGH (ref 4.8–10.8)

## 2018-03-10 PROCEDURE — 99291 CRITICAL CARE FIRST HOUR: CPT

## 2018-03-10 PROCEDURE — 99233 SBSQ HOSP IP/OBS HIGH 50: CPT

## 2018-03-10 PROCEDURE — 71045 X-RAY EXAM CHEST 1 VIEW: CPT | Mod: 26

## 2018-03-10 RX ORDER — ACETAZOLAMIDE 250 MG/1
250 TABLET ORAL ONCE
Qty: 0 | Refills: 0 | Status: COMPLETED | OUTPATIENT
Start: 2018-03-10 | End: 2018-03-10

## 2018-03-10 RX ORDER — POTASSIUM CHLORIDE 20 MEQ
10 PACKET (EA) ORAL
Qty: 0 | Refills: 0 | Status: COMPLETED | OUTPATIENT
Start: 2018-03-10 | End: 2018-03-10

## 2018-03-10 RX ORDER — CEFEPIME 1 G/1
2000 INJECTION, POWDER, FOR SOLUTION INTRAMUSCULAR; INTRAVENOUS EVERY 12 HOURS
Qty: 0 | Refills: 0 | Status: DISCONTINUED | OUTPATIENT
Start: 2018-03-10 | End: 2018-03-14

## 2018-03-10 RX ORDER — FENTANYL CITRATE 50 UG/ML
50 INJECTION INTRAVENOUS ONCE
Qty: 0 | Refills: 0 | Status: DISCONTINUED | OUTPATIENT
Start: 2018-03-10 | End: 2018-03-10

## 2018-03-10 RX ORDER — FENTANYL CITRATE 50 UG/ML
50 INJECTION INTRAVENOUS
Qty: 0 | Refills: 0 | Status: DISCONTINUED | OUTPATIENT
Start: 2018-03-10 | End: 2018-03-14

## 2018-03-10 RX ORDER — POTASSIUM CHLORIDE 20 MEQ
20 PACKET (EA) ORAL
Qty: 0 | Refills: 0 | Status: COMPLETED | OUTPATIENT
Start: 2018-03-10 | End: 2018-03-11

## 2018-03-10 RX ADMIN — Medication 30 MILLIGRAM(S): at 05:30

## 2018-03-10 RX ADMIN — CEFEPIME 100 MILLIGRAM(S): 1 INJECTION, POWDER, FOR SOLUTION INTRAMUSCULAR; INTRAVENOUS at 17:16

## 2018-03-10 RX ADMIN — ACETAZOLAMIDE 250 MILLIGRAM(S): 250 TABLET ORAL at 16:49

## 2018-03-10 RX ADMIN — Medication 250 MILLIGRAM(S): at 05:30

## 2018-03-10 RX ADMIN — FENTANYL CITRATE 50 MICROGRAM(S): 50 INJECTION INTRAVENOUS at 19:32

## 2018-03-10 RX ADMIN — FENTANYL CITRATE 50 MICROGRAM(S): 50 INJECTION INTRAVENOUS at 23:33

## 2018-03-10 RX ADMIN — FENTANYL CITRATE 50 MICROGRAM(S): 50 INJECTION INTRAVENOUS at 12:49

## 2018-03-10 RX ADMIN — ENOXAPARIN SODIUM 40 MILLIGRAM(S): 100 INJECTION SUBCUTANEOUS at 11:39

## 2018-03-10 RX ADMIN — Medication 30 MILLIGRAM(S): at 17:16

## 2018-03-10 RX ADMIN — FENTANYL CITRATE 50 MICROGRAM(S): 50 INJECTION INTRAVENOUS at 08:30

## 2018-03-10 RX ADMIN — FENTANYL CITRATE 50 MICROGRAM(S): 50 INJECTION INTRAVENOUS at 09:45

## 2018-03-10 RX ADMIN — FENTANYL CITRATE 50 MICROGRAM(S): 50 INJECTION INTRAVENOUS at 09:30

## 2018-03-10 RX ADMIN — FENTANYL CITRATE 50 MICROGRAM(S): 50 INJECTION INTRAVENOUS at 15:35

## 2018-03-10 RX ADMIN — CHLORHEXIDINE GLUCONATE 15 MILLILITER(S): 213 SOLUTION TOPICAL at 17:16

## 2018-03-10 RX ADMIN — CHLORHEXIDINE GLUCONATE 15 MILLILITER(S): 213 SOLUTION TOPICAL at 05:31

## 2018-03-10 RX ADMIN — FENTANYL CITRATE 50 MICROGRAM(S): 50 INJECTION INTRAVENOUS at 10:02

## 2018-03-10 RX ADMIN — FENTANYL CITRATE 50 MICROGRAM(S): 50 INJECTION INTRAVENOUS at 08:00

## 2018-03-10 RX ADMIN — CEFEPIME 100 MILLIGRAM(S): 1 INJECTION, POWDER, FOR SOLUTION INTRAMUSCULAR; INTRAVENOUS at 07:22

## 2018-03-10 RX ADMIN — FENTANYL CITRATE 50 MICROGRAM(S): 50 INJECTION INTRAVENOUS at 23:45

## 2018-03-10 RX ADMIN — FENTANYL CITRATE 50 MICROGRAM(S): 50 INJECTION INTRAVENOUS at 10:41

## 2018-03-10 RX ADMIN — Medication 1 DROP(S): at 05:30

## 2018-03-10 RX ADMIN — FENTANYL CITRATE 50 MICROGRAM(S): 50 INJECTION INTRAVENOUS at 17:17

## 2018-03-10 RX ADMIN — PANTOPRAZOLE SODIUM 40 MILLIGRAM(S): 20 TABLET, DELAYED RELEASE ORAL at 11:39

## 2018-03-10 NOTE — PROGRESS NOTE ADULT - ASSESSMENT
58 y/o M with esophageal Ca s/p robotic [place] Mehran Esophagogastrectomy and jejunostomy tube placement 2/6/18, developed fever post op 3/4/18, sputum culture with Serratia marcescens and Pseudomonas aeruginosa

## 2018-03-10 NOTE — PROGRESS NOTE ADULT - SUBJECTIVE AND OBJECTIVE BOX
[facility] DIVISION OF KIDNEY DISEASES AND HYPERTENSION -- FOLLOW UP NOTE  --------------------------------------------------------------------------------  Chief Complaint: ATN    24 hour events/subjective:  Pt seen and examined  Intubated; sedated  Improved CO2 retention  Good diuresis on lasix drip      PAST HISTORY  --------------------------------------------------------------------------------  No significant changes to PMH, PSH, FHx, SHx, unless otherwise noted    ALLERGIES & MEDICATIONS  --------------------------------------------------------------------------------  Allergies    No Known Allergies    Intolerances      Standing Inpatient Medications  acetazolamide Injectable 250 milliGRAM(s) IV Push once  cefepime  IVPB 2000 milliGRAM(s) IV Intermittent every 12 hours  chlorhexidine 0.12% Liquid 15 milliLiter(s) Swish and Spit two times a day  enoxaparin Injectable 40 milliGRAM(s) SubCutaneous daily  furosemide Infusion 5 mG/Hr IV Continuous <Continuous>  methylPREDNISolone sodium succinate Injectable 30 milliGRAM(s) IV Push two times a day  midazolam Infusion 2 mG/Hr IV Continuous <Continuous>  pantoprazole  Injectable 40 milliGRAM(s) IV Push daily  propofol Infusion 10 MICROgram(s)/kG/Min IV Continuous <Continuous>    PRN Inpatient Medications  artificial  tears Solution 1 Drop(s) Both EYES three times a day PRN  bisacodyl Suppository 10 milliGRAM(s) Rectal daily PRN  fentaNYL    Injectable 50 MICROGram(s) IV Push every 3 hours PRN      REVIEW OF SYSTEMS  --------------------------------------------------------------------------------  Gen: No weight changes, fatigue, fevers/chills, weakness  Skin: No rashes  Head/Eyes/Ears/Mouth: No headache; Normal hearing; Normal vision w/o blurriness; No sinus pain/discomfort, sore throat  Respiratory: No dyspnea, cough, wheezing, hemoptysis  CV: No chest pain, PND, orthopnea  GI: No abdominal pain, diarrhea, constipation, nausea, vomiting, melena, hematochezia  : No increased frequency, dysuria, hematuria, nocturia  MSK: No joint pain/swelling; no back pain; no edema  Neuro: No dizziness/lightheadedness, weakness, seizures, numbness, tingling  Heme: No easy bruising or bleeding  Endo: No heat/cold intolerance  Psych: No significant nervousness, anxiety, stress, depression    All other systems were reviewed and are negative, except as noted.    VITALS/PHYSICAL EXAM  --------------------------------------------------------------------------------  T(C): 37.6 (03-10-18 @ 16:00), Max: 37.6 (03-10-18 @ 16:00)  HR: 104 (03-10-18 @ 16:30) (78 - 107)  BP: --  RR: 33 (03-10-18 @ 16:30) (22 - 38)  SpO2: 100% (03-10-18 @ 16:30) (97% - 100%)  Wt(kg): --        03-09-18 @ 07:01  -  03-10-18 @ 07:00  --------------------------------------------------------  IN: 1360.9 mL / OUT: 2910 mL / NET: -1549.1 mL    03-10-18 @ 06:01  -  03-10-18 @ 16:47  --------------------------------------------------------  IN: 142.9 mL / OUT: 1502 mL / NET: -1359.1 mL      Physical Exam:  	Gen: int/sed  	HEENT: ETT+  	Pulm: vent BS  	CV: RRR, S1S2; no rub  	Back: No spinal or CVA tenderness; no sacral edema  	Abd: +BS, soft, nontender/nondistended  	: castro+  	LE: Warm, FROM, no clubbing, intact strength; no edema  	Skin: Warm, without rashes    LABS/STUDIES  --------------------------------------------------------------------------------              8.9    18.2  >-----------<  383      [03-10-18 @ 12:47]              26.9     135  |  87  |  40.0  ----------------------------<  128      [03-10-18 @ 12:47]  4.1   |  36.0  |  1.61        Ca     8.3     [03-10-18 @ 12:47]      Mg     2.2     [03-10-18 @ 12:47]      Phos  3.7     [03-10-18 @ 12:47]    TPro  6.6  /  Alb  2.5  /  TBili  0.3  /  DBili  x   /  AST  30  /  ALT  30  /  AlkPhos  97  [03-10-18 @ 12:47]          Creatinine Trend:  SCr 1.61 [03-10 @ 12:47]  SCr 1.53 [03-10 @ 03:41]  SCr 1.38 [03-09 @ 05:26]  SCr 1.35 [03-09 @ 00:50]  SCr 1.31 [03-08 @ 17:35]        HbA1c 5.5      [02-12-18 @ 10:54]  Lipid: chol --, , HDL --, LDL --      [03-10-18 @ 12:47]

## 2018-03-10 NOTE — PROGRESS NOTE ADULT - SUBJECTIVE AND OBJECTIVE BOX
Subjective:  57yMale POD#12 RObotic [place] marlon esophagectomy  postop aspiration subsequent pneumonitis w/ PEA arrest, intubated, profound mixed resp failure requiring Melchor, RENETTA,      no overnight events,   no history given paralzyed/sedated    Past Medical History:  Malignant neoplasm of esophagus  No h/o HF  Family history of prostate cancer in father (Father)  Handoff  Esophageal cancer  Weight loss, unintentional  Hodgkins lymphoma  Acid reflux disease  Esophageal cancer  Esophageal lesion  Acute respiratory failure with hypoxia and hypercapnia  Aspiration pneumonia of right middle lobe due to gastric secretions  Pulseless electrical activity  Gastroesophageal reflux disease without esophagitis  Cardiogenic shock  RENETTA (acute kidney injury)  Shock  ARDS (adult respiratory distress syndrome)  Cardiac arrest  Respiratory arrest  Immunosuppressed due to chemotherapy  Pseudomonas pneumonia  Serratia marcescens infection  Esophageal cancer  Constipation  Generalized abdominal pain  Leukopenia  SIRS (systemic inflammatory response syndrome)  Pneumonia, bacterial  Prophylactic measure  Jejunostomy tube in situ  Malignant neoplasm of esophagus, unspecified location  Post-operative pain  Acid reflux disease  Malignant neoplasm of esophagus, unspecified  Lymph node biopsy  Robot-assisted esophagectomy using da [employer] system  EGD  Jejunostomy feeding tube placement  No significant past surgical history  MALIGNANT NEOPLASM OF ESOPHAGU        artificial  tears Solution 1 Drop(s) Both EYES three times a day PRN  cefepime  IVPB      cefepime  IVPB 2000 milliGRAM(s) IV Intermittent every 8 hours  chlorhexidine 0.12% Liquid 15 milliLiter(s) Swish and Spit two times a day  cisatracurium Infusion 1 MICROgram(s)/kG/Min IV Continuous <Continuous>  enoxaparin Injectable 40 milliGRAM(s) SubCutaneous daily  furosemide Infusion 10 mG/Hr IV Continuous <Continuous>  methylPREDNISolone sodium succinate Injectable 30 milliGRAM(s) IV Push two times a day  midazolam Infusion 2 mG/Hr IV Continuous <Continuous>  norepinephrine Infusion 0.049 MICROgram(s)/kG/Min IV Continuous <Continuous>  pantoprazole  Injectable 40 milliGRAM(s) IV Push daily  propofol Infusion 10 MICROgram(s)/kG/Min IV Continuous <Continuous>  vancomycin  IVPB 1000 milliGRAM(s) IV Intermittent every 8 hours  MEDICATIONS  (PRN):  artificial  tears Solution 1 Drop(s) Both EYES three times a day PRN Dry Eyes    Mode: AC/ CMV (Assist Control/ Continuous Mandatory Ventilation), RR (machine): 35, TV (machine): 500, FiO2: 60, PEEP: 10, MAP: 17, PIP: 39  Daily     Daily       ABG - ( 09 Mar 2018 22:10 )  pH: 7.27  /  pCO2: 80    /  pO2: 118   / HCO3: 32    / Base Excess: 7.9   /  SaO2: 99                                      9.4    17.8  )-----------( 342      ( 09 Mar 2018 20:56 )             29.4   03-09    134<L>  |  94<L>  |  25.0<H>  ----------------------------<  208<H>  5.2   |  33.0<H>  |  1.38<H>    Ca    7.8<L>      09 Mar 2018 05:26  Phos  1.9     03-08  Mg     2.4     03-09    TPro  6.1<L>  /  Alb  2.5<L>  /  TBili  0.2<L>  /  DBili  x   /  AST  39  /  ALT  44<H>  /  AlkPhos  157<H>  03-08    CARDIAC MARKERS ( 08 Mar 2018 06:07 )  x     / <0.01 ng/mL / 31 U/L / x     / x      CARDIAC MARKERS ( 08 Mar 2018 03:56 )  x     / <0.01 ng/mL / 33 U/L / x     / x        PT/INR - ( 08 Mar 2018 10:33 )   PT: 14.2 sec;   INR: 1.28 ratio         PTT - ( 08 Mar 2018 10:33 )  PTT:29.1 sec      Objective:  T(C): 37.1 (03-09-18 @ 23:00), Max: 37.1 (03-09-18 @ 23:00)  HR: 96 (03-09-18 @ 23:49) (75 - 96)  BP: --  RR: 35 (03-09-18 @ 23:00) (22 - 60)  SpO2: 99% (03-09-18 @ 23:49) (94% - 100%)  Wt(kg): --CAPILLARY BLOOD GLUCOSE      I&O's Summary    08 Mar 2018 07:01  -  09 Mar 2018 07:00  --------------------------------------------------------  IN: 4729.1 mL / OUT: 2420 mL / NET: 2309.1 mL    09 Mar 2018 07:01  -  10 Mar 2018 00:25  --------------------------------------------------------  IN: 1145.5 mL / OUT: 1505 mL / NET: -359.5 mL        Physical Exam:  Neuro: paralyzed/sedated  Train of four 2/4  Pulm: coarse b/s b/l + ET  CV: s1/s2  no murmurs   Chest tubes x2  without airleak  Abd: soft   non distended  + SHELLI  Ext: warm, well perfsued no edmea  Inc: c/d/i        Assessment/Plan

## 2018-03-10 NOTE — PROGRESS NOTE ADULT - ASSESSMENT
57 year old Male POD# 10 robotic [place] marlon esophagectomy with post-operative stay complicated by dysphagia, diffuse abdominal discomfort with CT C/A/P on 3/3/18 performed ruling out leak, later febrile with leukopenia with sputum culture positive for serratia and pseudomonas seen by infectious disease and treated with Cefepime. 3/8/18 Patient had episode of tachypnea, tachycardia, hypotension and acute hypoxia, treated symptomatically with oxygen supplementation; PEA arrest s/p aspiration, requiring CPR x ~ 3 minutes and Epinephrine, intubated by anesthesia. ROSC, after with continued hypercapnia despite optimal vent treatment. Post-op bronchoscopy at the bedside showing small posterior tracheal hole distal to ETT tip otherwise airways without gastric content. Post-Bronchoscopy, difficulty oxygenating patient despite optimal vent settings, nitric added eventually uptitrated to 40 ppm. Progressive labile hypoxia and hypercapnia, MICU consult called and vent adjustments made, versed added, lasix gtt started. Concern for further need of mechanical support, rota prone bed vs VV ECMO? Overnight, ABGs monitored hourly, ETT exchanges for audible leak, leak improved.     Plan overnight:  ·	Neurology: Maintain paralytics per train for four 2/4, maintain current versed dose, titrate propofol as needed keeping in mind blood pressure changes with titration, neuro checks q hour  ·	Cardiac: titrate pressor for SBP > 110  ·	Pulmonary: Titrate FiO2, wean for SpO2 > 90%, maintain Melchor 20ppm, leave peep at 10, ARDS settings; (low Tv ventliaion, maintain pPlat <30) SHELLI bulb remains in place  ·	Renal: Creatinine uptrending, UO initially oliguric/anuric now responding to lasix gtt, keep K > 4, mag > 2 with frequent labs, may develop RENETTA secondary to hypoperfusion renal in contact, CVVH orders on hold  ·	GI: pending BM, monitor abdomen for signs of acute distension, abdominal xray showing contrast remaining in bowel, remains NPO  ·	Infectious disease: On cefepime for serration in the sputum, vancomycin added for aspiration pneumonia   ·	Hematology: HCT stable, WBC downtrending, platelet count stable, daily CBC

## 2018-03-10 NOTE — PROGRESS NOTE ADULT - ASSESSMENT
BRIEF HOSPITAL COURSE: 56 yo admitted electively for [place] Mehran esophagectomy for esophageal cancer on 2/26; had an aspiration event this AM and developed PEA arrest with difficulty oxygenating, ventilating, and shock.  Medical critical care service was consulted for severe ARDS management.    Better this am ventilation and oxygenation wise

## 2018-03-10 NOTE — PROGRESS NOTE ADULT - PROBLEM SELECTOR PLAN 1
Appreciate MICU recommendations  Vent settings at 30/50/10/60%  Titrate FiO2 to maintain SpO2 > 90%  Daily CXR  Hourly ABGs until CO2 stabilized

## 2018-03-10 NOTE — PROGRESS NOTE ADULT - PROBLEM SELECTOR PLAN 1
sputum culture with Serratia marcescens and Pseudomonas aeruginosa  Continue Cefepime due to sensitivities  Blood cultures no growth  Afebrile  Trend Leukocytosis  No change in cultures  D/C Vancomycin

## 2018-03-10 NOTE — PROGRESS NOTE ADULT - SUBJECTIVE AND OBJECTIVE BOX
Patient is a 57y old  Male who presents with a chief complaint of Esophageal Cancer (12 Feb 2018 10:28)      BRIEF HOSPITAL COURSE: 56 yo admitted electively for Yuma Mehran esophagectomy for esophageal cancer on 2/26; had an aspiration event this AM and developed PEA arrest with difficulty oxygenating, ventilating, and shock.  Medical critical care service was consulted for severe ARDS management. Persistant hypercarbia    Events last 24 hours: better ventilation this am, TV better, leak less evident    PAST MEDICAL & SURGICAL HISTORY:  Esophageal cancer  Weight loss, unintentional  Hodgkins lymphoma: 25 years ago  Acid reflux disease  No significant past surgical history      Review of Systems:  unable to obtain    Medications:  cefepime  IVPB      cefepime  IVPB 2000 milliGRAM(s) IV Intermittent every 8 hours    furosemide Infusion 10 mG/Hr IV Continuous <Continuous>      cisatracurium Infusion 1 MICROgram(s)/kG/Min IV Continuous <Continuous>  fentaNYL    Injectable 50 MICROGram(s) IV Push every 3 hours PRN  midazolam Infusion 2 mG/Hr IV Continuous <Continuous>  propofol Infusion 10 MICROgram(s)/kG/Min IV Continuous <Continuous>      enoxaparin Injectable 40 milliGRAM(s) SubCutaneous daily    bisacodyl Suppository 10 milliGRAM(s) Rectal daily PRN  pantoprazole  Injectable 40 milliGRAM(s) IV Push daily      methylPREDNISolone sodium succinate Injectable 30 milliGRAM(s) IV Push two times a day        artificial  tears Solution 1 Drop(s) Both EYES three times a day PRN  chlorhexidine 0.12% Liquid 15 milliLiter(s) Swish and Spit two times a day        Mode: AC/ CMV (Assist Control/ Continuous Mandatory Ventilation)  RR (machine): 30  TV (machine): 500  FiO2: 50  PEEP: 10  MAP: 17  PIP: 51      ICU Vital Signs Last 24 Hrs  T(C): 36.9 (10 Mar 2018 11:00), Max: 37.3 (10 Mar 2018 00:00)  T(F): 98.4 (10 Mar 2018 11:00), Max: 99.1 (10 Mar 2018 00:00)  HR: 96 (10 Mar 2018 11:58) (78 - 96)  BP: --  BP(mean): --  ABP: 146/70 (10 Mar 2018 11:30) (105/52 - 176/73)  ABP(mean): 92 (10 Mar 2018 11:30) (67 - 103)  RR: 33 (10 Mar 2018 11:30) (22 - 38)  SpO2: 98% (10 Mar 2018 11:58) (97% - 100%)      ABG - ( 10 Mar 2018 08:24 )  pH: 7.40  /  pCO2: 62    /  pO2: 92    / HCO3: 35    / Base Excess: 11.3  /  SaO2: 98                  I&O's Detail    09 Mar 2018 07:01  -  10 Mar 2018 07:00  --------------------------------------------------------  IN:    cisatracurium Infusion: 232.8 mL    furosemide Infusion: 50 mL    furosemide Infusion: 70 mL    midazolam Infusion: 48 mL    norepinephrine Infusion: 48.1 mL    propofol Infusion: 52 mL    sodium bicarbonate  Infusion: 160 mL    Solution: 500 mL    Solution: 200 mL  Total IN: 1360.9 mL    OUT:    Bulb: 90 mL    Indwelling Catheter - Urethral: 2720 mL    Other: 100 mL  Total OUT: 2910 mL    Total NET: -1549.1 mL      10 Mar 2018 06:01  -  10 Mar 2018 12:14  --------------------------------------------------------  IN:    cisatracurium Infusion: 9.7 mL    Enteral Tube Flush: 20 mL    furosemide Infusion: 20 mL    midazolam Infusion: 11 mL    Other: 10 mL    propofol Infusion: 16.3 mL  Total IN: 87 mL    OUT:    Indwelling Catheter - Urethral: 875 mL    Stool: 2 mL  Total OUT: 877 mL    Total NET: -790 mL            LABS:                        9.3    17.6  )-----------( 392      ( 10 Mar 2018 03:41 )             28.2     03-10    134<L>  |  87<L>  |  33.0<H>  ----------------------------<  113  4.8   |  33.0<H>  |  1.53<H>    Ca    8.2<L>      10 Mar 2018 03:41  Mg     2.3     03-10    TPro  6.5<L>  /  Alb  2.4<L>  /  TBili  0.2<L>  /  DBili  x   /  AST  30  /  ALT  32  /  AlkPhos  101  03-10          CAPILLARY BLOOD GLUCOSE            CULTURES:  Culture Results:   No growth (03-04-18 @ 05:44)  Culture Results:   Numerous Serratia marcescens  Numerous Pseudomonas aeruginosa  Numerous Routine respiratory marquis present (03-04-18 @ 02:47)  Culture Results:   No growth at 5 days. (03-04-18 @ 01:08)  Culture Results:   No growth at 5 days. (03-04-18 @ 01:07)      Physical Examination:    General: No acute distress. sedated     HEENT: Pupils equal, reactive to light.  Symmetric.    PULM: course but diminished    CVS: Regular rate and rhythm, no murmurs, rubs, or gallops    ABD: Soft, nondistended, nontender, normoactive bowel sounds, no masses    EXT: No edema, nontender    SKIN: Warm and well perfused, no rashes noted.    CRITICAL CARE TIME SPENT:  45 min

## 2018-03-10 NOTE — PROGRESS NOTE ADULT - ASSESSMENT
1) ATN  2) Oligo-anuria  3) Respiratory Acidosis  4) Hypotension    Patient is s/p PEA arrest with 4 minute CPR  Has ATN from poor perfusion;   Profound respiratory acidosis; pt vent up to 60 RR with Fi02 of 100%  Decrease lasix drip to 5mg/hr  Can start diamox 250mg daily for now  Follow ABG  d/w CTICU

## 2018-03-10 NOTE — PROGRESS NOTE ADULT - SUBJECTIVE AND OBJECTIVE BOX
INTERVAL HPI/OVERNIGHT EVENTS/SUBJECTIVE:  Pt seen and examined at bedside. Pt intubated, vitals stable, Recent ABG improved from previous, Improving resp acidosis, Vasopressin has been DC, still on Levophed, bicarb and lasix drip continuing. RENETTA.       ICU Vital Signs Last 24 Hrs  T(C): 36.9 (10 Mar 2018 08:00), Max: 37.3 (10 Mar 2018 00:00)  T(F): 98.4 (10 Mar 2018 08:00), Max: 99.1 (10 Mar 2018 00:00)  HR: 93 (10 Mar 2018 08:00) (75 - 96)  ABP: 176/73 (10 Mar 2018 08:00) (105/52 - 176/73)  ABP(mean): 103 (10 Mar 2018 08:00) (61 - 103)  RR: 36 (10 Mar 2018 08:00) (22 - 60)  SpO2: 99% (10 Mar 2018 08:00) (94% - 100%)      I&O's Detail    09 Mar 2018 07:01  -  10 Mar 2018 07:00  --------------------------------------------------------  IN:    cisatracurium Infusion: 232.8 mL    furosemide Infusion: 50 mL    furosemide Infusion: 70 mL    midazolam Infusion: 48 mL    norepinephrine Infusion: 48.1 mL    propofol Infusion: 52 mL    sodium bicarbonate  Infusion: 160 mL    Solution: 500 mL    Solution: 200 mL  Total IN: 1360.9 mL    OUT:    Bulb: 90 mL    Indwelling Catheter - Urethral: 2720 mL    Other: 100 mL  Total OUT: 2910 mL    Total NET: -1549.1 mL      10 Mar 2018 06:01  -  10 Mar 2018 08:15  --------------------------------------------------------  IN:    cisatracurium Infusion: 9.7 mL    furosemide Infusion: 10 mL    midazolam Infusion: 5 mL    propofol Infusion: 3.9 mL  Total IN: 28.6 mL    OUT:  Total OUT: 0 mL    Total NET: 28.6 mL          Mode: AC/ CMV (Assist Control/ Continuous Mandatory Ventilation)  RR (machine): 35  TV (machine): 500  FiO2: 60  PEEP: 10  MAP: 17  PIP: 38    ABG - ( 10 Mar 2018 04:37 )  pH: 7.38  /  pCO2: 64    /  pO2: 90    / HCO3: 35    / Base Excess: 11.2  /  SaO2: 98                  MEDICATIONS  (STANDING):  cefepime  IVPB      cefepime  IVPB 2000 milliGRAM(s) IV Intermittent every 8 hours  chlorhexidine 0.12% Liquid 15 milliLiter(s) Swish and Spit two times a day  cisatracurium Infusion 1 MICROgram(s)/kG/Min (3.24 mL/Hr) IV Continuous <Continuous>  enoxaparin Injectable 40 milliGRAM(s) SubCutaneous daily  furosemide Infusion 10 mG/Hr (5 mL/Hr) IV Continuous <Continuous>  methylPREDNISolone sodium succinate Injectable 30 milliGRAM(s) IV Push two times a day  midazolam Infusion 2 mG/Hr (2 mL/Hr) IV Continuous <Continuous>  norepinephrine Infusion 0.049 MICROgram(s)/kG/Min (5 mL/Hr) IV Continuous <Continuous>  pantoprazole  Injectable 40 milliGRAM(s) IV Push daily  propofol Infusion 10 MICROgram(s)/kG/Min (3.24 mL/Hr) IV Continuous <Continuous>  vancomycin  IVPB 1000 milliGRAM(s) IV Intermittent every 8 hours    MEDICATIONS  (PRN):  artificial  tears Solution 1 Drop(s) Both EYES three times a day PRN Dry Eyes    PHYSICAL EXAM:    Neuro: intubated, sedated, and paralyzed, PEERL  Pulm: improved air leak  CV: NSR, HD supported with Levophed  Abd: softly distended, no guarding or rebound.  Ext: SCD in place b/l, no peripheral edema        LABS:  CBC Full  -  ( 10 Mar 2018 03:41 )  WBC Count : 17.6 K/uL  Hemoglobin : 9.3 g/dL  Hematocrit : 28.2 %  Platelet Count - Automated : 392 K/uL  Mean Cell Volume : 89.2 fl  Mean Cell Hemoglobin : 29.4 pg  Mean Cell Hemoglobin Concentration : 33.0 g/dL  Auto Neutrophil # : x  Auto Lymphocyte # : x  Auto Monocyte # : x  Auto Eosinophil # : x  Auto Basophil # : x  Auto Neutrophil % : x  Auto Lymphocyte % : x  Auto Monocyte % : x  Auto Eosinophil % : x  Auto Basophil % : x    03-10    134<L>  |  87<L>  |  33.0<H>  ----------------------------<  113  4.8   |  33.0<H>  |  1.53<H>    Ca    8.2<L>      10 Mar 2018 03:41  Mg     2.3     03-10    TPro  6.5<L>  /  Alb  2.4<L>  /  TBili  0.2<L>  /  DBili  x   /  AST  30  /  ALT  32  /  AlkPhos  101  03-10    PT/INR - ( 08 Mar 2018 10:33 )   PT: 14.2 sec;   INR: 1.28 ratio         PTT - ( 08 Mar 2018 10:33 )  PTT:29.1 sec    RECENT CULTURES:  03-04 .Urine Clean Catch (Midstream) XXXX XXXX   No growth    03-04 .Sputum Sputum Serratia marcescens  Pseudomonas aeruginosa   Few White blood cells  Numerous Gram Negative Rods  Moderate Gram Positive Cocci in Pairs and Chains   Numerous Serratia marcescens  Numerous Pseudomonas aeruginosa  Numerous Routine respiratory marquis present    03-04 .Blood Blood-Peripheral XXXX XXXX   No growth at 5 days.    03-04 .Blood Blood-Peripheral XXXX XXXX   No growth at 5 days.        LIVER FUNCTIONS - ( 10 Mar 2018 03:41 )  Alb: 2.4 g/dL / Pro: 6.5 g/dL / ALK PHOS: 101 U/L / ALT: 32 U/L / AST: 30 U/L / GGT: x               CAPILLARY BLOOD GLUCOSE      RADIOLOGY & ADDITIONAL STUDIES:    ASSESSMENT/PLAN:  57yMale POD 11. s/p PEA arrest with ROSC yesterday. Improving acidosis, On levo, bicarb drip, sedated, paralyzed.   -optimize respiratory status, care per CT ICU   -strict I/O's  -NPO  -dvt ppx  -care per CT ICU surgery

## 2018-03-10 NOTE — PROGRESS NOTE ADULT - PROBLEM SELECTOR PLAN 2
oxygenation improved, would do low tidal volume ventilation for this patietn 6 mg/kg = 450ml and 7 mg/kg 525ml (currently on 500 ml) would try to keep plat. pressures <30 (would ck every 6 hours), if becomes alkalotic consider decreasing RR, if plat. pressures > 30 can decrease TV please check blood gas after each vent. changes

## 2018-03-10 NOTE — PROGRESS NOTE ADULT - SUBJECTIVE AND OBJECTIVE BOX
[facility]  INFECTIOUS DISEASES AND INTERNAL MEDICINE at [place]  =======================================================  Jere Galarza MD [facility]   Antony Spear MD  Diplomates American Board of Internal Medicine and Infectious Diseases  =======================================================    ROB [facility] [id number]    Follow up:  Pneumonia    Had Respiratory arrest 3/8/17was intubated placed on vent  Had bedside bronch with removal of about 500cc of tube feeds     Remains on vent  Sedated   No pressors       Allergies:  No Known Allergies      Antibiotics:    cefepime  IVPB 2000 milliGRAM(s) IV Intermittent every 8 hours  vancomycin  IVPB 1000 milliGRAM(s) IV Intermittent every 8 hours       REVIEW OF SYSTEMS:  Unable to obtain, patient is intubated on vent      Physical Exam:  Vital Signs Last 24 Hrs  T(C): 36.9 (10 Mar 2018 11:00), Max: 37.3 (10 Mar 2018 00:00)  T(F): 98.4 (10 Mar 2018 11:00), Max: 99.1 (10 Mar 2018 00:00)  HR: 96 (10 Mar 2018 11:58) (78 - 96)  ABP: 146/70 (10 Mar 2018 11:30) (105/52 - 176/73)  ABP(mean): 92 (10 Mar 2018 11:30) (67 - 103)  RR: 33 (10 Mar 2018 11:30) (22 - 38)  SpO2: 98% (10 Mar 2018 11:58) (97% - 100%)      GEN: sedated on vent  HEENT: normocephalic and atraumatic.  +NGT + ETT  NECK: Supple.  LUNGS: coarse BS B/L  HEART: Regular rate and rhythm  ABDOMEN: Soft, nontender, and nondistended.  Positive bowel sounds.    : + Garcia  EXTREMITIES: Without any edema.  MSK: No joint swelling  NEUROLOGIC: Sedated  PSYCHIATRIC: Unable to obtain, patient sedated   SKIN: No rash      Labs:  03-10    134<L>  |  87<L>  |  33.0<H>  ----------------------------<  113  4.8   |  33.0<H>  |  1.53<H>    Ca    8.2<L>      10 Mar 2018 03:41  Mg     2.3     03-10    TPro  6.5<L>  /  Alb  2.4<L>  /  TBili  0.2<L>  /  DBili  x   /  AST  30  /  ALT  32  /  AlkPhos  101  03-10                          9.3    17.6  )-----------( 392      ( 10 Mar 2018 03:41 )             28.2       LIVER FUNCTIONS - ( 10 Mar 2018 03:41 )  Alb: 2.4 g/dL / Pro: 6.5 g/dL / ALK PHOS: 101 U/L / ALT: 32 U/L / AST: 30 U/L / GGT: x           ABG - ( 10 Mar 2018 08:24 )  pH: 7.40  /  pCO2: 62    /  pO2: 92    / HCO3: 35    / Base Excess: 11.3  /  SaO2: 98          RECENT CULTURES:  03-04 @ 05:44 .Urine Clean Catch (Midstream)     No growth      03-04 @ 02:47 .Sputum Sputum Serratia marcescens  Pseudomonas aeruginosa    Numerous Serratia marcescens  Numerous Pseudomonas aeruginosa  Numerous Routine respiratory marquis present  Few White blood cells  Numerous Gram Negative Rods  Moderate Gram Positive Cocci in Pairs and Chains      03-04 @ 01:08 .Blood Blood-Peripheral     No growth at 5 days.      03-04 @ 01:07 .Blood Blood-Peripheral     No growth at 5 days.

## 2018-03-11 LAB
ANION GAP SERPL CALC-SCNC: 11 MMOL/L — SIGNIFICANT CHANGE UP (ref 5–17)
BUN SERPL-MCNC: 48 MG/DL — HIGH (ref 8–20)
CALCIUM SERPL-MCNC: 8.4 MG/DL — LOW (ref 8.6–10.2)
CHLORIDE SERPL-SCNC: 90 MMOL/L — LOW (ref 98–107)
CO2 SERPL-SCNC: 37 MMOL/L — HIGH (ref 22–29)
CREAT SERPL-MCNC: 1.76 MG/DL — HIGH (ref 0.5–1.3)
GAS PNL BLDA: SIGNIFICANT CHANGE UP
GLUCOSE SERPL-MCNC: 122 MG/DL — HIGH (ref 70–115)
HCT VFR BLD CALC: 24.5 % — LOW (ref 42–52)
HGB BLD-MCNC: 8.4 G/DL — LOW (ref 14–18)
MAGNESIUM SERPL-MCNC: 2.6 MG/DL — SIGNIFICANT CHANGE UP (ref 1.6–2.6)
MCHC RBC-ENTMCNC: 29.2 PG — SIGNIFICANT CHANGE UP (ref 27–31)
MCHC RBC-ENTMCNC: 34.3 G/DL — SIGNIFICANT CHANGE UP (ref 32–36)
MCV RBC AUTO: 85.1 FL — SIGNIFICANT CHANGE UP (ref 80–94)
PLATELET # BLD AUTO: 432 K/UL — HIGH (ref 150–400)
POTASSIUM SERPL-MCNC: 4.4 MMOL/L — SIGNIFICANT CHANGE UP (ref 3.5–5.3)
POTASSIUM SERPL-SCNC: 4.4 MMOL/L — SIGNIFICANT CHANGE UP (ref 3.5–5.3)
PREALB SERPL-MCNC: 5 MG/DL — LOW (ref 18–38)
RBC # BLD: 2.88 M/UL — LOW (ref 4.6–6.2)
RBC # FLD: 15 % — SIGNIFICANT CHANGE UP (ref 11–15.6)
SODIUM SERPL-SCNC: 138 MMOL/L — SIGNIFICANT CHANGE UP (ref 135–145)
WBC # BLD: 19 K/UL — HIGH (ref 4.8–10.8)
WBC # FLD AUTO: 19 K/UL — HIGH (ref 4.8–10.8)

## 2018-03-11 PROCEDURE — 93971 EXTREMITY STUDY: CPT | Mod: 26,RT

## 2018-03-11 PROCEDURE — 71045 X-RAY EXAM CHEST 1 VIEW: CPT | Mod: 26,76

## 2018-03-11 PROCEDURE — 99233 SBSQ HOSP IP/OBS HIGH 50: CPT

## 2018-03-11 PROCEDURE — 99291 CRITICAL CARE FIRST HOUR: CPT

## 2018-03-11 PROCEDURE — 71045 X-RAY EXAM CHEST 1 VIEW: CPT | Mod: 26,77

## 2018-03-11 RX ORDER — MIDAZOLAM HYDROCHLORIDE 1 MG/ML
1 INJECTION, SOLUTION INTRAMUSCULAR; INTRAVENOUS ONCE
Qty: 0 | Refills: 0 | Status: DISCONTINUED | OUTPATIENT
Start: 2018-03-11 | End: 2018-03-11

## 2018-03-11 RX ORDER — FUROSEMIDE 40 MG
20 TABLET ORAL ONCE
Qty: 0 | Refills: 0 | Status: COMPLETED | OUTPATIENT
Start: 2018-03-11 | End: 2018-03-11

## 2018-03-11 RX ORDER — DILTIAZEM HCL 120 MG
7.5 CAPSULE, EXT RELEASE 24 HR ORAL
Qty: 125 | Refills: 0 | Status: DISCONTINUED | OUTPATIENT
Start: 2018-03-11 | End: 2018-03-12

## 2018-03-11 RX ORDER — AMIODARONE HYDROCHLORIDE 400 MG/1
150 TABLET ORAL ONCE
Qty: 0 | Refills: 0 | Status: COMPLETED | OUTPATIENT
Start: 2018-03-11 | End: 2018-03-11

## 2018-03-11 RX ORDER — METOPROLOL TARTRATE 50 MG
2.5 TABLET ORAL ONCE
Qty: 0 | Refills: 0 | Status: COMPLETED | OUTPATIENT
Start: 2018-03-11 | End: 2018-03-11

## 2018-03-11 RX ORDER — SODIUM CHLORIDE 9 MG/ML
250 INJECTION, SOLUTION INTRAVENOUS ONCE
Qty: 0 | Refills: 0 | Status: COMPLETED | OUTPATIENT
Start: 2018-03-11 | End: 2018-03-11

## 2018-03-11 RX ORDER — HYDROMORPHONE HYDROCHLORIDE 2 MG/ML
0.5 INJECTION INTRAMUSCULAR; INTRAVENOUS; SUBCUTANEOUS ONCE
Qty: 0 | Refills: 0 | Status: DISCONTINUED | OUTPATIENT
Start: 2018-03-11 | End: 2018-03-11

## 2018-03-11 RX ORDER — LEVALBUTEROL 1.25 MG/.5ML
1.25 SOLUTION, CONCENTRATE RESPIRATORY (INHALATION) EVERY 6 HOURS
Qty: 0 | Refills: 0 | Status: DISCONTINUED | OUTPATIENT
Start: 2018-03-11 | End: 2018-03-13

## 2018-03-11 RX ORDER — FENTANYL CITRATE 50 UG/ML
100 INJECTION INTRAVENOUS ONCE
Qty: 0 | Refills: 0 | Status: DISCONTINUED | OUTPATIENT
Start: 2018-03-11 | End: 2018-03-11

## 2018-03-11 RX ORDER — INSULIN LISPRO 100/ML
VIAL (ML) SUBCUTANEOUS EVERY 6 HOURS
Qty: 0 | Refills: 0 | Status: DISCONTINUED | OUTPATIENT
Start: 2018-03-11 | End: 2018-03-12

## 2018-03-11 RX ORDER — FENTANYL CITRATE 50 UG/ML
50 INJECTION INTRAVENOUS ONCE
Qty: 0 | Refills: 0 | Status: DISCONTINUED | OUTPATIENT
Start: 2018-03-11 | End: 2018-03-11

## 2018-03-11 RX ORDER — POTASSIUM CHLORIDE 20 MEQ
10 PACKET (EA) ORAL
Qty: 0 | Refills: 0 | Status: COMPLETED | OUTPATIENT
Start: 2018-03-11 | End: 2018-03-11

## 2018-03-11 RX ORDER — MIDAZOLAM HYDROCHLORIDE 1 MG/ML
2 INJECTION, SOLUTION INTRAMUSCULAR; INTRAVENOUS ONCE
Qty: 0 | Refills: 0 | Status: DISCONTINUED | OUTPATIENT
Start: 2018-03-11 | End: 2018-03-11

## 2018-03-11 RX ADMIN — FENTANYL CITRATE 50 MICROGRAM(S): 50 INJECTION INTRAVENOUS at 18:22

## 2018-03-11 RX ADMIN — FENTANYL CITRATE 50 MICROGRAM(S): 50 INJECTION INTRAVENOUS at 08:35

## 2018-03-11 RX ADMIN — Medication 50 MILLIEQUIVALENT(S): at 10:48

## 2018-03-11 RX ADMIN — Medication 5 MG/HR: at 13:14

## 2018-03-11 RX ADMIN — ACETAZOLAMIDE 105 MILLIGRAM(S): 250 TABLET ORAL at 01:13

## 2018-03-11 RX ADMIN — Medication 1 DROP(S): at 04:55

## 2018-03-11 RX ADMIN — ENOXAPARIN SODIUM 40 MILLIGRAM(S): 100 INJECTION SUBCUTANEOUS at 12:04

## 2018-03-11 RX ADMIN — SODIUM CHLORIDE 1000 MILLILITER(S): 9 INJECTION, SOLUTION INTRAVENOUS at 10:45

## 2018-03-11 RX ADMIN — Medication 2.5 MILLIGRAM(S): at 11:30

## 2018-03-11 RX ADMIN — HYDROMORPHONE HYDROCHLORIDE 0.5 MILLIGRAM(S): 2 INJECTION INTRAMUSCULAR; INTRAVENOUS; SUBCUTANEOUS at 19:35

## 2018-03-11 RX ADMIN — FENTANYL CITRATE 100 MICROGRAM(S): 50 INJECTION INTRAVENOUS at 10:00

## 2018-03-11 RX ADMIN — CEFEPIME 100 MILLIGRAM(S): 1 INJECTION, POWDER, FOR SOLUTION INTRAMUSCULAR; INTRAVENOUS at 18:17

## 2018-03-11 RX ADMIN — Medication 50 MILLIEQUIVALENT(S): at 09:42

## 2018-03-11 RX ADMIN — FENTANYL CITRATE 50 MICROGRAM(S): 50 INJECTION INTRAVENOUS at 04:48

## 2018-03-11 RX ADMIN — FENTANYL CITRATE 50 MICROGRAM(S): 50 INJECTION INTRAVENOUS at 05:17

## 2018-03-11 RX ADMIN — Medication 10 MILLIGRAM(S): at 17:38

## 2018-03-11 RX ADMIN — FENTANYL CITRATE 50 MICROGRAM(S): 50 INJECTION INTRAVENOUS at 21:32

## 2018-03-11 RX ADMIN — CHLORHEXIDINE GLUCONATE 15 MILLILITER(S): 213 SOLUTION TOPICAL at 18:17

## 2018-03-11 RX ADMIN — FENTANYL CITRATE 50 MICROGRAM(S): 50 INJECTION INTRAVENOUS at 18:45

## 2018-03-11 RX ADMIN — Medication 50 MILLIEQUIVALENT(S): at 10:18

## 2018-03-11 RX ADMIN — Medication 100 MILLIEQUIVALENT(S): at 03:53

## 2018-03-11 RX ADMIN — Medication 30 MILLIGRAM(S): at 05:19

## 2018-03-11 RX ADMIN — MIDAZOLAM HYDROCHLORIDE 2 MILLIGRAM(S): 1 INJECTION, SOLUTION INTRAMUSCULAR; INTRAVENOUS at 10:45

## 2018-03-11 RX ADMIN — PANTOPRAZOLE SODIUM 40 MILLIGRAM(S): 20 TABLET, DELAYED RELEASE ORAL at 12:04

## 2018-03-11 RX ADMIN — PROPOFOL 3.24 MICROGRAM(S)/KG/MIN: 10 INJECTION, EMULSION INTRAVENOUS at 10:19

## 2018-03-11 RX ADMIN — FENTANYL CITRATE 50 MICROGRAM(S): 50 INJECTION INTRAVENOUS at 04:56

## 2018-03-11 RX ADMIN — Medication 100 MILLIEQUIVALENT(S): at 01:32

## 2018-03-11 RX ADMIN — PROPOFOL 3.24 MICROGRAM(S)/KG/MIN: 10 INJECTION, EMULSION INTRAVENOUS at 04:53

## 2018-03-11 RX ADMIN — Medication 20 MILLIGRAM(S): at 16:02

## 2018-03-11 RX ADMIN — HYDROMORPHONE HYDROCHLORIDE 0.5 MILLIGRAM(S): 2 INJECTION INTRAMUSCULAR; INTRAVENOUS; SUBCUTANEOUS at 19:45

## 2018-03-11 RX ADMIN — CHLORHEXIDINE GLUCONATE 15 MILLILITER(S): 213 SOLUTION TOPICAL at 05:18

## 2018-03-11 RX ADMIN — FENTANYL CITRATE 50 MICROGRAM(S): 50 INJECTION INTRAVENOUS at 03:53

## 2018-03-11 RX ADMIN — AMIODARONE HYDROCHLORIDE 618 MILLIGRAM(S): 400 TABLET ORAL at 21:49

## 2018-03-11 RX ADMIN — Medication 7.5 MG/HR: at 23:12

## 2018-03-11 RX ADMIN — FENTANYL CITRATE 100 MICROGRAM(S): 50 INJECTION INTRAVENOUS at 10:15

## 2018-03-11 RX ADMIN — MIDAZOLAM HYDROCHLORIDE 1 MILLIGRAM(S): 1 INJECTION, SOLUTION INTRAMUSCULAR; INTRAVENOUS at 10:38

## 2018-03-11 RX ADMIN — PROPOFOL 3.24 MICROGRAM(S)/KG/MIN: 10 INJECTION, EMULSION INTRAVENOUS at 15:44

## 2018-03-11 RX ADMIN — CEFEPIME 100 MILLIGRAM(S): 1 INJECTION, POWDER, FOR SOLUTION INTRAMUSCULAR; INTRAVENOUS at 05:18

## 2018-03-11 RX ADMIN — FENTANYL CITRATE 50 MICROGRAM(S): 50 INJECTION INTRAVENOUS at 21:45

## 2018-03-11 RX ADMIN — Medication 100 MILLIEQUIVALENT(S): at 00:56

## 2018-03-11 RX ADMIN — FENTANYL CITRATE 100 MICROGRAM(S): 50 INJECTION INTRAVENOUS at 10:43

## 2018-03-11 RX ADMIN — FENTANYL CITRATE 100 MICROGRAM(S): 50 INJECTION INTRAVENOUS at 10:28

## 2018-03-11 RX ADMIN — PROPOFOL 3.24 MICROGRAM(S)/KG/MIN: 10 INJECTION, EMULSION INTRAVENOUS at 21:41

## 2018-03-11 RX ADMIN — FENTANYL CITRATE 50 MICROGRAM(S): 50 INJECTION INTRAVENOUS at 08:20

## 2018-03-11 NOTE — PROGRESS NOTE ADULT - PROBLEM SELECTOR PLAN 1
hypoxemia markedly improved, continue slow weaning of NO off as tolerated  once NO off can down titrate FiO2 to maintain SpO2 > 90%  Daily CXR  ABGs PRN  if alkalemia will decrease RR though pt overbreathing (may require increase in sedation)

## 2018-03-11 NOTE — PROGRESS NOTE ADULT - SUBJECTIVE AND OBJECTIVE BOX
[facility]  INFECTIOUS DISEASES AND INTERNAL MEDICINE at [place]  =======================================================  Jere Spear MD  Diplomates American Board of Internal Medicine and Infectious Diseases  =======================================================    ROB [facility] [id number]    Follow up:  Pneumonia    Had Respiratory arrest 3/8/17was intubated placed on vent  Had bedside bronch with removal of about 500cc of tube feeds     Remains on vent  Sedated   No pressors   Afebrile      Allergies:  No Known Allergies      Antibiotics:    cefepime  IVPB 2000 milliGRAM(s) IV Intermittent every 12 hours       REVIEW OF SYSTEMS:  Unable to obtain, patient is intubated on vent      Physical Exam:  Vital Signs Last 24 Hrs  T(C): 36.7 (11 Mar 2018 08:00), Max: 37.7 (10 Mar 2018 17:00)  T(F): 98.1 (11 Mar 2018 08:00), Max: 99.9 (10 Mar 2018 17:00)  HR: 85 (11 Mar 2018 09:00) (82 - 107)  RR: 28 (11 Mar 2018 09:00) (21 - 36)  SpO2: 100% (11 Mar 2018 09:00) (83% - 100%)      GEN: sedated on vent  HEENT: normocephalic and atraumatic.  +NGT + ETT  NECK: Supple.  LUNGS: coarse BS B/L  HEART: Regular rate and rhythm  ABDOMEN: Soft, nontender, and nondistended.  Positive bowel sounds.    : + Garcia  EXTREMITIES: Without any edema.  MSK: No joint swelling  NEUROLOGIC: Sedated  PSYCHIATRIC: Unable to obtain, patient sedated   SKIN: No rash      Labs:  03-11    138  |  90<L>  |  48.0<H>  ----------------------------<  122<H>  4.4   |  37.0<H>  |  1.76<H>    Ca    8.4<L>      11 Mar 2018 04:34  Phos  3.7     03-10  Mg     2.6     03-11    TPro  6.6  /  Alb  2.5<L>  /  TBili  0.3<L>  /  DBili  x   /  AST  30  /  ALT  30  /  AlkPhos  97  03-10                 8.4    19.0  )-----------( 432      ( 11 Mar 2018 04:34 )             24.5     LIVER FUNCTIONS - ( 10 Mar 2018 12:47 )  Alb: 2.5 g/dL / Pro: 6.6 g/dL / ALK PHOS: 97 U/L / ALT: 30 U/L / AST: 30 U/L / GGT: x             ABG - ( 11 Mar 2018 09:02 )  pH: 7.41  /  pCO2: 58    /  pO2: 121   / HCO3: 35    / Base Excess: 10.8  /  SaO2: 99          RECENT CULTURES:  03-08 @ 16:14 .Blood Blood     No growth at 48 hours      03-08 @ 16:13 .Blood Blood     No growth at 48 hours      03-04 @ 05:44 .Urine Clean Catch (Midstream)     No growth      03-04 @ 02:47 .Sputum Sputum Serratia marcescens  Pseudomonas aeruginosa    Numerous Serratia marcescens  Numerous Pseudomonas aeruginosa  Numerous Routine respiratory marquis present  Few White blood cells  Numerous Gram Negative Rods  Moderate Gram Positive Cocci in Pairs and Chains      03-04 @ 01:08 .Blood Blood-Peripheral     No growth at 5 days.      03-04 @ 01:07 .Blood Blood-Peripheral     No growth at 5 days.

## 2018-03-11 NOTE — PROGRESS NOTE ADULT - SUBJECTIVE AND OBJECTIVE BOX
INTERVAL HPI/OVERNIGHT EVENTS/SUBJECTIVE: Pt seen and examined at bedside. Pt intubated, vitals stable, Recent ABG improved from previous, Continuing diuresis lactate improved.        ICU Vital Signs Last 24 Hrs  T(C): 36.7 (11 Mar 2018 08:00), Max: 37.7 (10 Mar 2018 17:00)  T(F): 98.1 (11 Mar 2018 08:00), Max: 99.9 (10 Mar 2018 17:00)  HR: 90 (11 Mar 2018 08:00) (82 - 107)  BP: --  BP(mean): --  ABP: 163/75 (11 Mar 2018 08:00) (124/58 - 175/72)  ABP(mean): 105 (11 Mar 2018 08:00) (78 - 105)  RR: 33 (11 Mar 2018 08:00) (21 - 38)  SpO2: 100% (11 Mar 2018 08:00) (83% - 100%)      I&O's Detail    10 Mar 2018 06:01  -  11 Mar 2018 07:00  --------------------------------------------------------  IN:    cisatracurium Infusion: 9.7 mL    Enteral Tube Flush: 80 mL    furosemide Infusion: 35 mL    furosemide Infusion: 20 mL    midazolam Infusion: 18 mL    Other: 50 mL    propofol Infusion: 47.9 mL    Solution: 300 mL    Solution: 100 mL  Total IN: 660.6 mL    OUT:    Bulb: 30 mL    Indwelling Catheter - Urethral: 3255 mL    Other: 20 mL    Stool: 5 mL  Total OUT: 3310 mL    Total NET: -2649.4 mL          Mode: AC/ CMV (Assist Control/ Continuous Mandatory Ventilation)  RR (machine): 30  TV (machine): 480  FiO2: 50  PEEP: 10  MAP: 16  PIP: 35    ABG - ( 11 Mar 2018 06:08 )  pH: 7.38  /  pCO2: 64    /  pO2: 133   / HCO3: 35    / Base Excess: 11.0  /  SaO2: 99                  MEDICATIONS  (STANDING):  cefepime  IVPB 2000 milliGRAM(s) IV Intermittent every 12 hours  chlorhexidine 0.12% Liquid 15 milliLiter(s) Swish and Spit two times a day  enoxaparin Injectable 40 milliGRAM(s) SubCutaneous daily  pantoprazole  Injectable 40 milliGRAM(s) IV Push daily  propofol Infusion 10 MICROgram(s)/kG/Min (3.24 mL/Hr) IV Continuous <Continuous>    MEDICATIONS  (PRN):  artificial  tears Solution 1 Drop(s) Both EYES three times a day PRN Dry Eyes  bisacodyl Suppository 10 milliGRAM(s) Rectal daily PRN Constipation  fentaNYL    Injectable 50 MICROGram(s) IV Push every 3 hours PRN sedation off paralytic      PHYSICAL EXAM:  Neuro: intubated, sedated, PEERL  Pulm: Breath sounds BL, vent assisted  CV: NSR  Abd: softly, no guarding or rebound.  Ext: SCD in place b/l, no peripheral edema besides distal RUE          LABS:  CBC Full  -  ( 11 Mar 2018 04:34 )  WBC Count : 19.0 K/uL  Hemoglobin : 8.4 g/dL  Hematocrit : 24.5 %  Platelet Count - Automated : 432 K/uL  Mean Cell Volume : 85.1 fl  Mean Cell Hemoglobin : 29.2 pg  Mean Cell Hemoglobin Concentration : 34.3 g/dL    03-11    138  |  90<L>  |  48.0<H>  ----------------------------<  122<H>  4.4   |  37.0<H>  |  1.76<H>    Ca    8.4<L>      11 Mar 2018 04:34  Phos  3.7     03-10  Mg     2.6     03-11    TPro  6.6  /  Alb  2.5<L>  /  TBili  0.3<L>  /  DBili  x   /  AST  30  /  ALT  30  /  AlkPhos  97  03-10        RECENT CULTURES:  03-08 .Blood Blood XXXX XXXX   No growth at 48 hours    03-08 .Blood Blood XXXX XXXX   No growth at 48 hours        LIVER FUNCTIONS - ( 10 Mar 2018 12:47 )  Alb: 2.5 g/dL / Pro: 6.6 g/dL / ALK PHOS: 97 U/L / ALT: 30 U/L / AST: 30 U/L / GGT: x               CAPILLARY BLOOD GLUCOSE      RADIOLOGY & ADDITIONAL STUDIES:    ASSESSMENT/PLAN:  57yMale POD 12. Intubated, vitals stable, Recent ABG improved from previous, Continuing diuresis lactate improved.    -optimize respiratory status, care per CT ICU   -strict I/O's  -NPO  -dvt ppx  -care per CT ICU surgery

## 2018-03-11 NOTE — PROGRESS NOTE ADULT - SUBJECTIVE AND OBJECTIVE BOX
Brief Hospital Course:   2/26 s/p robotic Yfn Mehran Esophagogastrectomy and jejunostomy tube placement.   2/27 J tube placement confirmed on gastrograffin study  2/28 Right pleural chest tube dressing saturated, removed at bedside, likely leaking around tube site without obvious drainage when evaluated.   3/1 CXR ? change in right pleural space, CT chest done without significant findings as per Dr. Blanco, NGT secured to suction, CXR improved, patient feeling better   3/3 - worsening abdominal pain, CT chest suspicious for leak, some free IP air as expected after abdominal surgery, less than on CT performed 3/1, CT Abd- no acute abdominal findings on preliminary report.    3/4 Gram negative rods in sputum febrile to 101.9 on vanc zosyn   3/5 ID consulted, presumed PNA, zosyn continues, vanco d/c'd  3/6 MBS done, cleared for dysphagia 3 with nectar thick liquids  3/8 aspiration event resulting in PEA arrest, ACLS started, CPR x 4 minutes before ROSC, bedside bronch (small hole in trachea per Dr Blanco), hypercapnia, refractory hypoxemia requiring NO  3/9 reintubated with 9.0 ETT (from 8.0), vent settings adjusted with significant improvement in hypercapnic respiratory failure/acidemia    Significant recent/past 24 hr events: nimbex and versed d/c'd, lasix infusion decreased then d/c'd, diamox given, further weaning of NO    Subjective/ROS: unable to obtain (intubated & sedated)      Patient is a 57y old  Male who presents with a chief complaint of Esophageal Cancer (12 Feb 2018 10:28)    HPI:  Pt is a 57 y.o male with esophageal cancer diagnosed 5 months ago with recent unintentional weight loss of about 40 lbs now scheduled for esophagogastroduodenoscopy (EGD) Robotic Yfn Mehran esophagogastrectomy. (12 Feb 2018 09:27)    PAST MEDICAL & SURGICAL HISTORY:  Esophageal cancer  Weight loss, unintentional  Hodgkins lymphoma: 25 years ago  Acid reflux disease  No significant past surgical history    FAMILY HISTORY:  Family history of prostate cancer in father (Father)      Vitals   ICU Vital Signs Last 24 Hrs  T(C): 36.9 (11 Mar 2018 00:00), Max: 37.7 (10 Mar 2018 17:00)  T(F): 98.4 (11 Mar 2018 00:00), Max: 99.9 (10 Mar 2018 17:00)  HR: 93 (11 Mar 2018 03:33) (86 - 107), NSR  ABP: 155/70 (11 Mar 2018 01:00) (131/60 - 176/73)  ABP(mean): 95 (11 Mar 2018 01:00) (78 - 103)  RR: 22 (11 Mar 2018 01:00) (21 - 38)  SpO2: 98% (11 Mar 2018 03:33) (83% - 100%)      VENT SETTINGS   Mode: AC/ CMV (Assist Control/ Continuous Mandatory Ventilation)  RR (machine): 30  TV (machine): 480  FiO2: 50  PEEP: 10  MAP: 16  PIP: 35  iNitric Oxide: 2 PPM    ABG - ( 10 Mar 2018 23:26 )  pH: 7.48  /  pCO2: 52    /  pO2: 112   / HCO3: 37    / Base Excess: 13.5  /  SaO2: 99            I&O's Detail    09 Mar 2018 07:01  -  10 Mar 2018 07:00  --------------------------------------------------------  IN:    cisatracurium Infusion: 232.8 mL    furosemide Infusion: 50 mL    furosemide Infusion: 70 mL    midazolam Infusion: 48 mL    norepinephrine Infusion: 48.1 mL    propofol Infusion: 52 mL    sodium bicarbonate  Infusion: 160 mL    Solution: 200 mL    Solution: 500 mL  Total IN: 1360.9 mL    OUT:    Bulb: 90 mL    Indwelling Catheter - Urethral: 2720 mL    Other: 100 mL  Total OUT: 2910 mL    Total NET: -1549.1 mL      10 Mar 2018 06:01  -  11 Mar 2018 04:16  --------------------------------------------------------  IN:    cisatracurium Infusion: 9.7 mL    Enteral Tube Flush: 80 mL    furosemide Infusion: 20 mL    furosemide Infusion: 35 mL    midazolam Infusion: 18 mL    Other: 30 mL    propofol Infusion: 47.9 mL    Solution: 50 mL    Solution: 200 mL  Total IN: 490.6 mL    OUT:    Bulb: 10 mL    Indwelling Catheter - Urethral: 2930 mL    Stool: 4   Total OUT: 2944 mL    Total NET: -2453.4 mL      LABS                        8.9    18.2  )-----------( 383      ( 10 Mar 2018 12:47 )             26.9     03-10    135  |  87<L>  |  40.0<H>  ----------------------------<  128<H>  4.1   |  36.0<H>  |  1.61<H>    Ca    8.3<L>      10 Mar 2018 12:47  Phos  3.7     03-10  Mg     2.2     03-10  TPro  6.6  /  Alb  2.5<L>  /  TBili  0.3<L>  /  DBili  x   /  AST  30  /  ALT  30  /  AlkPhos  97  03-10    Culture - Blood in AM (03.08.18 @ 16:14)    Specimen Source: .Blood Blood    Culture Results:   No growth at 48 hours    Culture - Blood in AM (03.08.18 @ 16:13)    Specimen Source: .Blood Blood    Culture Results:   No growth at 48 hours    Culture - Urine (03.04.18 @ 05:44)    Specimen Source: .Urine Clean Catch (Midstream)    Culture Results:   No growth    Culture - Sputum . (03.04.18 @ 02:47)     Culture Results:   Numerous Serratia marcescens  Numerous Pseudomonas aeruginosa  Numerous Routine respiratory marquis present         < from: TTE Echo Complete w/Doppler (03.08.18 @ 10:21) >  Summary:   1. Left ventricular ejection fraction, by visual estimation, is 60 to 65%.   2. Normal global left ventricular systolic function.   3. Large pleural effusion in the left lateral region.   4. Trivial pericardial effusion.   5. Mild-moderate tricuspid regurgitation.  < end of copied text >        MEDICATIONS  (STANDING):  cefepime  IVPB 2000 milliGRAM(s) IV Intermittent every 12 hours  chlorhexidine 0.12% Liquid 15 milliLiter(s) Swish and Spit two times a day  enoxaparin Injectable 40 milliGRAM(s) SubCutaneous daily  furosemide Infusion 5 mG/Hr (2.5 mL/Hr) IV Continuous <Continuous>  methylPREDNISolone sodium succinate Injectable 30 milliGRAM(s) IV Push two times a day  pantoprazole  Injectable 40 milliGRAM(s) IV Push daily  propofol Infusion 10 MICROgram(s)/kG/Min (3.24 mL/Hr) IV Continuous <Continuous>    MEDICATIONS  (PRN):  artificial  tears Solution 1 Drop(s) Both EYES three times a day PRN Dry Eyes  bisacodyl Suppository 10 milliGRAM(s) Rectal daily PRN Constipation  fentaNYL    Injectable 50 MICROGram(s) IV Push every 3 hours PRN sedation off paralytic      Allergies:  No Known Allergies        Physical Exam:   Constitutional: thin, ill appearing  HEENT: + ETT, + NGT, copious oral secretions  Neck: + RIJ TLC site C/D/I   Respiratory: Breath Sounds equal & clear bilaterally to auscultation, vent assisted  Cardiovascular: Regular rate, regular rhythm, normal S1, S2; no murmurs or rub  Chest: + LSC TLC site C/D/I, right CT to bulb suction, thoracotomy C/D/I without heat or redness  Abdomen/GI: Soft, non-tender, non distended, hyperactive bowel sounds, + J-tube. port holes x 5 C/D/I with steris, no heat or redness  : castro in situ to gravity drainage  Extremities: no peripheral edema except distal RUE/right hand, no cyanosis, no clubbing.   Vascular: Equal and normal pulses: 2+ peripheral pulses throughout  Neurological: sedated, arouses with sedation vacation (weakly moves right hand and both feet but not left hand)  Psychiatric: sedated  Skin: warm, dry, well perfused, no rashes      Critical care time spent: __40__minutes (reviewing chart including medication, labs and imaging results, discussions with interdisciplinary team, discussing goals of care/advanced directives, counseling patient and/or family, non-inclusive of procedures)

## 2018-03-11 NOTE — PROGRESS NOTE ADULT - PROBLEM SELECTOR PLAN 1
sputum culture with Serratia marcescens and Pseudomonas aeruginosa  Continue Cefepime due to sensitivities  Blood cultures no growth  Afebrile  Trend Leukocytosis  No change in cultures

## 2018-03-11 NOTE — PROGRESS NOTE ADULT - ASSESSMENT
1) ATN  2) Oligo-anuria  3) Respiratory Acidosis  4) Hypotension    Patient is s/p PEA arrest with 4 minute CPR  Has ATN from poor perfusion;   Profound respiratory acidosis;   Off lasix drip  Follow I/O  Net negative  Follow ABG  d/w CTICU

## 2018-03-11 NOTE — PROGRESS NOTE ADULT - ASSESSMENT
57 year old Male with PMH  Hodgkin's lymphoma, GERD, esophageal cancer.   2/26 s/p robotic Yfn Mehran Esophagogastrectomy and jejunostomy tube placement.   2/27 J tube placement confirmed on gastrograffin study  2/28 Right pleural chest tube dressing saturated, removed at bedside, likely leaking around tube site without obvious drainage when evaluated.   3/1 CXR ? change in right pleural space, CT chest done without significant findings as per Dr. Blanco, NGT secured to suction, CXR improved, patient feeling better   3/3 - worsening abdominal pain, CT chest suspicious for leak, some free IP air as expected after abdominal surgery, less than on CT performed 3/1, CT Abd- no acute abdominal findings on preliminary report.    3/4 serratia and psuedomonas PNA  3/6 MBS done, cleared for dysphagia 3 with nectar thick liquids  3/8 aspiration event resulting in PEA arrest, ACLS started, CPR x 4 minutes before ROSC, bedside bronch (small hole in trachea per Dr Blanco), hypercapnia, refractory hypoxemia/ARDS requiring NO and critical care medicine consult  3/9 reintubated with 9.0 ETT (from 8.0), vent settings adjusted with significant improvement in hypercapnic respiratory failure/acidemia

## 2018-03-11 NOTE — PROGRESS NOTE ADULT - ASSESSMENT
58 y/o M with esophageal Ca s/p robotic Addison Mehran Esophagogastrectomy and jejunostomy tube placement 2/6/18, developed fever post op 3/4/18, sputum culture with Serratia marcescens and Pseudomonas aeruginosa

## 2018-03-11 NOTE — PROGRESS NOTE ADULT - SUBJECTIVE AND OBJECTIVE BOX
[facility] DIVISION OF KIDNEY DISEASES AND HYPERTENSION -- FOLLOW UP NOTE  --------------------------------------------------------------------------------  Chief Complaint: ATN    24 hour events/subjective:  Pt seen and examined  Int/sedated  Good diuresis on lasix drip; now off    PAST HISTORY  --------------------------------------------------------------------------------  No significant changes to PMH, PSH, FHx, SHx, unless otherwise noted    ALLERGIES & MEDICATIONS  --------------------------------------------------------------------------------  Allergies    No Known Allergies    Intolerances      Standing Inpatient Medications  cefepime  IVPB 2000 milliGRAM(s) IV Intermittent every 12 hours  chlorhexidine 0.12% Liquid 15 milliLiter(s) Swish and Spit two times a day  diltiazem Injectable 10 milliGRAM(s) IV Push once  enoxaparin Injectable 40 milliGRAM(s) SubCutaneous daily  pantoprazole  Injectable 40 milliGRAM(s) IV Push daily  propofol Infusion 10 MICROgram(s)/kG/Min IV Continuous <Continuous>    PRN Inpatient Medications  artificial  tears Solution 1 Drop(s) Both EYES three times a day PRN  bisacodyl Suppository 10 milliGRAM(s) Rectal daily PRN  fentaNYL    Injectable 50 MICROGram(s) IV Push every 3 hours PRN      REVIEW OF SYSTEMS  --------------------------------------------------------------------------------  Unable to obtain    VITALS/PHYSICAL EXAM  --------------------------------------------------------------------------------  T(C): 36.7 (03-11-18 @ 08:00), Max: 37.7 (03-10-18 @ 17:00)  HR: 149 (03-11-18 @ 10:00) (82 - 149)  BP: --  RR: 30 (03-11-18 @ 10:00) (21 - 35)  SpO2: 93% (03-11-18 @ 10:00) (83% - 100%)  Wt(kg): --        03-10-18 @ 06:01  -  03-11-18 @ 07:00  --------------------------------------------------------  IN: 660.6 mL / OUT: 3310 mL / NET: -2649.4 mL    03-11-18 @ 07:01  -  03-11-18 @ 11:48  --------------------------------------------------------  IN: 198.6 mL / OUT: 325 mL / NET: -126.4 mL      Physical Exam:  	Gen: int/sed  	HEENT: ETT+  	Pulm: vent BS  	CV: RRR, S1S2; no rub  	Back: No spinal or CVA tenderness; no sacral edema  	Abd: +BS, soft, nontender/nondistended  	: castro+  	LE: Warm, FROM, no clubbing, intact strength; no edema  	Skin: Warm, without rashes    LABS/STUDIES  --------------------------------------------------------------------------------              8.4    19.0  >-----------<  432      [03-11-18 @ 04:34]              24.5     138  |  90  |  48.0  ----------------------------<  122      [03-11-18 @ 04:34]  4.4   |  37.0  |  1.76        Ca     8.4     [03-11-18 @ 04:34]      Mg     2.6     [03-11-18 @ 04:34]      Phos  3.7     [03-10-18 @ 12:47]    TPro  6.6  /  Alb  2.5  /  TBili  0.3  /  DBili  x   /  AST  30  /  ALT  30  /  AlkPhos  97  [03-10-18 @ 12:47]          Creatinine Trend:  SCr 1.76 [03-11 @ 04:34]  SCr 1.61 [03-10 @ 12:47]  SCr 1.53 [03-10 @ 03:41]  SCr 1.38 [03-09 @ 05:26]  SCr 1.35 [03-09 @ 00:50]        HbA1c 5.5      [02-12-18 @ 10:54]  Lipid: chol --, , HDL --, LDL --      [03-10-18 @ 12:47]

## 2018-03-11 NOTE — PROGRESS NOTE ADULT - PROBLEM SELECTOR PLAN 3
lasix infusion d/c'd, continue with diamox for elevated bicarb levels  adjust meds per CrCl  Hold nephrotoxic meds  Trend urine output  Trend BMP

## 2018-03-12 DIAGNOSIS — I82.90 ACUTE EMBOLISM AND THROMBOSIS OF UNSPECIFIED VEIN: ICD-10-CM

## 2018-03-12 DIAGNOSIS — I48.91 UNSPECIFIED ATRIAL FIBRILLATION: ICD-10-CM

## 2018-03-12 LAB
ANION GAP SERPL CALC-SCNC: 11 MMOL/L — SIGNIFICANT CHANGE UP (ref 5–17)
APTT BLD: 27 SEC — LOW (ref 27.5–37.4)
APTT BLD: 32.2 SEC — SIGNIFICANT CHANGE UP (ref 27.5–37.4)
BUN SERPL-MCNC: 49 MG/DL — HIGH (ref 8–20)
CALCIUM SERPL-MCNC: 8.6 MG/DL — SIGNIFICANT CHANGE UP (ref 8.6–10.2)
CHLORIDE SERPL-SCNC: 96 MMOL/L — LOW (ref 98–107)
CO2 SERPL-SCNC: 35 MMOL/L — HIGH (ref 22–29)
CREAT SERPL-MCNC: 1.49 MG/DL — HIGH (ref 0.5–1.3)
GAS PNL BLDA: SIGNIFICANT CHANGE UP
GLUCOSE SERPL-MCNC: 106 MG/DL — SIGNIFICANT CHANGE UP (ref 70–115)
HCT VFR BLD CALC: 25.9 % — LOW (ref 42–52)
HCT VFR BLD CALC: 27.8 % — LOW (ref 42–52)
HGB BLD-MCNC: 8.5 G/DL — LOW (ref 14–18)
HGB BLD-MCNC: 9 G/DL — LOW (ref 14–18)
MAGNESIUM SERPL-MCNC: 2.7 MG/DL — HIGH (ref 1.6–2.6)
MCHC RBC-ENTMCNC: 28.3 PG — SIGNIFICANT CHANGE UP (ref 27–31)
MCHC RBC-ENTMCNC: 28.8 PG — SIGNIFICANT CHANGE UP (ref 27–31)
MCHC RBC-ENTMCNC: 32.4 G/DL — SIGNIFICANT CHANGE UP (ref 32–36)
MCHC RBC-ENTMCNC: 32.8 G/DL — SIGNIFICANT CHANGE UP (ref 32–36)
MCV RBC AUTO: 86.3 FL — SIGNIFICANT CHANGE UP (ref 80–94)
MCV RBC AUTO: 88.8 FL — SIGNIFICANT CHANGE UP (ref 80–94)
PLATELET # BLD AUTO: 396 K/UL — SIGNIFICANT CHANGE UP (ref 150–400)
PLATELET # BLD AUTO: 423 K/UL — HIGH (ref 150–400)
POTASSIUM SERPL-MCNC: 4 MMOL/L — SIGNIFICANT CHANGE UP (ref 3.5–5.3)
POTASSIUM SERPL-SCNC: 4 MMOL/L — SIGNIFICANT CHANGE UP (ref 3.5–5.3)
RBC # BLD: 3 M/UL — LOW (ref 4.6–6.2)
RBC # BLD: 3.13 M/UL — LOW (ref 4.6–6.2)
RBC # FLD: 14.7 % — SIGNIFICANT CHANGE UP (ref 11–15.6)
RBC # FLD: 15.4 % — SIGNIFICANT CHANGE UP (ref 11–15.6)
SODIUM SERPL-SCNC: 142 MMOL/L — SIGNIFICANT CHANGE UP (ref 135–145)
WBC # BLD: 11.4 K/UL — HIGH (ref 4.8–10.8)
WBC # BLD: 7.4 K/UL — SIGNIFICANT CHANGE UP (ref 4.8–10.8)
WBC # FLD AUTO: 11.4 K/UL — HIGH (ref 4.8–10.8)
WBC # FLD AUTO: 7.4 K/UL — SIGNIFICANT CHANGE UP (ref 4.8–10.8)

## 2018-03-12 PROCEDURE — 71045 X-RAY EXAM CHEST 1 VIEW: CPT | Mod: 26,77

## 2018-03-12 PROCEDURE — 93010 ELECTROCARDIOGRAM REPORT: CPT

## 2018-03-12 PROCEDURE — 71045 X-RAY EXAM CHEST 1 VIEW: CPT | Mod: 26

## 2018-03-12 PROCEDURE — 99233 SBSQ HOSP IP/OBS HIGH 50: CPT

## 2018-03-12 PROCEDURE — 99291 CRITICAL CARE FIRST HOUR: CPT | Mod: 24

## 2018-03-12 RX ORDER — FENTANYL CITRATE 50 UG/ML
100 INJECTION INTRAVENOUS ONCE
Qty: 0 | Refills: 0 | Status: DISCONTINUED | OUTPATIENT
Start: 2018-03-12 | End: 2018-03-12

## 2018-03-12 RX ORDER — AMIODARONE HYDROCHLORIDE 400 MG/1
1 TABLET ORAL
Qty: 900 | Refills: 0 | Status: DISCONTINUED | OUTPATIENT
Start: 2018-03-12 | End: 2018-03-13

## 2018-03-12 RX ORDER — ENOXAPARIN SODIUM 100 MG/ML
40 INJECTION SUBCUTANEOUS DAILY
Qty: 0 | Refills: 0 | Status: DISCONTINUED | OUTPATIENT
Start: 2018-03-13 | End: 2018-03-13

## 2018-03-12 RX ORDER — VECURONIUM BROMIDE 20 MG/1
10 INJECTION, POWDER, FOR SOLUTION INTRAVENOUS ONCE
Qty: 0 | Refills: 0 | Status: COMPLETED | OUTPATIENT
Start: 2018-03-12 | End: 2018-03-12

## 2018-03-12 RX ORDER — HEPARIN SODIUM 5000 [USP'U]/ML
INJECTION INTRAVENOUS; SUBCUTANEOUS
Qty: 25000 | Refills: 0 | Status: DISCONTINUED | OUTPATIENT
Start: 2018-03-12 | End: 2018-03-12

## 2018-03-12 RX ORDER — FENTANYL CITRATE 50 UG/ML
50 INJECTION INTRAVENOUS ONCE
Qty: 0 | Refills: 0 | Status: DISCONTINUED | OUTPATIENT
Start: 2018-03-12 | End: 2018-03-12

## 2018-03-12 RX ORDER — DIGOXIN 250 MCG
0.25 TABLET ORAL ONCE
Qty: 0 | Refills: 0 | Status: COMPLETED | OUTPATIENT
Start: 2018-03-12 | End: 2018-03-12

## 2018-03-12 RX ORDER — MIDAZOLAM HYDROCHLORIDE 1 MG/ML
2 INJECTION, SOLUTION INTRAMUSCULAR; INTRAVENOUS ONCE
Qty: 0 | Refills: 0 | Status: DISCONTINUED | OUTPATIENT
Start: 2018-03-12 | End: 2018-03-12

## 2018-03-12 RX ORDER — POTASSIUM CHLORIDE 20 MEQ
10 PACKET (EA) ORAL ONCE
Qty: 0 | Refills: 0 | Status: COMPLETED | OUTPATIENT
Start: 2018-03-12 | End: 2018-03-12

## 2018-03-12 RX ORDER — DIGOXIN 250 MCG
0.5 TABLET ORAL ONCE
Qty: 0 | Refills: 0 | Status: COMPLETED | OUTPATIENT
Start: 2018-03-12 | End: 2018-03-12

## 2018-03-12 RX ORDER — DIGOXIN 250 MCG
0.25 TABLET ORAL DAILY
Qty: 0 | Refills: 0 | Status: DISCONTINUED | OUTPATIENT
Start: 2018-03-13 | End: 2018-03-13

## 2018-03-12 RX ORDER — PHENYLEPHRINE HYDROCHLORIDE 10 MG/ML
0.1 INJECTION INTRAVENOUS
Qty: 80 | Refills: 0 | Status: DISCONTINUED | OUTPATIENT
Start: 2018-03-12 | End: 2018-03-14

## 2018-03-12 RX ORDER — SODIUM CHLORIDE 9 MG/ML
250 INJECTION INTRAMUSCULAR; INTRAVENOUS; SUBCUTANEOUS ONCE
Qty: 0 | Refills: 0 | Status: COMPLETED | OUTPATIENT
Start: 2018-03-12 | End: 2018-03-12

## 2018-03-12 RX ORDER — AMIODARONE HYDROCHLORIDE 400 MG/1
0.5 TABLET ORAL
Qty: 900 | Refills: 0 | Status: DISCONTINUED | OUTPATIENT
Start: 2018-03-13 | End: 2018-03-13

## 2018-03-12 RX ADMIN — FENTANYL CITRATE 50 MICROGRAM(S): 50 INJECTION INTRAVENOUS at 06:33

## 2018-03-12 RX ADMIN — SODIUM CHLORIDE 1500 MILLILITER(S): 9 INJECTION INTRAMUSCULAR; INTRAVENOUS; SUBCUTANEOUS at 17:30

## 2018-03-12 RX ADMIN — Medication 1 DROP(S): at 05:30

## 2018-03-12 RX ADMIN — FENTANYL CITRATE 50 MICROGRAM(S): 50 INJECTION INTRAVENOUS at 03:05

## 2018-03-12 RX ADMIN — FENTANYL CITRATE 50 MICROGRAM(S): 50 INJECTION INTRAVENOUS at 14:15

## 2018-03-12 RX ADMIN — Medication 0.25 MILLIGRAM(S): at 14:18

## 2018-03-12 RX ADMIN — MIDAZOLAM HYDROCHLORIDE 2 MILLIGRAM(S): 1 INJECTION, SOLUTION INTRAMUSCULAR; INTRAVENOUS at 21:02

## 2018-03-12 RX ADMIN — PROPOFOL 3.24 MICROGRAM(S)/KG/MIN: 10 INJECTION, EMULSION INTRAVENOUS at 11:39

## 2018-03-12 RX ADMIN — FENTANYL CITRATE 50 MICROGRAM(S): 50 INJECTION INTRAVENOUS at 15:42

## 2018-03-12 RX ADMIN — Medication 7.5 MG/HR: at 11:38

## 2018-03-12 RX ADMIN — FENTANYL CITRATE 50 MICROGRAM(S): 50 INJECTION INTRAVENOUS at 02:57

## 2018-03-12 RX ADMIN — FENTANYL CITRATE 50 MICROGRAM(S): 50 INJECTION INTRAVENOUS at 08:49

## 2018-03-12 RX ADMIN — FENTANYL CITRATE 50 MICROGRAM(S): 50 INJECTION INTRAVENOUS at 16:00

## 2018-03-12 RX ADMIN — PROPOFOL 3.24 MICROGRAM(S)/KG/MIN: 10 INJECTION, EMULSION INTRAVENOUS at 15:52

## 2018-03-12 RX ADMIN — Medication 1 DROP(S): at 11:39

## 2018-03-12 RX ADMIN — CHLORHEXIDINE GLUCONATE 15 MILLILITER(S): 213 SOLUTION TOPICAL at 12:58

## 2018-03-12 RX ADMIN — HEPARIN SODIUM 1200 UNIT(S)/HR: 5000 INJECTION INTRAVENOUS; SUBCUTANEOUS at 17:46

## 2018-03-12 RX ADMIN — CEFEPIME 100 MILLIGRAM(S): 1 INJECTION, POWDER, FOR SOLUTION INTRAMUSCULAR; INTRAVENOUS at 17:40

## 2018-03-12 RX ADMIN — HEPARIN SODIUM 1000 UNIT(S)/HR: 5000 INJECTION INTRAVENOUS; SUBCUTANEOUS at 11:17

## 2018-03-12 RX ADMIN — FENTANYL CITRATE 50 MICROGRAM(S): 50 INJECTION INTRAVENOUS at 09:00

## 2018-03-12 RX ADMIN — LEVALBUTEROL 1.25 MILLIGRAM(S): 1.25 SOLUTION, CONCENTRATE RESPIRATORY (INHALATION) at 08:33

## 2018-03-12 RX ADMIN — Medication 50 MILLIEQUIVALENT(S): at 16:00

## 2018-03-12 RX ADMIN — FENTANYL CITRATE 50 MICROGRAM(S): 50 INJECTION INTRAVENOUS at 13:15

## 2018-03-12 RX ADMIN — FENTANYL CITRATE 50 MICROGRAM(S): 50 INJECTION INTRAVENOUS at 06:22

## 2018-03-12 RX ADMIN — Medication 0.5 MILLIGRAM(S): at 10:02

## 2018-03-12 RX ADMIN — FENTANYL CITRATE 50 MICROGRAM(S): 50 INJECTION INTRAVENOUS at 09:37

## 2018-03-12 RX ADMIN — CEFEPIME 100 MILLIGRAM(S): 1 INJECTION, POWDER, FOR SOLUTION INTRAMUSCULAR; INTRAVENOUS at 05:30

## 2018-03-12 RX ADMIN — FENTANYL CITRATE 50 MICROGRAM(S): 50 INJECTION INTRAVENOUS at 14:03

## 2018-03-12 RX ADMIN — LEVALBUTEROL 1.25 MILLIGRAM(S): 1.25 SOLUTION, CONCENTRATE RESPIRATORY (INHALATION) at 02:37

## 2018-03-12 RX ADMIN — FENTANYL CITRATE 100 MICROGRAM(S): 50 INJECTION INTRAVENOUS at 21:02

## 2018-03-12 RX ADMIN — VECURONIUM BROMIDE 10 MILLIGRAM(S): 20 INJECTION, POWDER, FOR SOLUTION INTRAVENOUS at 17:30

## 2018-03-12 RX ADMIN — FENTANYL CITRATE 50 MICROGRAM(S): 50 INJECTION INTRAVENOUS at 09:45

## 2018-03-12 RX ADMIN — LEVALBUTEROL 1.25 MILLIGRAM(S): 1.25 SOLUTION, CONCENTRATE RESPIRATORY (INHALATION) at 14:25

## 2018-03-12 RX ADMIN — FENTANYL CITRATE 50 MICROGRAM(S): 50 INJECTION INTRAVENOUS at 13:30

## 2018-03-12 RX ADMIN — LEVALBUTEROL 1.25 MILLIGRAM(S): 1.25 SOLUTION, CONCENTRATE RESPIRATORY (INHALATION) at 20:28

## 2018-03-12 RX ADMIN — CHLORHEXIDINE GLUCONATE 15 MILLILITER(S): 213 SOLUTION TOPICAL at 05:30

## 2018-03-12 RX ADMIN — PANTOPRAZOLE SODIUM 40 MILLIGRAM(S): 20 TABLET, DELAYED RELEASE ORAL at 11:18

## 2018-03-12 NOTE — PROGRESS NOTE ADULT - PROBLEM SELECTOR PLAN 3
lasix infusion d/c'd, diamox PRN to assist with diuresis and metabolic alkalosis  adjust meds per CrCl  Hold nephrotoxic meds  Trend urine output  Trend BMP

## 2018-03-12 NOTE — CHART NOTE - NSCHARTNOTEFT_GEN_A_CORE
Source: Patient [ ]  Family [ ]   other [ x]    Current Diet:   NPO   Patient reports [ ] nausea  [ ] vomiting [ ] diarrhea [ ] constipation  [ ]chewing problems [ ] swallowing issues  [ ] other:     PO intake:  < 50% [ ]   50-75%  [ ]   %  [ ]  other :    Source for PO intake [ ] Patient [ ] family [ ] chart [ ] staff [ ] other    Enteral /Parenteral Nutrition: Diet, NPO with Tube Feed:   Vital 1.5 Myron    Tube Feeding Modality: Jejunostomy  Total Volume for 24 Hours (mL): 480  Continuous  Starting Tube Feed Rate {mL per Hour}: 20  Until Goal Tube Feed Rate (mL per Hour): 20  Tube Feed Duration (in Hours): 24  Tube Feed Start Time: 09:30 (03-12-18 @ 09:30)      Current Weight:  3/11; 55 kg   2/12: 54kg     % Weight Change     Pertinent Medications: MEDICATIONS  (STANDING):  cefepime  IVPB 2000 milliGRAM(s) IV Intermittent every 12 hours  chlorhexidine 0.12% Liquid 15 milliLiter(s) Swish and Spit two times a day  digoxin  Injectable 0.25 milliGRAM(s) IV Push once  diltiazem Infusion 7.5 mG/Hr (7.5 mL/Hr) IV Continuous <Continuous>  heparin  Infusion.  Unit(s)/Hr (10 mL/Hr) IV Continuous <Continuous>  levalbuterol Inhalation 1.25 milliGRAM(s) Inhalation every 6 hours  pantoprazole  Injectable 40 milliGRAM(s) IV Push daily  propofol Infusion 10 MICROgram(s)/kG/Min (3.24 mL/Hr) IV Continuous <Continuous>    MEDICATIONS  (PRN):  artificial  tears Solution 1 Drop(s) Both EYES three times a day PRN Dry Eyes  bisacodyl Suppository 10 milliGRAM(s) Rectal daily PRN Constipation  fentaNYL    Injectable 50 MICROGram(s) IV Push every 3 hours PRN sedation off paralytic    Pertinent Labs: CBC Full  -  ( 12 Mar 2018 03:23 )  WBC Count : 11.4 K/uL  Hemoglobin : 8.5 g/dL  Hematocrit : 25.9 %  Platelet Count - Automated : 396 K/uL  Mean Cell Volume : 86.3 fl  Mean Cell Hemoglobin : 28.3 pg  Mean Cell Hemoglobin Concentration : 32.8 g/dL  Auto Neutrophil # : x  Auto Lymphocyte # : x  Auto Monocyte # : x  Auto Eosinophil # : x  Auto Basophil # : x  Auto Neutrophil % : x  Auto Lymphocyte % : x  Auto Monocyte % : x  Auto Eosinophil % : x  Auto Basophil % : x        Skin: surgical sites     Nutrition focused physical exam conducted - found signs of malnutrition [ ]absent [x ]present    Subcutaneous fat loss: [ x] Orbital fat pads region, [ ]Buccal fat region, [ ]Triceps region,  [ ]Ribs region    Muscle wasting: [x ]Temples region, [ x]Clavicle region, x[ ]Shoulder region, [ ]Scapula region, [ ]Interosseous region,  [ ]thigh region, [ ]Calf region    Estimated Needs:   [x ] no change since previous assessment  [ ] recalculated:     Current Nutrition Diagnosis:  Pt remains at high nutrition risk secondary to severe protein calorie malnutrition related to inadequate energy intake with increased needs in setting of esophogeal cancer as evidenced by wt loss of 40# (25%) over past 5 months, physical signs of muscle mass loss in temple, clavicle, shoulder and subcutaneous fat loss in thoracic region. Pt now reintubated, feeds were held, Enteral feeds of Vital @ 20ml restarted this a.m. noted. See Recommendations below:     Recommendations:   1. Rx: MVI daily via J-tube  2. Increase enteral feeds of Vital by 10ml every 8 hours as tolerated to goal rate = 55ml/hr (1100ml/day) x 20 hours 1650 kcal, 74gm protein      Monitoring and Evaluation:   [ ] PO intake [x ] Tolerance to diet prescription [X] Weights  [X] Follow up per protocol [X] Labs:

## 2018-03-12 NOTE — PROGRESS NOTE ADULT - SUBJECTIVE AND OBJECTIVE BOX
[facility] DIVISION OF KIDNEY DISEASES AND HYPERTENSION -- FOLLOW UP NOTE  --------------------------------------------------------------------------------  Chief Complaint: ATN    24 hour events/subjective:  Pt seen and examined  Int/sedated  Still with respiratory acidosis      PAST HISTORY  --------------------------------------------------------------------------------  No significant changes to PMH, PSH, FHx, SHx, unless otherwise noted    ALLERGIES & MEDICATIONS  --------------------------------------------------------------------------------  Allergies    No Known Allergies    Intolerances      Standing Inpatient Medications  cefepime  IVPB 2000 milliGRAM(s) IV Intermittent every 12 hours  chlorhexidine 0.12% Liquid 15 milliLiter(s) Swish and Spit two times a day  diltiazem Infusion 7.5 mG/Hr IV Continuous <Continuous>  heparin  Infusion.  Unit(s)/Hr IV Continuous <Continuous>  levalbuterol Inhalation 1.25 milliGRAM(s) Inhalation every 6 hours  pantoprazole  Injectable 40 milliGRAM(s) IV Push daily  propofol Infusion 10 MICROgram(s)/kG/Min IV Continuous <Continuous>    PRN Inpatient Medications  artificial  tears Solution 1 Drop(s) Both EYES three times a day PRN  bisacodyl Suppository 10 milliGRAM(s) Rectal daily PRN  fentaNYL    Injectable 50 MICROGram(s) IV Push every 3 hours PRN      REVIEW OF SYSTEMS  --------------------------------------------------------------------------------  Unable to obtain    VITALS/PHYSICAL EXAM  --------------------------------------------------------------------------------  T(C): 38.2 (03-12-18 @ 15:00), Max: 38.2 (03-12-18 @ 14:00)  HR: 139 (03-12-18 @ 15:00) (136 - 145)  BP: 111/63 (03-12-18 @ 08:20) (111/63 - 111/63)  RR: 28 (03-12-18 @ 15:00) (16 - 41)  SpO2: 92% (03-12-18 @ 15:00) (88% - 99%)  Wt(kg): --        03-11-18 @ 07:01  -  03-12-18 @ 07:00  --------------------------------------------------------  IN: 1290.9 mL / OUT: 2495 mL / NET: -1204.1 mL    03-12-18 @ 07:01  -  03-12-18 @ 15:13  --------------------------------------------------------  IN: 385.2 mL / OUT: 810 mL / NET: -424.8 mL      Physical Exam:  	Gen: int/sed  	HEENT: ETT+  	Pulm: vent BS  	CV: RRR, S1S2; no rub  	Back: No spinal or CVA tenderness; no sacral edema  	Abd: +BS, soft, nontender/nondistended  	: gloria+  	LE: Warm, FROM, no clubbing, intact strength; no edema  	Skin: Warm, without rashes  LABS/STUDIES  --------------------------------------------------------------------------------              8.5    11.4  >-----------<  396      [03-12-18 @ 03:23]              25.9     142  |  96  |  49.0  ----------------------------<  106      [03-12-18 @ 03:23]  4.0   |  35.0  |  1.49        Ca     8.6     [03-12-18 @ 03:23]      Mg     2.7     [03-12-18 @ 03:23]        PTT: 27.0       [03-12-18 @ 10:18]      Creatinine Trend:  SCr 1.49 [03-12 @ 03:23]  SCr 1.76 [03-11 @ 04:34]  SCr 1.61 [03-10 @ 12:47]  SCr 1.53 [03-10 @ 03:41]  SCr 1.38 [03-09 @ 05:26]        HbA1c 5.5      [02-12-18 @ 10:54]  Lipid: chol --, , HDL --, LDL --      [03-10-18 @ 12:47]

## 2018-03-12 NOTE — PROGRESS NOTE ADULT - PROBLEM SELECTOR PLAN 1
hypoxemia markedly improved, Melchor remains off  titrate FiO2 to maintain SpO2 > 90%  Daily CXR  ABGs PRN  vent transitioned to PS/CPAP d/t breath stacking (tolerating well, with adequate ventilation and oxygenation)

## 2018-03-12 NOTE — PROGRESS NOTE ADULT - ASSESSMENT
1) ATN  2) Oligo-anuria  3) Respiratory Acidosis  4) Hypotension    Patient is s/p PEA arrest with 4 minute CPR  Has ATN from poor perfusion;   Profound respiratory acidosis; chronically compensating  Follow I/O  Net negative  Follow ABG  d/w CTICU

## 2018-03-12 NOTE — PROGRESS NOTE ADULT - SUBJECTIVE AND OBJECTIVE BOX
patient seen and examined with Dr thomas     Patient is a 57y old  Male who presents with a chief complaint of Esophageal Cancer    HPI:  Pt is a 57 y.o male with esophageal cancer diagnosed 5 months ago with recent unintentional weight loss of about 40 lbs now scheduled for esophagogastroduodenoscopy (EGD) Robotic [place] Mehran esophagogastrectomy.     still intubated and sedated.  Melchor weaned off, NGT d/c'd 3/11 and unable to replace. Tube feeds on hold when we saw patient       ICU Vital Signs Last 24 Hrs  T(C): 36.9 (12 Mar 2018 19:00), Max: 38.2 (12 Mar 2018 14:00)  T(F): 98.4 (12 Mar 2018 19:00), Max: 100.8 (12 Mar 2018 14:00)  HR: 132 (12 Mar 2018 19:00) (130 - 145)  BP: 111/63 (12 Mar 2018 08:20) (111/63 - 111/63)  BP(mean): 81 (12 Mar 2018 08:20) (81 - 81)  ABP: 96/50 (12 Mar 2018 19:00) (74/41 - 152/71)  ABP(mean): 62 (12 Mar 2018 19:00) (50 - 91)  RR: 34 (12 Mar 2018 19:00) (16 - 41)  SpO2: 90% (12 Mar 2018 19:00) (88% - 98%)      I&O's Detail    11 Mar 2018 07:01  -  12 Mar 2018 07:00  --------------------------------------------------------  IN:    diltiazem Infusion: 152.5 mL    diltiazem Infusion: 25 mL    Enteral Tube Flush: 60 mL    Jevity: 80 mL    Lactated Ringers IV Bolus: 250 mL    Other: 20 mL    propofol Infusion: 353.4 mL    Solution: 150 mL    Solution: 100 mL    Solution: 100 mL  Total IN: 1290.9 mL    OUT:    Bulb: 40 mL    Indwelling Catheter - Urethral: 2415 mL    Other: 40 mL  Total OUT: 2495 mL    Total NET: -1204.1 mL      12 Mar 2018 07:01  -  12 Mar 2018 19:18  --------------------------------------------------------  IN:    diltiazem Infusion: 82.5 mL    Enteral Tube Flush: 40 mL    heparin  Infusion.: 67 mL    propofol Infusion: 124.4 mL    Sodium Chloride 0.9% IV Bolus: 250 mL    Solution: 50 mL    Solution: 50 mL    Vital HN: 160 mL  Total IN: 823.9 mL    OUT:    Bulb: 35 mL    Indwelling Catheter - Urethral: 925 mL  Total OUT: 960 mL    Total NET: -136.1 mL          Mode: AC/ CMV (Assist Control/ Continuous Mandatory Ventilation)  RR (machine): 30  TV (machine): 550  FiO2: 80  PEEP: 8  MAP: 10  PIP: 18    ABG - ( 12 Mar 2018 18:43 )  pH: 7.17  /  pCO2: 102   /  pO2: 74    / HCO3: 30    / Base Excess: 6.3   /  SaO2: 88                  MEDICATIONS  (STANDING):  cefepime  IVPB 2000 milliGRAM(s) IV Intermittent every 12 hours  chlorhexidine 0.12% Liquid 15 milliLiter(s) Swish and Spit two times a day  diltiazem Infusion 7.5 mG/Hr (7.5 mL/Hr) IV Continuous <Continuous>  heparin  Infusion.  Unit(s)/Hr (10 mL/Hr) IV Continuous <Continuous>  levalbuterol Inhalation 1.25 milliGRAM(s) Inhalation every 6 hours  pantoprazole  Injectable 40 milliGRAM(s) IV Push daily  propofol Infusion 10 MICROgram(s)/kG/Min (3.24 mL/Hr) IV Continuous <Continuous>    MEDICATIONS  (PRN):  artificial  tears Solution 1 Drop(s) Both EYES three times a day PRN Dry Eyes  bisacodyl Suppository 10 milliGRAM(s) Rectal daily PRN Constipation  fentaNYL    Injectable 50 MICROGram(s) IV Push every 3 hours PRN sedation off paralytic      Physical Exam:   Constitutional: thin, ill appearing  HEENT: + ETT, + NGT, copious secretions,   Respiratory: Breath Sounds equal & clear bilaterally to auscultation dec BS bases   Cardiovascular: tachycardic, irregularly irregular, normal S1, S2; no murmurs or rub  Chest: + LSC TLC site C/D/I, right CT to bulb suction, thoracotomy C/D/I without heat or redness  Abdomen/GI: Soft, non-tender, non distended, hyperactive bowel sounds, + J-tube. port holes x 5 C/D/I with steris, no heat or redness  : castro in situ to gravity drainage  Extremities: no peripheral edema except distal RUE/right hand, no cyanosis, no clubbing.   Vascular: Equal and normal pulses: 2+ peripheral pulses throughout  Neurological: sedated, arouses with sedation vacation (weakly moves right hand and both feet but not left hand)  Psychiatric: sedated  Skin: warm, dry, well perfused, no rashes            LABS:  CBC Full  -  ( 12 Mar 2018 17:17 )  WBC Count : 7.4 K/uL  Hemoglobin : 9.0 g/dL  Hematocrit : 27.8 %  Platelet Count - Automated : 423 K/uL  Mean Cell Volume : 88.8 fl  Mean Cell Hemoglobin : 28.8 pg  Mean Cell Hemoglobin Concentration : 32.4 g/dL  Auto Neutrophil # : x  Auto Lymphocyte # : x  Auto Monocyte # : x  Auto Eosinophil # : x  Auto Basophil # : x  Auto Neutrophil % : x  Auto Lymphocyte % : x  Auto Monocyte % : x  Auto Eosinophil % : x  Auto Basophil % : x    03-12    142  |  96<L>  |  49.0<H>  ----------------------------<  106  4.0   |  35.0<H>  |  1.49<H>    Ca    8.6      12 Mar 2018 03:23  Mg     2.7     03-12      PTT - ( 12 Mar 2018 17:17 )  PTT:32.2 sec    RECENT CULTURES:  03-09 .Blood Blood XXXX XXXX   No growth at 48 hours    03-08 .Blood Blood XXXX XXXX   No growth at 48 hours    03-08 .Blood Blood XXXX XXXX   No growth at 48 hours              CAPILLARY BLOOD GLUCOSE      RADIOLOGY & ADDITIONAL STUDIES:

## 2018-03-12 NOTE — PROGRESS NOTE ADULT - SUBJECTIVE AND OBJECTIVE BOX
Brief Hospital Course:   2/26 s/p robotic Yfn Mehran Esophagogastrectomy and jejunostomy tube placement.   2/27 J tube placement confirmed on gastrograffin study  2/28 Right pleural chest tube dressing saturated, removed at bedside, likely leaking around tube site without obvious drainage when evaluated.   3/1 CXR ? change in right pleural space, CT chest done without significant findings as per Dr. Blanco, NGT secured to suction, CXR improved, patient feeling better   3/3 - worsening abdominal pain, CT chest suspicious for leak, some free IP air as expected after abdominal surgery, less than on CT performed 3/1, CT Abd- no acute abdominal findings on preliminary report.    3/4 Gram negative rods in sputum febrile to 101.9 on vanc zosyn   3/5 ID consulted, presumed PNA, zosyn continues, vanco d/c'd  3/6 MBS done, cleared for dysphagia 3 with nectar thick liquids  3/8 aspiration event resulting in PEA arrest, ACLS started, CPR x 4 minutes before ROSC, bedside bronch (small hole in trachea per Dr Blanco), hypercapnia, refractory hypoxemia requiring NO  3/9 reintubated with 9.0 ETT (from 8.0), vent settings adjusted with significant improvement in hypercapnic respiratory failure/acidemia  3/11 nimbex and versed d/c'd, lasix infusion decreased then d/c'd, diamox given, Melchor weaned off, NGT d/c'd       Subjective/ROS: unable to obtain (intubated & sedated)      Patient is a 57y old  Male who presents with a chief complaint of Esophageal Cancer (12 Feb 2018 10:28)    HPI:  Pt is a 57 y.o male with esophageal cancer diagnosed 5 months ago with recent unintentional weight loss of about 40 lbs now scheduled for esophagogastroduodenoscopy (EGD) Robotic [place] Mehran esophagogastrectomy. (12 Feb 2018 09:27)    PAST MEDICAL & SURGICAL HISTORY:  Esophageal cancer  Weight loss, unintentional  Hodgkins lymphoma: 25 years ago  Acid reflux disease  No significant past surgical history    FAMILY HISTORY:  Family history of prostate cancer in father (Father)      Vitals   ICU Vital Signs Last 24 Hrs  T(C): 37.7 (12 Mar 2018 01:00), Max: 37.7 (12 Mar 2018 00:45)  T(F): 99.9 (12 Mar 2018 01:00), Max: 99.9 (12 Mar 2018 00:45)  HR: 142 (12 Mar 2018 01:30) (82 - 149), a-fib  ABP: 107/56 (12 Mar 2018 01:30) (96/49 - 163/75)  ABP(mean): 70 (12 Mar 2018 01:30) (63 - 105)  RR: 20 (12 Mar 2018 01:30) (16 - 36)  SpO2: 94% (12 Mar 2018 01:30) (91% - 100%)      VENT SETTINGS   Mode: CPAP with PS  FiO2: 60  PEEP: 8  PS: 12  MAP: 12    ABG - ( 12 Mar 2018 00:03 )  pH: 7.42  /  pCO2: 57    /  pO2: 74    / HCO3: 35    / Base Excess: 11.3  /  SaO2: 94          I&O's Detail    10 Mar 2018 06:01  -  11 Mar 2018 07:00  --------------------------------------------------------  IN:    cisatracurium Infusion: 9.7 mL    Enteral Tube Flush: 80 mL    furosemide Infusion: 35 mL    furosemide Infusion: 20 mL    midazolam Infusion: 18 mL    Other: 50 mL    propofol Infusion: 47.9 mL    Solution: 100 mL    Solution: 300 mL  Total IN: 660.6 mL    OUT:    Bulb: 30 mL    Indwelling Catheter - Urethral: 3255 mL    Other: 20 mL    Stool: 5 mL  Total OUT: 3310 mL    Total NET: -2649.4 mL      11 Mar 2018 07:01  -  12 Mar 2018 02:00  --------------------------------------------------------  IN:    diltiazem Infusion: 25 mL    diltiazem Infusion: 100 mL    Enteral Tube Flush: 20 mL    Jevity: 80 mL    Lactated Ringers IV Bolus: 250 mL    Other: 20 mL    propofol Infusion: 269 mL    Solution: 100 mL    Solution: 150 mL  Total IN: 1014 mL    OUT:    Bulb: 30 mL    Indwelling Catheter - Urethral: 1840 mL    Other: 40 mL  Total OUT: 1910 mL    Total NET: -896 mL      LABS                        8.4    19.0  )-----------( 432      ( 11 Mar 2018 04:34 )             24.5     03-11    138  |  90<L>  |  48.0<H>  ----------------------------<  122<H>  4.4   |  37.0<H>  |  1.76<H>    Ca    8.4<L>      11 Mar 2018 04:34  Phos  3.7     03-10  Mg     2.6     03-11  TPro  6.6  /  Alb  2.5<L>  /  TBili  0.3<L>  /  DBili  x   /  AST  30  /  ALT  30  /  AlkPhos  97  03-10      Culture - Blood in AM (03.08.18 @ 16:14)    Specimen Source: .Blood Blood    Culture Results:   No growth at 48 hours    Culture - Blood in AM (03.08.18 @ 16:13)    Specimen Source: .Blood Blood    Culture Results:   No growth at 48 hours    Culture - Urine (03.04.18 @ 05:44)    Specimen Source: .Urine Clean Catch (Midstream)    Culture Results:   No growth    Culture - Sputum . (03.04.18 @ 02:47)     Culture Results:   Numerous Serratia marcescens  Numerous Pseudomonas aeruginosa  Numerous Routine respiratory marquis present       < from: US Duplex Venous Upper Ext Ltd, Right (03.11.18 @ 12:05) >  IMPRESSION:   Floating nonocclusive thrombus in the right axillary vein.  < end of copied text >      < from: TTE Echo Complete w/Doppler (03.08.18 @ 10:21) >  Summary:   1. Left ventricular ejection fraction, by visual estimation, is 60 to 65%.   2. Normal global left ventricular systolic function.   3. Large pleural effusion in the left lateral region.   4. Trivial pericardial effusion.   5. Mild-moderate tricuspid regurgitation.  < end of copied text >      < from: Xray Chest 1 View- PORTABLE-Routine (03.11.18 @ 06:20) >  FINDINGS:  Single frontal view of the chest demonstrates lines and tubes are unchanged. Improved bilateral lower lobe airspace disease. The cardiomediastinal silhouette is normal. No acute osseous abnormalities. Overlying EKG leads and wires arenoted. Tiny bilateral pleural effusions.  IMPRESSION: Improved bilateral lower lobe airspace disease. Tiny bilateral pleural effusions.  < end of copied text >    3/12/18 CXR pending        MEDICATIONS  (STANDING):  cefepime  IVPB 2000 milliGRAM(s) IV Intermittent every 12 hours  chlorhexidine 0.12% Liquid 15 milliLiter(s) Swish and Spit two times a day  diltiazem Infusion 7.5 mG/Hr (7.5 mL/Hr) IV Continuous <Continuous>  enoxaparin Injectable 40 milliGRAM(s) SubCutaneous daily  insulin lispro (HumaLOG) corrective regimen sliding scale   SubCutaneous every 6 hours  levalbuterol Inhalation 1.25 milliGRAM(s) Inhalation every 6 hours  pantoprazole  Injectable 40 milliGRAM(s) IV Push daily  propofol Infusion 10 MICROgram(s)/kG/Min (3.24 mL/Hr) IV Continuous <Continuous>    MEDICATIONS  (PRN):  artificial  tears Solution 1 Drop(s) Both EYES three times a day PRN Dry Eyes  bisacodyl Suppository 10 milliGRAM(s) Rectal daily PRN Constipation  fentaNYL    Injectable 50 MICROGram(s) IV Push every 3 hours PRN sedation off paralytic      Allergies:  No Known Allergies        Physical Exam:   Constitutional: thin, ill appearing  HEENT: + ETT, + NGT, copious secretions, audible "leak" heard when pt's head turned to right but eliminated if in neutral/forward looking position  Neck: + RIJ TLC site C/D/I   Respiratory: Breath Sounds equal & clear bilaterally to auscultation  Cardiovascular: tachycardic, irregularly irregular, normal S1, S2; no murmurs or rub  Chest: + LSC TLC site C/D/I, right CT to bulb suction, thoracotomy C/D/I without heat or redness  Abdomen/GI: Soft, non-tender, non distended, hyperactive bowel sounds, + J-tube. port holes x 5 C/D/I with steris, no heat or redness  : castro in situ to gravity drainage  Extremities: no peripheral edema except distal RUE/right hand, no cyanosis, no clubbing.   Vascular: Equal and normal pulses: 2+ peripheral pulses throughout  Neurological: sedated, arouses with sedation vacation (weakly moves right hand and both feet but not left hand)  Psychiatric: sedated  Skin: warm, dry, well perfused, no rashes    Critical care time spent: __42__minutes (reviewing chart including medication, labs and imaging results, discussions with interdisciplinary team, discussing goals of care/advanced directives, counseling patient and/or family, non-inclusive of procedures) Brief Hospital Course:   2/26 s/p robotic [facility] Mehran Esophagogastrectomy and jejunostomy tube placement.   2/27 J tube placement confirmed on gastrograffin study  2/28 Right pleural chest tube dressing saturated, removed at bedside, likely leaking around tube site without obvious drainage when evaluated.   3/1 CXR ? change in right pleural space, CT chest done without significant findings as per Dr. Blanco, NGT secured to suction, CXR improved, patient feeling better   3/3 - worsening abdominal pain, CT chest suspicious for leak, some free IP air as expected after abdominal surgery, less than on CT performed 3/1, CT Abd- no acute abdominal findings on preliminary report.    3/4 Gram negative rods in sputum febrile to 101.9 on vanc zosyn   3/5 ID consulted, presumed PNA, zosyn continues, vanco d/c'd  3/6 MBS done, cleared for dysphagia 3 with nectar thick liquids  3/8 aspiration event resulting in PEA arrest, ACLS started, CPR x 4 minutes before ROSC, bedside bronch (small hole in trachea per Dr Blanco), hypercapnia, refractory hypoxemia requiring NO  3/9 reintubated with 9.0 ETT (from 8.0), vent settings adjusted with significant improvement in hypercapnic respiratory failure/acidemia  3/11 nimbex and versed d/c'd, lasix infusion decreased then d/c'd, diamox given, Melchor weaned off, NGT d/c'd       Subjective/ROS: unable to obtain (intubated & sedated)      Patient is a 57y old  Male who presents with a chief complaint of Esophageal Cancer (12 Feb 2018 10:28)    HPI:  Pt is a 57 y.o male with esophageal cancer diagnosed 5 months ago with recent unintentional weight loss of about 40 lbs now scheduled for esophagogastroduodenoscopy (EGD) Robotic [place] Mehran esophagogastrectomy. (12 Feb 2018 09:27)    PAST MEDICAL & SURGICAL HISTORY:  Esophageal cancer  Weight loss, unintentional  Hodgkins lymphoma: 25 years ago  Acid reflux disease  No significant past surgical history    FAMILY HISTORY:  Family history of prostate cancer in father (Father)      Vitals   ICU Vital Signs Last 24 Hrs  T(C): 37.7 (12 Mar 2018 01:00), Max: 37.7 (12 Mar 2018 00:45)  T(F): 99.9 (12 Mar 2018 01:00), Max: 99.9 (12 Mar 2018 00:45)  HR: 142 (12 Mar 2018 01:30) (82 - 149), a-fib  ABP: 107/56 (12 Mar 2018 01:30) (96/49 - 163/75)  ABP(mean): 70 (12 Mar 2018 01:30) (63 - 105)  RR: 20 (12 Mar 2018 01:30) (16 - 36)  SpO2: 94% (12 Mar 2018 01:30) (91% - 100%)      VENT SETTINGS   Mode: CPAP with PS  FiO2: 60  PEEP: 8  PS: 12  MAP: 12    ABG - ( 12 Mar 2018 00:03 )  pH: 7.42  /  pCO2: 57    /  pO2: 74    / HCO3: 35    / Base Excess: 11.3  /  SaO2: 94          I&O's Detail    10 Mar 2018 06:01  -  11 Mar 2018 07:00  --------------------------------------------------------  IN:    cisatracurium Infusion: 9.7 mL    Enteral Tube Flush: 80 mL    furosemide Infusion: 35 mL    furosemide Infusion: 20 mL    midazolam Infusion: 18 mL    Other: 50 mL    propofol Infusion: 47.9 mL    Solution: 100 mL    Solution: 300 mL  Total IN: 660.6 mL    OUT:    Bulb: 30 mL    Indwelling Catheter - Urethral: 3255 mL    Other: 20 mL    Stool: 5 mL  Total OUT: 3310 mL    Total NET: -2649.4 mL      11 Mar 2018 07:01  -  12 Mar 2018 02:00  --------------------------------------------------------  IN:    diltiazem Infusion: 25 mL    diltiazem Infusion: 100 mL    Enteral Tube Flush: 20 mL    Jevity: 80 mL    Lactated Ringers IV Bolus: 250 mL    Other: 20 mL    propofol Infusion: 269 mL    Solution: 100 mL    Solution: 150 mL  Total IN: 1014 mL    OUT:    Bulb: 30 mL    Indwelling Catheter - Urethral: 1840 mL    Other: 40 mL  Total OUT: 1910 mL    Total NET: -896 mL      LABS                        8.4    19.0  )-----------( 432      ( 11 Mar 2018 04:34 )             24.5     03-11    138  |  90<L>  |  48.0<H>  ----------------------------<  122<H>  4.4   |  37.0<H>  |  1.76<H>    Ca    8.4<L>      11 Mar 2018 04:34  Phos  3.7     03-10  Mg     2.6     03-11  TPro  6.6  /  Alb  2.5<L>  /  TBili  0.3<L>  /  DBili  x   /  AST  30  /  ALT  30  /  AlkPhos  97  03-10      Culture - Blood in AM (03.08.18 @ 16:14)    Specimen Source: .Blood Blood    Culture Results:   No growth at 48 hours    Culture - Blood in AM (03.08.18 @ 16:13)    Specimen Source: .Blood Blood    Culture Results:   No growth at 48 hours    Culture - Urine (03.04.18 @ 05:44)    Specimen Source: .Urine Clean Catch (Midstream)    Culture Results:   No growth    Culture - Sputum . (03.04.18 @ 02:47)     Culture Results:   Numerous Serratia marcescens  Numerous Pseudomonas aeruginosa  Numerous Routine respiratory marquis present       < from: US Duplex Venous Upper Ext Ltd, Right (03.11.18 @ 12:05) >  IMPRESSION:   Floating nonocclusive thrombus in the right axillary vein.  < end of copied text >      < from: TTE Echo Complete w/Doppler (03.08.18 @ 10:21) >  Summary:   1. Left ventricular ejection fraction, by visual estimation, is 60 to 65%.   2. Normal global left ventricular systolic function.   3. Large pleural effusion in the left lateral region.   4. Trivial pericardial effusion.   5. Mild-moderate tricuspid regurgitation.  < end of copied text >      < from: Xray Chest 1 View- PORTABLE-Routine (03.11.18 @ 06:20) >  FINDINGS:  Single frontal view of the chest demonstrates lines and tubes are unchanged. Improved bilateral lower lobe airspace disease. The cardiomediastinal silhouette is normal. No acute osseous abnormalities. Overlying EKG leads and wires arenoted. Tiny bilateral pleural effusions.  IMPRESSION: Improved bilateral lower lobe airspace disease. Tiny bilateral pleural effusions.  < end of copied text >    3/12/18 CXR pending        MEDICATIONS  (STANDING):  cefepime  IVPB 2000 milliGRAM(s) IV Intermittent every 12 hours  chlorhexidine 0.12% Liquid 15 milliLiter(s) Swish and Spit two times a day  diltiazem Infusion 7.5 mG/Hr (7.5 mL/Hr) IV Continuous <Continuous>  enoxaparin Injectable 40 milliGRAM(s) SubCutaneous daily  insulin lispro (HumaLOG) corrective regimen sliding scale   SubCutaneous every 6 hours  levalbuterol Inhalation 1.25 milliGRAM(s) Inhalation every 6 hours  pantoprazole  Injectable 40 milliGRAM(s) IV Push daily  propofol Infusion 10 MICROgram(s)/kG/Min (3.24 mL/Hr) IV Continuous <Continuous>    MEDICATIONS  (PRN):  artificial  tears Solution 1 Drop(s) Both EYES three times a day PRN Dry Eyes  bisacodyl Suppository 10 milliGRAM(s) Rectal daily PRN Constipation  fentaNYL    Injectable 50 MICROGram(s) IV Push every 3 hours PRN sedation off paralytic      Allergies:  No Known Allergies        Physical Exam:   Constitutional: thin, ill appearing  HEENT: + ETT, + NGT, copious secretions, audible "leak" heard when pt's head turned to right but eliminated if in neutral/forward looking position  Respiratory: Breath Sounds equal & clear bilaterally to auscultation  Cardiovascular: tachycardic, irregularly irregular, normal S1, S2; no murmurs or rub  Chest: + LSC TLC site C/D/I, right CT to bulb suction, thoracotomy C/D/I without heat or redness  Abdomen/GI: Soft, non-tender, non distended, hyperactive bowel sounds, + J-tube. port holes x 5 C/D/I with steris, no heat or redness  : castro in situ to gravity drainage  Extremities: no peripheral edema except distal RUE/right hand, no cyanosis, no clubbing.   Vascular: Equal and normal pulses: 2+ peripheral pulses throughout  Neurological: sedated, arouses with sedation vacation (weakly moves right hand and both feet but not left hand)  Psychiatric: sedated  Skin: warm, dry, well perfused, no rashes    Critical care time spent: __42__minutes (reviewing chart including medication, labs and imaging results, discussions with interdisciplinary team, discussing goals of care/advanced directives, counseling patient and/or family, non-inclusive of procedures)

## 2018-03-12 NOTE — PROGRESS NOTE ADULT - PROBLEM SELECTOR PLAN 2
no change in rate with IV metoprolol  rate initially improved with IV diltiazem then infusion but remains tachycardic despite up titration of infusion and amiodarone bolus  keep potassium >4 and magnesium > 2 (3/12 labs pending)  will d/w team on am rounds re: EP eval and possible DCCV (though BP remains adequate off pressors)

## 2018-03-12 NOTE — PROGRESS NOTE ADULT - ASSESSMENT
Pt is a 57 y.o male with esophageal cancer diagnosed 5 months ago with recent unintentional weight loss of about 40 lbs now scheduled for esophagogastroduodenoscopy (EGD) Robotic [place] Mehran esophagogastrectomy.  complicated ICU admission   cont care per CICU, agree with txt abx for pna   close monitoring of resp status, vent settings adjusted as needed, pfr indicating ARDS , supportive measures   rec bronch per dr thomas , d/w dr cloud directly   restart tube feeds   ok with leaving ngt out for now   cardiac arrythmia, EP consulted, cardiac meds per CICU, follow and replace electrolytes   surgery will cont to follow

## 2018-03-12 NOTE — PROGRESS NOTE ADULT - ASSESSMENT
57 year old Male with PMH  Hodgkin's lymphoma, GERD, esophageal cancer.   2/26 s/p robotic Yfn Mehran Esophagogastrectomy and jejunostomy tube placement.   2/27 J tube placement confirmed on gastrograffin study  2/28 Right pleural chest tube dressing saturated, removed at bedside, likely leaking around tube site without obvious drainage when evaluated.   3/1 CXR ? change in right pleural space, CT chest done without significant findings as per Dr. Blanco, NGT secured to suction, CXR improved, patient feeling better   3/3 - worsening abdominal pain, CT chest suspicious for leak, some free IP air as expected after abdominal surgery, less than on CT performed 3/1, CT Abd- no acute abdominal findings on preliminary report.    3/4 serratia and psuedomonas PNA  3/6 MBS done, cleared for dysphagia 3 with nectar thick liquids  3/8 aspiration event resulting in PEA arrest, ACLS started, CPR x 4 minutes before ROSC, bedside bronch (small hole in trachea per Dr Blanco), hypercapnia, refractory hypoxemia/ARDS requiring NO and critical care medicine consult  3/9 reintubated with 9.0 ETT (from 8.0), vent settings adjusted with significant improvement in hypercapnic respiratory failure/acidemia  3/11 nimbex and versed d/c'd, lasix infusion decreased then d/c'd, diamox given, hypoxemia markedly improved allowing Melchor to be weaned off, NGT d/c'd.

## 2018-03-12 NOTE — CONSULT NOTE ADULT - SUBJECTIVE AND OBJECTIVE BOX
57 year old Male with PMH  Hodgkin's lymphoma, GERD, esophageal cancer s/p robotic [place] Mehran Esophagogastrectomy and jejunostomy tube placement 2/26/18 complicated by serratia and psuedomonas PNA and aspiration event resulting in PEA arrest with CPR x 4 minutes before ROSC, and ARDS. Now with atrial flutter w/ hemodynamically stable RVR, refractory to medical rate control.       PAST MEDICAL & SURGICAL HISTORY:  Esophageal cancer  Weight loss, unintentional  Hodgkins lymphoma: 25 years ago  Acid reflux disease  No significant past surgical history      REVIEW OF SYSTEMS:  Unable to obtain - pt intubated and sedated    MEDICATIONS  (STANDING):  cefepime  IVPB 2000 milliGRAM(s) IV Intermittent every 12 hours  chlorhexidine 0.12% Liquid 15 milliLiter(s) Swish and Spit two times a day  diltiazem Infusion 7.5 mG/Hr (7.5 mL/Hr) IV Continuous <Continuous>  heparin  Infusion.  Unit(s)/Hr (10 mL/Hr) IV Continuous <Continuous>  levalbuterol Inhalation 1.25 milliGRAM(s) Inhalation every 6 hours  pantoprazole  Injectable 40 milliGRAM(s) IV Push daily  propofol Infusion 10 MICROgram(s)/kG/Min (3.24 mL/Hr) IV Continuous <Continuous>    MEDICATIONS  (PRN):  artificial  tears Solution 1 Drop(s) Both EYES three times a day PRN Dry Eyes  bisacodyl Suppository 10 milliGRAM(s) Rectal daily PRN Constipation  fentaNYL    Injectable 50 MICROGram(s) IV Push every 3 hours PRN sedation off paralytic      Allergies  No Known Allergies      FAMILY HISTORY:  Family history of prostate cancer in father (Father)      Vital Signs Last 24 Hrs  T(C): 38.2 (12 Mar 2018 15:00), Max: 38.2 (12 Mar 2018 14:00)  T(F): 100.8 (12 Mar 2018 15:00), Max: 100.8 (12 Mar 2018 14:00)  HR: 138 (12 Mar 2018 15:54) (137 - 145)  BP: 111/63 (12 Mar 2018 08:20) (111/63 - 111/63)  BP(mean): 81 (12 Mar 2018 08:20) (81 - 81)  RR: 23 (12 Mar 2018 15:54) (16 - 41)  SpO2: 94% (12 Mar 2018 15:54) (88% - 99%)      Physical Exam:  Constitutional: NAD  Neck: supple, No JVD  Cardiovascular: +S1S2 RRR, no murmurs, rubs, gallops   Pulmonary: CTA b/l, unlabored, no wheezes, rales. rhonci  Abdomen: +BS, soft NTND  Extremities: no edema b/l, +distal pulses b/l  Neuro: non focal, speech clear, JOHNSON x 4    LABS:                        8.5    11.4  )-----------( 396      ( 12 Mar 2018 03:23 )             25.9   03-12    142  |  96<L>  |  49.0<H>  ----------------------------<  106  4.0   |  35.0<H>  |  1.49<H>    Ca    8.6      12 Mar 2018 03:23  Mg     2.7     03-12      PTT - ( 12 Mar 2018 10:18 )  PTT:27.0 sec 57 year old Male with PMH  Hodgkin's lymphoma, GERD, esophageal cancer s/p robotic [place] Mehran Esophagogastrectomy and jejunostomy tube placement 2/26/18 complicated by serratia and psuedomonas PNA and aspiration event resulting in PEA arrest with CPR x 4 minutes before ROSC, and ARDS. Now with atrial flutter w/ hemodynamically stable RVR, refractory to medical rate control.       PAST MEDICAL & SURGICAL HISTORY:  Esophageal cancer  Weight loss, unintentional  Hodgkins lymphoma: 25 years ago  Acid reflux disease  No significant past surgical history      REVIEW OF SYSTEMS:  Unable to obtain - pt intubated and sedated    MEDICATIONS  (STANDING):  cefepime  IVPB 2000 milliGRAM(s) IV Intermittent every 12 hours  chlorhexidine 0.12% Liquid 15 milliLiter(s) Swish and Spit two times a day  diltiazem Infusion 7.5 mG/Hr (7.5 mL/Hr) IV Continuous <Continuous>  heparin  Infusion.  Unit(s)/Hr (10 mL/Hr) IV Continuous <Continuous>  levalbuterol Inhalation 1.25 milliGRAM(s) Inhalation every 6 hours  pantoprazole  Injectable 40 milliGRAM(s) IV Push daily  propofol Infusion 10 MICROgram(s)/kG/Min (3.24 mL/Hr) IV Continuous <Continuous>    MEDICATIONS  (PRN):  artificial  tears Solution 1 Drop(s) Both EYES three times a day PRN Dry Eyes  bisacodyl Suppository 10 milliGRAM(s) Rectal daily PRN Constipation  fentaNYL    Injectable 50 MICROGram(s) IV Push every 3 hours PRN sedation off paralytic      Allergies  No Known Allergies      FAMILY HISTORY:  Family history of prostate cancer in father (Father)      Vital Signs Last 24 Hrs  T(C): 38.2 (12 Mar 2018 15:00), Max: 38.2 (12 Mar 2018 14:00)  T(F): 100.8 (12 Mar 2018 15:00), Max: 100.8 (12 Mar 2018 14:00)  HR: 138 (12 Mar 2018 15:54) (137 - 145)  BP: 111/63 (12 Mar 2018 08:20) (111/63 - 111/63)  BP(mean): 81 (12 Mar 2018 08:20) (81 - 81)  RR: 23 (12 Mar 2018 15:54) (16 - 41)  SpO2: 94% (12 Mar 2018 15:54) (88% - 99%)      Physical Exam:  Constitutional: intubated & sedated, NAD  Neck: supple, No JVD  Cardiovascular: tachycardic, regular, no murmurs, rubs, gallops   Pulmonary: course breath sounds b/l (anterior ausc only)  Abdomen: +BS, soft NTND  Extremities: no edema b/l, +distal pulses b/l  Neuro: intubated and sedated.     LABS:                        8.5    11.4  )-----------( 396      ( 12 Mar 2018 03:23 )             25.9   03-12    142  |  96<L>  |  49.0<H>  ----------------------------<  106  4.0   |  35.0<H>  |  1.49<H>    Ca    8.6      12 Mar 2018 03:23  Mg     2.7     03-12      PTT - ( 12 Mar 2018 10:18 )  PTT:27.0 sec

## 2018-03-12 NOTE — CONSULT NOTE ADULT - ASSESSMENT
57 year old Male with PMH  Hodgkin's lymphoma, GERD, esophageal cancer s/p robotic [place] Mehran Esophagogastrectomy and jejunostomy tube placement 2/26/18 complicated by serratia and psuedomonas PNA and aspiration event resulting in PEA arrest with CPR x 4 minutes before ROSC, and ARDS. Now with atrial flutter w/ hemodynamically stable RVR since 3/11/18, refractory to medical rate control.     Recommendations:   Pt has been in AFL for ~24 hours with no anticoagulation and is likely not a candidate for BENI.   Additionally, if DCCV were able to be performed, pt would need to be on therapeutic a/c for 30 days uninterrupted post cardioversion.   Will d/w Dr. Dockery.

## 2018-03-12 NOTE — CONSULT NOTE ADULT - ATTENDING COMMENTS
57 year old Male with PMH  Hodgkin's lymphoma, GERD, esophageal cancer s/p robotic [place] Mehran Esophagogastrectomy and jejunostomy tube placement 2/26/18 complicated by serratia and psuedomonas PNA and aspiration event resulting in PEA arrest with CPR x 4 minutes before ROSC, and ARDS. Now with atrial flutter w/ hemodynamically stable RVR, refractory to medical rate control. .      Imp: Atypical atrial flutter with RVR   Recc:   Continue to maximize rate control strategy ; consider Esmolol if bp tenuous on Diltiazem infusion , Digoxin  follow ScR  Need GI to guide regarding : (1) systemic AC (2)  Will need guidance regarding if high risk BENI -given hx of gastro-esophagectomy is an option -? EGD to better qualify this as an option.  d/w team 57 year old Male with PMH  Hodgkin's lymphoma, GERD, esophageal cancer s/p robotic [place] Mehran Esophagogastrectomy and jejunostomy tube placement 2/26/18 complicated by serratia and psuedomonas PNA and aspiration event resulting in PEA arrest with CPR x 4 minutes before ROSC, and ARDS. Now with atrial flutter w/ hemodynamically stable RVR, refractory to medical rate control. .      Imp: Atypical atrial flutter with RVR   Recc:   Continue to maximize rate control strategy ; consider Esmolol if bp tenuous on Diltiazem infusion , Digoxin  follow ScR  Need GI to guide regarding : (1) systemic AC (2)  Will need guidance regarding if high risk BENI -will d/w with Dr Blanco   d/w team

## 2018-03-13 LAB
ALLERGY+IMMUNOLOGY DIAG STUDY NOTE: SIGNIFICANT CHANGE UP
ANION GAP SERPL CALC-SCNC: 12 MMOL/L — SIGNIFICANT CHANGE UP (ref 5–17)
ANION GAP SERPL CALC-SCNC: 13 MMOL/L — SIGNIFICANT CHANGE UP (ref 5–17)
APTT BLD: 33.4 SEC — SIGNIFICANT CHANGE UP (ref 27.5–37.4)
BASE EXCESS BLDV CALC-SCNC: -0.7 MMOL/L — SIGNIFICANT CHANGE UP (ref -2–2)
BLD GP AB SCN SERPL QL: ABNORMAL
BLD GP AB SCN SERPL QL: SIGNIFICANT CHANGE UP
BUN SERPL-MCNC: 55 MG/DL — HIGH (ref 8–20)
BUN SERPL-MCNC: 66 MG/DL — HIGH (ref 8–20)
CALCIUM SERPL-MCNC: 8.4 MG/DL — LOW (ref 8.6–10.2)
CALCIUM SERPL-MCNC: 8.5 MG/DL — LOW (ref 8.6–10.2)
CHLORIDE SERPL-SCNC: 98 MMOL/L — SIGNIFICANT CHANGE UP (ref 98–107)
CHLORIDE SERPL-SCNC: 99 MMOL/L — SIGNIFICANT CHANGE UP (ref 98–107)
CO2 SERPL-SCNC: 33 MMOL/L — HIGH (ref 22–29)
CO2 SERPL-SCNC: 33 MMOL/L — HIGH (ref 22–29)
CREAT SERPL-MCNC: 1.78 MG/DL — HIGH (ref 0.5–1.3)
CREAT SERPL-MCNC: 2.68 MG/DL — HIGH (ref 0.5–1.3)
DIR ANTIGLOB POLYSPECIFIC INTERPRETATION: SIGNIFICANT CHANGE UP
GAS PNL BLDA: SIGNIFICANT CHANGE UP
GAS PNL BLDV: SIGNIFICANT CHANGE UP
GLUCOSE BLDC GLUCOMTR-MCNC: 277 MG/DL — HIGH (ref 70–99)
GLUCOSE BLDC GLUCOMTR-MCNC: <30 MG/DL — CRITICAL LOW (ref 70–99)
GLUCOSE SERPL-MCNC: 130 MG/DL — HIGH (ref 70–115)
GLUCOSE SERPL-MCNC: 142 MG/DL — HIGH (ref 70–115)
HCO3 BLDV-SCNC: 23 MMOL/L — SIGNIFICANT CHANGE UP (ref 21–29)
HCT VFR BLD CALC: 25.1 % — LOW (ref 42–52)
HCT VFR BLD CALC: 27 % — LOW (ref 42–52)
HGB BLD-MCNC: 8 G/DL — LOW (ref 14–18)
HGB BLD-MCNC: 8.6 G/DL — LOW (ref 14–18)
LACTATE SERPL-SCNC: 3 MMOL/L — HIGH (ref 0.5–2)
MAGNESIUM SERPL-MCNC: 2.8 MG/DL — HIGH (ref 1.6–2.6)
MAGNESIUM SERPL-MCNC: 2.9 MG/DL — HIGH (ref 1.6–2.6)
MCHC RBC-ENTMCNC: 28.7 PG — SIGNIFICANT CHANGE UP (ref 27–31)
MCHC RBC-ENTMCNC: 28.8 PG — SIGNIFICANT CHANGE UP (ref 27–31)
MCHC RBC-ENTMCNC: 31.9 G/DL — LOW (ref 32–36)
MCHC RBC-ENTMCNC: 31.9 G/DL — LOW (ref 32–36)
MCV RBC AUTO: 90 FL — SIGNIFICANT CHANGE UP (ref 80–94)
MCV RBC AUTO: 90.3 FL — SIGNIFICANT CHANGE UP (ref 80–94)
PH BLDV: 7.05 — CRITICAL LOW (ref 7.32–7.43)
PLATELET # BLD AUTO: 312 K/UL — SIGNIFICANT CHANGE UP (ref 150–400)
PLATELET # BLD AUTO: 388 K/UL — SIGNIFICANT CHANGE UP (ref 150–400)
PO2 BLDV: 37 MMHG — SIGNIFICANT CHANGE UP (ref 25–45)
POTASSIUM SERPL-MCNC: 4.2 MMOL/L — SIGNIFICANT CHANGE UP (ref 3.5–5.3)
POTASSIUM SERPL-MCNC: 4.4 MMOL/L — SIGNIFICANT CHANGE UP (ref 3.5–5.3)
POTASSIUM SERPL-SCNC: 4.2 MMOL/L — SIGNIFICANT CHANGE UP (ref 3.5–5.3)
POTASSIUM SERPL-SCNC: 4.4 MMOL/L — SIGNIFICANT CHANGE UP (ref 3.5–5.3)
PROCALCITONIN SERPL-MCNC: 127.49 NG/ML — HIGH (ref 0–0.04)
RBC # BLD: 2.79 M/UL — LOW (ref 4.6–6.2)
RBC # BLD: 2.99 M/UL — LOW (ref 4.6–6.2)
RBC # FLD: 15.2 % — SIGNIFICANT CHANGE UP (ref 11–15.6)
RBC # FLD: 15.2 % — SIGNIFICANT CHANGE UP (ref 11–15.6)
SAO2 % BLDV: 51 % — SIGNIFICANT CHANGE UP
SODIUM SERPL-SCNC: 144 MMOL/L — SIGNIFICANT CHANGE UP (ref 135–145)
SODIUM SERPL-SCNC: 144 MMOL/L — SIGNIFICANT CHANGE UP (ref 135–145)
TYPE + AB SCN PNL BLD: SIGNIFICANT CHANGE UP
TYPE + AB SCN PNL BLD: SIGNIFICANT CHANGE UP
WBC # BLD: 12.5 K/UL — HIGH (ref 4.8–10.8)
WBC # BLD: 6.2 K/UL — SIGNIFICANT CHANGE UP (ref 4.8–10.8)
WBC # FLD AUTO: 12.5 K/UL — HIGH (ref 4.8–10.8)
WBC # FLD AUTO: 6.2 K/UL — SIGNIFICANT CHANGE UP (ref 4.8–10.8)

## 2018-03-13 PROCEDURE — 93010 ELECTROCARDIOGRAM REPORT: CPT

## 2018-03-13 PROCEDURE — 99233 SBSQ HOSP IP/OBS HIGH 50: CPT

## 2018-03-13 PROCEDURE — 99291 CRITICAL CARE FIRST HOUR: CPT

## 2018-03-13 PROCEDURE — 71045 X-RAY EXAM CHEST 1 VIEW: CPT | Mod: 26

## 2018-03-13 PROCEDURE — 74018 RADEX ABDOMEN 1 VIEW: CPT | Mod: 26,59

## 2018-03-13 PROCEDURE — 99291 CRITICAL CARE FIRST HOUR: CPT | Mod: 24

## 2018-03-13 PROCEDURE — 31624 DX BRONCHOSCOPE/LAVAGE: CPT | Mod: 78

## 2018-03-13 PROCEDURE — 86077 PHYS BLOOD BANK SERV XMATCH: CPT

## 2018-03-13 PROCEDURE — 36556 INSERT NON-TUNNEL CV CATH: CPT | Mod: 58,RT

## 2018-03-13 RX ORDER — SODIUM CHLORIDE 9 MG/ML
1000 INJECTION, SOLUTION INTRAVENOUS
Qty: 0 | Refills: 0 | Status: DISCONTINUED | OUTPATIENT
Start: 2018-03-13 | End: 2018-03-14

## 2018-03-13 RX ORDER — AZITHROMYCIN 500 MG/1
TABLET, FILM COATED ORAL
Qty: 0 | Refills: 0 | Status: DISCONTINUED | OUTPATIENT
Start: 2018-03-13 | End: 2018-03-14

## 2018-03-13 RX ORDER — THIAMINE MONONITRATE (VIT B1) 100 MG
100 TABLET ORAL
Qty: 0 | Refills: 0 | Status: DISCONTINUED | OUTPATIENT
Start: 2018-03-13 | End: 2018-03-14

## 2018-03-13 RX ORDER — CALCIUM CHLORIDE
1000 POWDER (GRAM) MISCELLANEOUS ONCE
Qty: 0 | Refills: 0 | Status: COMPLETED | OUTPATIENT
Start: 2018-03-13 | End: 2018-03-13

## 2018-03-13 RX ORDER — AZITHROMYCIN 500 MG/1
500 TABLET, FILM COATED ORAL ONCE
Qty: 0 | Refills: 0 | Status: COMPLETED | OUTPATIENT
Start: 2018-03-13 | End: 2018-03-13

## 2018-03-13 RX ORDER — ASCORBIC ACID 60 MG
1500 TABLET,CHEWABLE ORAL EVERY 6 HOURS
Qty: 0 | Refills: 0 | Status: DISCONTINUED | OUTPATIENT
Start: 2018-03-13 | End: 2018-03-14

## 2018-03-13 RX ORDER — ALBUMIN HUMAN 25 %
250 VIAL (ML) INTRAVENOUS ONCE
Qty: 0 | Refills: 0 | Status: COMPLETED | OUTPATIENT
Start: 2018-03-13 | End: 2018-03-13

## 2018-03-13 RX ORDER — HYDROCORTISONE 20 MG
50 TABLET ORAL EVERY 6 HOURS
Qty: 0 | Refills: 0 | Status: DISCONTINUED | OUTPATIENT
Start: 2018-03-13 | End: 2018-03-14

## 2018-03-13 RX ORDER — FENTANYL CITRATE 50 UG/ML
50 INJECTION INTRAVENOUS ONCE
Qty: 0 | Refills: 0 | Status: DISCONTINUED | OUTPATIENT
Start: 2018-03-13 | End: 2018-03-13

## 2018-03-13 RX ORDER — VASOPRESSIN 20 [USP'U]/ML
0.6 INJECTION INTRAVENOUS
Qty: 100 | Refills: 0 | Status: DISCONTINUED | OUTPATIENT
Start: 2018-03-13 | End: 2018-03-13

## 2018-03-13 RX ORDER — HEPARIN SODIUM 5000 [USP'U]/ML
1200 INJECTION INTRAVENOUS; SUBCUTANEOUS
Qty: 25000 | Refills: 0 | Status: DISCONTINUED | OUTPATIENT
Start: 2018-03-13 | End: 2018-03-14

## 2018-03-13 RX ORDER — PHENYLEPHRINE HYDROCHLORIDE 10 MG/ML
0.25 INJECTION INTRAVENOUS
Qty: 80 | Refills: 0 | Status: DISCONTINUED | OUTPATIENT
Start: 2018-03-13 | End: 2018-03-13

## 2018-03-13 RX ORDER — MICAFUNGIN SODIUM 100 MG/1
100 INJECTION, POWDER, LYOPHILIZED, FOR SOLUTION INTRAVENOUS EVERY 24 HOURS
Qty: 0 | Refills: 0 | Status: DISCONTINUED | OUTPATIENT
Start: 2018-03-14 | End: 2018-03-14

## 2018-03-13 RX ORDER — VECURONIUM BROMIDE 20 MG/1
10 INJECTION, POWDER, FOR SOLUTION INTRAVENOUS ONCE
Qty: 0 | Refills: 0 | Status: COMPLETED | OUTPATIENT
Start: 2018-03-13 | End: 2018-03-13

## 2018-03-13 RX ORDER — AZITHROMYCIN 500 MG/1
500 TABLET, FILM COATED ORAL EVERY 24 HOURS
Qty: 0 | Refills: 0 | Status: DISCONTINUED | OUTPATIENT
Start: 2018-03-14 | End: 2018-03-14

## 2018-03-13 RX ORDER — SODIUM BICARBONATE 1 MEQ/ML
100 SYRINGE (ML) INTRAVENOUS ONCE
Qty: 0 | Refills: 0 | Status: COMPLETED | OUTPATIENT
Start: 2018-03-13 | End: 2018-03-13

## 2018-03-13 RX ORDER — EPINEPHRINE 0.3 MG/.3ML
0.05 INJECTION INTRAMUSCULAR; SUBCUTANEOUS
Qty: 4 | Refills: 0 | Status: DISCONTINUED | OUTPATIENT
Start: 2018-03-13 | End: 2018-03-14

## 2018-03-13 RX ORDER — CISATRACURIUM BESYLATE 2 MG/ML
1 INJECTION INTRAVENOUS
Qty: 200 | Refills: 0 | Status: DISCONTINUED | OUTPATIENT
Start: 2018-03-13 | End: 2018-03-14

## 2018-03-13 RX ORDER — NOREPINEPHRINE BITARTRATE/D5W 8 MG/250ML
0.05 PLASTIC BAG, INJECTION (ML) INTRAVENOUS
Qty: 8 | Refills: 0 | Status: DISCONTINUED | OUTPATIENT
Start: 2018-03-13 | End: 2018-03-14

## 2018-03-13 RX ORDER — VASOPRESSIN 20 [USP'U]/ML
0.06 INJECTION INTRAVENOUS
Qty: 100 | Refills: 0 | Status: DISCONTINUED | OUTPATIENT
Start: 2018-03-13 | End: 2018-03-14

## 2018-03-13 RX ORDER — SODIUM CHLORIDE 9 MG/ML
1000 INJECTION, SOLUTION INTRAVENOUS
Qty: 0 | Refills: 0 | Status: DISCONTINUED | OUTPATIENT
Start: 2018-03-13 | End: 2018-03-13

## 2018-03-13 RX ORDER — SODIUM CHLORIDE 9 MG/ML
500 INJECTION, SOLUTION INTRAVENOUS ONCE
Qty: 0 | Refills: 0 | Status: COMPLETED | OUTPATIENT
Start: 2018-03-13 | End: 2018-03-13

## 2018-03-13 RX ORDER — MICAFUNGIN SODIUM 100 MG/1
INJECTION, POWDER, LYOPHILIZED, FOR SOLUTION INTRAVENOUS
Qty: 0 | Refills: 0 | Status: DISCONTINUED | OUTPATIENT
Start: 2018-03-13 | End: 2018-03-14

## 2018-03-13 RX ORDER — DEXTROSE 50 % IN WATER 50 %
100 SYRINGE (ML) INTRAVENOUS ONCE
Qty: 0 | Refills: 0 | Status: COMPLETED | OUTPATIENT
Start: 2018-03-13 | End: 2018-03-13

## 2018-03-13 RX ORDER — MICAFUNGIN SODIUM 100 MG/1
100 INJECTION, POWDER, LYOPHILIZED, FOR SOLUTION INTRAVENOUS ONCE
Qty: 0 | Refills: 0 | Status: COMPLETED | OUTPATIENT
Start: 2018-03-13 | End: 2018-03-13

## 2018-03-13 RX ADMIN — AZITHROMYCIN 255 MILLIGRAM(S): 500 TABLET, FILM COATED ORAL at 14:01

## 2018-03-13 RX ADMIN — FENTANYL CITRATE 50 MICROGRAM(S): 50 INJECTION INTRAVENOUS at 08:35

## 2018-03-13 RX ADMIN — FENTANYL CITRATE 50 MICROGRAM(S): 50 INJECTION INTRAVENOUS at 13:15

## 2018-03-13 RX ADMIN — LEVALBUTEROL 1.25 MILLIGRAM(S): 1.25 SOLUTION, CONCENTRATE RESPIRATORY (INHALATION) at 09:26

## 2018-03-13 RX ADMIN — PROPOFOL 3.24 MICROGRAM(S)/KG/MIN: 10 INJECTION, EMULSION INTRAVENOUS at 10:42

## 2018-03-13 RX ADMIN — CEFEPIME 100 MILLIGRAM(S): 1 INJECTION, POWDER, FOR SOLUTION INTRAMUSCULAR; INTRAVENOUS at 05:03

## 2018-03-13 RX ADMIN — FENTANYL CITRATE 50 MICROGRAM(S): 50 INJECTION INTRAVENOUS at 08:50

## 2018-03-13 RX ADMIN — Medication 100 MILLILITER(S): at 23:19

## 2018-03-13 RX ADMIN — HEPARIN SODIUM 1400 UNIT(S)/HR: 5000 INJECTION INTRAVENOUS; SUBCUTANEOUS at 18:34

## 2018-03-13 RX ADMIN — CHLORHEXIDINE GLUCONATE 15 MILLILITER(S): 213 SOLUTION TOPICAL at 18:37

## 2018-03-13 RX ADMIN — FENTANYL CITRATE 50 MICROGRAM(S): 50 INJECTION INTRAVENOUS at 03:13

## 2018-03-13 RX ADMIN — CISATRACURIUM BESYLATE 3.24 MICROGRAM(S)/KG/MIN: 2 INJECTION INTRAVENOUS at 15:27

## 2018-03-13 RX ADMIN — Medication 103 MILLIGRAM(S): at 18:52

## 2018-03-13 RX ADMIN — Medication 100 MILLIGRAM(S): at 18:17

## 2018-03-13 RX ADMIN — SODIUM CHLORIDE 125 MILLILITER(S): 9 INJECTION, SOLUTION INTRAVENOUS at 16:15

## 2018-03-13 RX ADMIN — MICAFUNGIN SODIUM 105 MILLIGRAM(S): 100 INJECTION, POWDER, LYOPHILIZED, FOR SOLUTION INTRAVENOUS at 16:31

## 2018-03-13 RX ADMIN — CEFEPIME 100 MILLIGRAM(S): 1 INJECTION, POWDER, FOR SOLUTION INTRAMUSCULAR; INTRAVENOUS at 18:00

## 2018-03-13 RX ADMIN — Medication 1000 MILLIGRAM(S): at 23:19

## 2018-03-13 RX ADMIN — HEPARIN SODIUM 1200 UNIT(S)/HR: 5000 INJECTION INTRAVENOUS; SUBCUTANEOUS at 11:51

## 2018-03-13 RX ADMIN — Medication 50 MILLIGRAM(S): at 18:14

## 2018-03-13 RX ADMIN — Medication 750 MILLILITER(S): at 15:27

## 2018-03-13 RX ADMIN — VECURONIUM BROMIDE 10 MILLIGRAM(S): 20 INJECTION, POWDER, FOR SOLUTION INTRAVENOUS at 13:13

## 2018-03-13 RX ADMIN — PHENYLEPHRINE HYDROCHLORIDE 5 MICROGRAM(S)/KG/MIN: 10 INJECTION INTRAVENOUS at 13:01

## 2018-03-13 RX ADMIN — VASOPRESSIN 3.6 UNIT(S)/MIN: 20 INJECTION INTRAVENOUS at 15:40

## 2018-03-13 RX ADMIN — FENTANYL CITRATE 50 MICROGRAM(S): 50 INJECTION INTRAVENOUS at 00:40

## 2018-03-13 RX ADMIN — CHLORHEXIDINE GLUCONATE 15 MILLILITER(S): 213 SOLUTION TOPICAL at 05:00

## 2018-03-13 RX ADMIN — LEVALBUTEROL 1.25 MILLIGRAM(S): 1.25 SOLUTION, CONCENTRATE RESPIRATORY (INHALATION) at 02:35

## 2018-03-13 RX ADMIN — Medication 100 MILLIEQUIVALENT(S): at 23:19

## 2018-03-13 RX ADMIN — PHENYLEPHRINE HYDROCHLORIDE 2.02 MICROGRAM(S)/KG/MIN: 10 INJECTION INTRAVENOUS at 18:53

## 2018-03-13 RX ADMIN — PANTOPRAZOLE SODIUM 40 MILLIGRAM(S): 20 TABLET, DELAYED RELEASE ORAL at 11:12

## 2018-03-13 RX ADMIN — Medication 5 MICROGRAM(S)/KG/MIN: at 15:00

## 2018-03-13 RX ADMIN — ENOXAPARIN SODIUM 40 MILLIGRAM(S): 100 INJECTION SUBCUTANEOUS at 05:03

## 2018-03-13 NOTE — PROCEDURE NOTE - NSPROCDETAILS_GEN_ALL_CORE
fentanyl 100 mcg IVP x 1 and versed 2 mg IVP x 1/patient pre-sedated, synchronized cardioversion performed
guidewire recovered/lumen(s) aspirated and flushed/sterile dressing applied/sterile technique, catheter placed
guidewire recovered/lumen(s) aspirated and flushed/sterile dressing applied/sterile technique, catheter placed/ultrasound guidance
sterile technique, catheter placed/lumen(s) aspirated and flushed/guidewire recovered/sterile dressing applied
sutured in place/Seldinger technique/all materials/supplies accounted for at end of procedure/location identified, draped/prepped, sterile technique used, needle inserted/introduced/connected to a pressurized flush line
sterile technique, catheter placed/guidewire recovered/ultrasound guidance/lumen(s) aspirated and flushed/sterile dressing applied
sterile dressing applied/ultrasound guidance/guidewire recovered/sterile technique, catheter placed/lumen(s) aspirated and flushed
hemostasis with direct pressure, dressing applied/sutured in place/all materials/supplies accounted for at end of procedure/Seldinger technique/location identified, draped/prepped, sterile technique used, needle inserted/introduced/connected to a pressurized flush line/positive blood return obtained via catheter

## 2018-03-13 NOTE — PROGRESS NOTE ADULT - PROBLEM SELECTOR PLAN 2
continue current management goal TV 6-7ml/kg, goal plat. pressures < 30 but difficult to know if accurate in setting of tracheal perforation, goal sat 88-92% or PaO2 55-85, continue paralytics and sedatives - prognosis poor

## 2018-03-13 NOTE — PROGRESS NOTE ADULT - NSHPATTENDINGPLANDISCUSS_GEN_ALL_CORE
Dr. Silverio Mari
Dr. Finkelstein
Dr. Mari
Dr Mari
CTICU Team

## 2018-03-13 NOTE — BRIEF OPERATIVE NOTE - OPERATION/FINDINGS
radiation changes R chest
Left mainstem hole increased in size since last bronchoscopy
distal esophogeal lesion

## 2018-03-13 NOTE — PROGRESS NOTE ADULT - PROBLEM SELECTOR PLAN 6
s/p robotic [place] Mehran Esophagogastrectomy and jejunostomy tube placement ON 2/6/18; s/p Chemo-radiation
Cr increased  will adjust antibiotics to renal dosing
Cr increased  will adjust antibiotics to renal dosing
Lovenox and SCDs for DVT prophylaxis  Protonix for GI prophylaxis
Lovenox and SCDs for DVT prophylaxis  Protonix for GI prophylaxis
NPO at this time  flush Q6H
Will connect J tube to gravity  currently NPO without tube feeds  Remain NPO until respiratory status improves
low dose tube feeds restarted  flush Q6H
Will connect J tube to gravity  currently NPO without tube feeds  Remain NPO until respiratory status improves
Lovenox and SCDs for DVT prophylaxis  Protonix for GI prophylaxis
remains NPO  J tube to gravity  flush Q6H

## 2018-03-13 NOTE — PROGRESS NOTE ADULT - PROBLEM SELECTOR PROBLEM 8
Pulseless electrical activity
Immunosuppressed due to chemotherapy
Immunosuppressed due to chemotherapy
Pulseless electrical activity

## 2018-03-13 NOTE — PROGRESS NOTE ADULT - PROBLEM SELECTOR PLAN 3
diamox PRN to assist with diuresis and metabolic alkalosis  adjust meds per CrCl  Hold nephrotoxic meds  Trend urine output  Trend BMP

## 2018-03-13 NOTE — PROGRESS NOTE ADULT - PROBLEM SELECTOR PROBLEM 6
Immunosuppressed due to chemotherapy
Malignant neoplasm of esophagus, unspecified
Immunosuppressed due to chemotherapy
Jejunostomy tube in situ
Prophylactic measure
Prophylactic measure
RENETTA (acute kidney injury)
RENETTA (acute kidney injury)
Jejunostomy tube in situ
Prophylactic measure
Jejunostomy tube in situ

## 2018-03-13 NOTE — PROCEDURE NOTE - ADDITIONAL PROCEDURE DETAILS
pt started on amiodarone infusion as discussed with Dr Dockery to assist with maintaining NSR.
venous blood gas sent for approp placement .

## 2018-03-13 NOTE — PROCEDURE NOTE - NSCVLATTEMPTSITEVASC_A_CORE
subclavian vein/left/subclavian vein
right/femoral vein
right/internal jugular
right/placement in inferior vena cava
femoral vein/left

## 2018-03-13 NOTE — PROGRESS NOTE ADULT - PROBLEM SELECTOR PLAN 1
titrate FiO2 to maintain SpO2 > 90%  Daily CXR  ABGs PRN  vent settings adjusted (increased peak flow to shorten inspiration/extend expiration)

## 2018-03-13 NOTE — PROGRESS NOTE ADULT - ASSESSMENT
57 year old Male with PMH  Hodgkin's lymphoma, GERD, esophageal cancer.   2/26 s/p robotic [place] Mehran Esophagogastrectomy and jejunostomy tube placement.   2/27 J tube placement confirmed on gastrograffin study  2/28 Right pleural chest tube dressing saturated, removed at bedside, likely leaking around tube site without obvious drainage when evaluated.   3/1 CXR ? change in right pleural space, CT chest done without significant findings as per Dr. Blanco, NGT secured to suction, CXR improved, patient feeling better   3/3 - worsening abdominal pain, CT chest suspicious for leak, some free IP air as expected after abdominal surgery, less than on CT performed 3/1, CT Abd- no acute abdominal findings on preliminary report.    3/4 serratia and psuedomonas PNA  3/6 MBS done, cleared for dysphagia 3 with nectar thick liquids  3/8 aspiration event resulting in PEA arrest, ACLS started, CPR x 4 minutes before ROSC, bedside bronch (small hole in trachea per Dr Blanco), hypercapnia, refractory hypoxemia/ARDS requiring NO and critical care medicine consult  3/9 reintubated with 9.0 ETT (from 8.0), vent settings adjusted with significant improvement in hypercapnic respiratory failure/acidemia  3/11 nimbex and versed d/c'd, lasix infusion decreased then d/c'd, diamox given, hypoxemia markedly improved allowing Melchor to be weaned off, NGT d/c'd.  3/12 episode of hypoxemia and hypercapnia/respiratory acidemia, DCCV for a-flutter with RVR and hypotension.

## 2018-03-13 NOTE — BRIEF OPERATIVE NOTE - POST-OP DX
Acute aspiration pneumonia  03/13/2018    Active  Pedro Luis Hu  Esophageal cancer  02/26/2018    Active  Rickey Vera
Esophageal cancer  02/26/2018    Active  Rickey Vera
Esophageal cancer  02/26/2018    Active  Rickey Vera

## 2018-03-13 NOTE — PROCEDURE NOTE - PROCEDURE DATE TIME, MLM
08-Mar-2018 08:00
08-Mar-2018 09:05
09-Mar-2018 18:08
13-Mar-2018 15:11
08-Mar-2018 15:14
08-Mar-2018 15:17
12-Mar-2018 20:20
13-Mar-2018 19:30

## 2018-03-13 NOTE — PROGRESS NOTE ADULT - ASSESSMENT
56 y/o M with esophageal Ca s/p robotic [place] Mehran Esophagogastrectomy and jejunostomy tube placement 2/6/18, developed fever post op 3/4/18, sputum culture with Serratia marcescens and Pseudomonas aeruginosa  WBC elevation, INTERMITTENT FEVER

## 2018-03-13 NOTE — PROGRESS NOTE ADULT - PROBLEM SELECTOR PROBLEM 1
Malignant neoplasm of esophagus, unspecified location
Post-operative pain
Post-operative pain
ARDS (adult respiratory distress syndrome)
Cardiac arrest
Malignant neoplasm of esophagus, unspecified location
Pneumonia, bacterial
Post-operative pain
Respiratory arrest
ARDS (adult respiratory distress syndrome)
Cardiac arrest
Malignant neoplasm of esophagus, unspecified location
Cardiac arrest
ARDS (adult respiratory distress syndrome)
Respiratory arrest
Malignant neoplasm of esophagus, unspecified

## 2018-03-13 NOTE — PROGRESS NOTE ADULT - SUBJECTIVE AND OBJECTIVE BOX
INTERVAL HPI/OVERNIGHT EVENTS/SUBJECTIVE:  Pt seen and examined at bedside. Intubated and sedated, yesterday was hypoxemic and hypercapnic, requiring up titration of FiO2 and adjustment of vent settings. Was cardioverted yesterday for rapid a flutter. Tube feeds at 20cc/hr. Had BM this AM.     ICU Vital Signs Last 24 Hrs  T(C): 37.4 (13 Mar 2018 10:00), Max: 38.2 (12 Mar 2018 14:00)  T(F): 99.3 (13 Mar 2018 10:00), Max: 100.8 (12 Mar 2018 14:00)  HR: 87 (13 Mar 2018 10:00) (80 - 145)  BP: 116/56 (13 Mar 2018 08:10) (116/56 - 116/56)  BP(mean): 80 (13 Mar 2018 08:10) (80 - 80)  ABP: 111/43 (13 Mar 2018 10:00) (74/41 - 144/55)  ABP(mean): 57 (13 Mar 2018 10:00) (50 - 75)  RR: 34 (13 Mar 2018 10:00) (12 - 37)  SpO2: 98% (13 Mar 2018 10:00) (89% - 100%)      I&O's Detail    12 Mar 2018 07:01  -  13 Mar 2018 07:00  --------------------------------------------------------  IN:    amiodarone Infusion: 199.8 mL    amiodarone Infusion: 66.8 mL    diltiazem Infusion: 90 mL    Enteral Tube Flush: 80 mL    heparin  Infusion.: 91 mL    phenylephrine   Infusion: 30 mL    propofol Infusion: 225.4 mL    Sodium Chloride 0.9% IV Bolus: 250 mL    Solution: 50 mL    Solution: 50 mL    Vital HN: 380 mL  Total IN: 1513 mL    OUT:    Bulb: 50 mL    Indwelling Catheter - Urethral: 1805 mL  Total OUT: 1855 mL    Total NET: -342 mL      13 Mar 2018 07:01  -  13 Mar 2018 10:39  --------------------------------------------------------  IN:    amiodarone Infusion: 66.8 mL    Enteral Tube Flush: 20 mL    propofol Infusion: 64.8 mL    Vital HN: 80 mL  Total IN: 231.6 mL    OUT:    Bulb: 5 mL    Indwelling Catheter - Urethral: 165 mL    Stool: 1 mL  Total OUT: 171 mL    Total NET: 60.6 mL          Mode: AC/ CMV (Assist Control/ Continuous Mandatory Ventilation)  RR (machine): 34  TV (machine): 550  FiO2: 70  PEEP: 8  MAP: 14  PIP: 28    ABG - ( 13 Mar 2018 08:11 )  pH: 7.35  /  pCO2: 65    /  pO2: 67    / HCO3: 32    / Base Excess: 8.4   /  SaO2: 92                  MEDICATIONS  (STANDING):  amiodarone Infusion 1 mG/Min (33.333 mL/Hr) IV Continuous <Continuous>  amiodarone Infusion 0.5 mG/Min (16.667 mL/Hr) IV Continuous <Continuous>  cefepime  IVPB 2000 milliGRAM(s) IV Intermittent every 12 hours  chlorhexidine 0.12% Liquid 15 milliLiter(s) Swish and Spit two times a day  enoxaparin Injectable 40 milliGRAM(s) SubCutaneous daily  levalbuterol Inhalation 1.25 milliGRAM(s) Inhalation every 6 hours  pantoprazole  Injectable 40 milliGRAM(s) IV Push daily  phenylephrine    Infusion 0.1 MICROgram(s)/kG/Min (2.025 mL/Hr) IV Continuous <Continuous>  propofol Infusion 10 MICROgram(s)/kG/Min (3.24 mL/Hr) IV Continuous <Continuous>    MEDICATIONS  (PRN):  artificial  tears Solution 1 Drop(s) Both EYES three times a day PRN Dry Eyes  bisacodyl Suppository 10 milliGRAM(s) Rectal daily PRN Constipation  fentaNYL    Injectable 50 MICROGram(s) IV Push every 3 hours PRN sedation off paralytic    MISC:     PHYSICAL EXAM:    Constitutional: thin, ill appearing  HEENT: + ETT  Respiratory: intubated, no accessory muscle use   Cardiovascular: NSR   Chest: + LSC TLC site C/D/I, right CT to bulb suction, thoracotomy C/D/I without heat or redness  Abdomen/GI: Softly distended, +tympany, + J-tube. port holes x 5 C/D/I with steris, no heat or redness  : castro in situ to gravity drainage  Extremities: no peripheral edema except distal RUE/right hand, no cyanosis, no clubbing.   Neurological: sedated  Skin: warm, dry, well perfused, no rashes      LABS:  CBC Full  -  ( 13 Mar 2018 03:13 )  WBC Count : 12.5 K/uL  Hemoglobin : 8.6 g/dL  Hematocrit : 27.0 %  Platelet Count - Automated : 388 K/uL  Mean Cell Volume : 90.3 fl  Mean Cell Hemoglobin : 28.8 pg  Mean Cell Hemoglobin Concentration : 31.9 g/dL  Auto Neutrophil # : x  Auto Lymphocyte # : x  Auto Monocyte # : x  Auto Eosinophil # : x  Auto Basophil # : x  Auto Neutrophil % : x  Auto Lymphocyte % : x  Auto Monocyte % : x  Auto Eosinophil % : x  Auto Basophil % : x    03-13    144  |  99  |  55.0<H>  ----------------------------<  130<H>  4.4   |  33.0<H>  |  1.78<H>    Ca    8.5<L>      13 Mar 2018 03:13  Mg     2.8     03-13      PTT - ( 12 Mar 2018 17:17 )  PTT:32.2 sec    RECENT CULTURES:  03-09 .Blood Blood XXXX XXXX   No growth at 48 hours    03-08 .Blood Blood XXXX XXXX   No growth at 48 hours    03-08 .Blood Blood XXXX XXXX   No growth at 48 hours      ASSESSMENT/PLAN:  Pt is a 57 y.o male with esophageal cancer POD 15 [place] marlon esophagogastrectomy. Complicated ICU course.   -cont care per CICU  -tube feeds 20 cc/hr  -optimize respiration status  -will discuss with attending

## 2018-03-13 NOTE — PROGRESS NOTE ADULT - SUBJECTIVE AND OBJECTIVE BOX
[facility] DIVISION OF KIDNEY DISEASES AND HYPERTENSION -- FOLLOW UP NOTE  --------------------------------------------------------------------------------  Chief Complaint: RENETTA ; acidosis    24 hour events/subjective:  Pt seen and examined in CICU  Overnight was cardioverted  Slight uptick in serum creatinine this AM  Still remains intubated on vent; Fi02 100%      PAST HISTORY  --------------------------------------------------------------------------------  No significant changes to PMH, PSH, FHx, SHx, unless otherwise noted    ALLERGIES & MEDICATIONS  --------------------------------------------------------------------------------  Allergies    No Known Allergies    Intolerances      Standing Inpatient Medications  amiodarone Infusion 1 mG/Min IV Continuous <Continuous>  amiodarone Infusion 0.5 mG/Min IV Continuous <Continuous>  cefepime  IVPB 2000 milliGRAM(s) IV Intermittent every 12 hours  chlorhexidine 0.12% Liquid 15 milliLiter(s) Swish and Spit two times a day  enoxaparin Injectable 40 milliGRAM(s) SubCutaneous daily  heparin  Infusion. 1200 Unit(s)/Hr IV Continuous <Continuous>  levalbuterol Inhalation 1.25 milliGRAM(s) Inhalation every 6 hours  pantoprazole  Injectable 40 milliGRAM(s) IV Push daily  phenylephrine    Infusion 0.1 MICROgram(s)/kG/Min IV Continuous <Continuous>  propofol Infusion 10 MICROgram(s)/kG/Min IV Continuous <Continuous>    PRN Inpatient Medications  artificial  tears Solution 1 Drop(s) Both EYES three times a day PRN  bisacodyl Suppository 10 milliGRAM(s) Rectal daily PRN  fentaNYL    Injectable 50 MICROGram(s) IV Push every 3 hours PRN      REVIEW OF SYSTEMS  --------------------------------------------------------------------------------  Unable to obtain    VITALS/PHYSICAL EXAM  --------------------------------------------------------------------------------  T(C): 37 (03-13-18 @ 12:00), Max: 38.2 (03-12-18 @ 14:00)  HR: 88 (03-13-18 @ 12:30) (80 - 145)  BP: 86/49 (03-13-18 @ 12:10) (86/49 - 116/56)  RR: 37 (03-13-18 @ 12:30) (12 - 37)  SpO2: 94% (03-13-18 @ 12:30) (89% - 100%)  Wt(kg): --        03-12-18 @ 07:01  -  03-13-18 @ 07:00  --------------------------------------------------------  IN: 1513 mL / OUT: 1855 mL / NET: -342 mL    03-13-18 @ 07:01  -  03-13-18 @ 12:51  --------------------------------------------------------  IN: 346.2 mL / OUT: 246 mL / NET: 100.2 mL      Physical Exam:  	Gen: int/sed  	HEENT: ETT+  	Pulm: vent BS  	CV: RRR, S1S2; no rub  	Back: No spinal or CVA tenderness; no sacral edema  	Abd: +BS, soft, nontender/nondistended  	: castro+  	LE: Warm, FROM, no clubbing, intact strength; no edema  	Skin: Warm, without rashes    LABS/STUDIES  --------------------------------------------------------------------------------              8.6    12.5  >-----------<  388      [03-13-18 @ 03:13]              27.0     144  |  99  |  55.0  ----------------------------<  130      [03-13-18 @ 03:13]  4.4   |  33.0  |  1.78        Ca     8.5     [03-13-18 @ 03:13]      Mg     2.8     [03-13-18 @ 03:13]        PTT: 32.2       [03-12-18 @ 17:17]      Creatinine Trend:  SCr 1.78 [03-13 @ 03:13]  SCr 1.49 [03-12 @ 03:23]  SCr 1.76 [03-11 @ 04:34]  SCr 1.61 [03-10 @ 12:47]  SCr 1.53 [03-10 @ 03:41]        HbA1c 5.5      [02-12-18 @ 10:54]  Lipid: chol --, , HDL --, LDL --      [03-10-18 @ 12:47]

## 2018-03-13 NOTE — PROGRESS NOTE ADULT - ASSESSMENT
BRIEF HOSPITAL COURSE: 56 yo admitted electively for [place] Mehran esophagectomy for esophageal cancer on 2/26; had an aspiration event this AM and developed PEA arrest with difficulty oxygenating, ventilating, and shock.  Medical critical care service was consulted for severe ARDS management. Now in multi-system organ failure. Poor prognosis

## 2018-03-13 NOTE — PROGRESS NOTE ADULT - PROBLEM SELECTOR PLAN 7
Protonix daily
s/p robotic [place] Mehran Esophagogastrectomy and jejunostomy tube placement 2/6/18  s/p Chemo-radiation
s/p robotic [place] Mehran Esophagogastrectomy and jejunostomy tube placement 2/6/18  s/p Chemo-radiation
Protonix daily
Protonix daily

## 2018-03-13 NOTE — PROGRESS NOTE ADULT - ASSESSMENT
1) ATN  2) Oligo-anuria  3) Respiratory Acidosis  4) Hypotension    Patient is s/p PEA arrest with 4 minute CPR  Has ATN from poor perfusion; hypotensive; overnight the SCr bumped to 1.78; likely from hypoperfusion/cardioverted  Respiratory acidosis; chronically compensating ; follow daily ABGs  Check urine electrolytes; change all drips to NS instead of D5  Follow I/O  Net negative  d/w CTICU

## 2018-03-13 NOTE — PROCEDURE NOTE - NSINFORMCONSENT_GEN_A_CORE
This was an emergent procedure.
-Improved today, controlled with tessalon and guaifenesin. RVP negative. Satting well on room air.
This was an emergent procedure.
This was an emergent procedure.
code blue/This was an emergent procedure.
Benefits, risks, and possible complications of procedure explained to patient/caregiver who verbalized understanding and gave verbal consent.
Benefits, risks, and possible complications of procedure explained to patient/caregiver who verbalized understanding and gave verbal consent.
Benefits, risks, and possible complications of procedure explained to patient/caregiver who verbalized understanding and gave written consent.
a-flutter 130s, hypotensive/This was an emergent procedure.

## 2018-03-13 NOTE — PROGRESS NOTE ADULT - PROBLEM SELECTOR PLAN 2
converted to NSR after DCCV x 1 at 200J  amiodarone initiated as discussed with Dr Dockery (EP) to assist with maintaining NSR  no further heparin at this time per Dr Blanco (okay for DVT PPX dose of lovenox)  keep potassium >4 and magnesium > 2

## 2018-03-13 NOTE — PROCEDURE NOTE - NSCVLACTUALSITE_VASC_A_CORE
left/subclavian vein
femoral vein/right
internal jugular/right
placement in inferior vena cava/right
left

## 2018-03-13 NOTE — PROGRESS NOTE ADULT - PROBLEM SELECTOR PROBLEM 5
Malignant neoplasm of esophagus, unspecified
Esophageal cancer
RENETTA (acute kidney injury)
Esophageal cancer
Immunosuppressed due to chemotherapy
Malignant neoplasm of esophagus, unspecified
Malignant neoplasm of esophagus, unspecified location
Pneumonia, bacterial
Pneumonia, bacterial
Pseudomonas pneumonia
Pseudomonas pneumonia
Esophageal cancer
Malignant neoplasm of esophagus, unspecified location
Pneumonia, bacterial
Malignant neoplasm of esophagus, unspecified location

## 2018-03-13 NOTE — PROGRESS NOTE ADULT - PROBLEM SELECTOR PLAN 10
Hourly ABGs, Daily CXR  Continue Nitric  Repeat Bronchoscopy consistent with findings of clear airways, ETT exchanges, follow up ABG mildly improved  Plan to monitor for improvement, otherwise may need rota prone bed vs VV ECMO
improving  MICU attendings recommendations appreciated  trend ABG, PIP and plateau pressures  will discuss solumedrol duration with Dr Barron during am rounds
Hourly ABGs, Daily CXR  Continue Nitric  Repeat Bronchoscopy consistent with findings of clear airways, ETT exchanges, follow up ABG mildly improved  Plan to monitor for improvement, otherwise may need rota prone bed vs VV ECMO
floating non-occlusive thrombus in right axillary vein  will d/w team on am round re: need for anticoagulation and risk for bleeding
floating non-occlusive thrombus in right axillary vein  will d/w team on am round re: need for anticoagulation and risk for bleeding

## 2018-03-13 NOTE — PROCEDURE NOTE - NSINDICATIONS_GEN_A_CORE
critical patient/monitoring purposes
hemodynamic monitoring/critical illness/emergency venous access/venous access/volume resuscitation
atrial flutter
critical illness
critical illness/emergency venous access/venous access/hemodynamic monitoring/volume resuscitation
critical illness/hemodynamic monitoring/volume resuscitation/venous access
dialysis/CRRT/critical illness
arterial puncture to obtain ABG's/monitoring purposes/cannulation purposes/critical patient

## 2018-03-13 NOTE — PROGRESS NOTE ADULT - PROBLEM SELECTOR PROBLEM 4
Pseudomonas pneumonia
RENETTA (acute kidney injury)
Pseudomonas pneumonia
Constipation
Constipation
Malignant neoplasm of esophagus, unspecified
Pneumonia, bacterial
Pseudomonas pneumonia
RENETTA (acute kidney injury)
Serratia marcescens infection
RENETTA (acute kidney injury)
Serratia marcescens infection
Constipation
Serratia marcescens infection

## 2018-03-13 NOTE — PROCEDURE NOTE - NSPOSTCAREGUIDE_GEN_A_CORE
Care for catheter as per unit/ICU protocols

## 2018-03-13 NOTE — PROGRESS NOTE ADULT - PROBLEM SELECTOR PLAN 9
cefepime continues  vanco d/c'd
improving  trend ABG, PIP and plateau pressures  solumedrol d/c'd 3/12 am  consider transitioning to precedex from propofol for weaning trials when indicated
improving  trend ABG, PIP and plateau pressures  solumedrol d/c'd 3/12 am  may benefit from transition to precedex from propofol for weaning trials
s/p PEA arrest secondary to PEA arrest  Seen by ID,  Vancomycin added
s/p PEA arrest secondary to PEA arrest  Seen by ID,  Vancomycin added

## 2018-03-13 NOTE — PROGRESS NOTE ADULT - PROBLEM SELECTOR PROBLEM 9
Aspiration pneumonia of right middle lobe due to gastric secretions
Acute respiratory failure with hypoxia and hypercapnia
Acute respiratory failure with hypoxia and hypercapnia
Aspiration pneumonia of right middle lobe due to gastric secretions
Aspiration pneumonia of right middle lobe due to gastric secretions

## 2018-03-13 NOTE — CHART NOTE - NSCHARTNOTEFT_GEN_A_CORE
Was called by CICU PA to evaluate patient for acute decompensation.   Pt was seen and examined by Dr. Olson and myself.  Pt is s/p bedside bronchoscopy, found hole in left mainstem increased in size.   Pt is now on pressors, ABG worsening, lactate 3.0, cr 2.68, anuric, procalcitonin 130.   Abd softly distended, + tympany  Resuscitation as needed to maintain maps.   serial abd exams   Continue care as per CICU

## 2018-03-13 NOTE — PROGRESS NOTE ADULT - PROBLEM SELECTOR PROBLEM 2
Esophageal cancer
Jejunostomy tube in situ
ARDS (adult respiratory distress syndrome)
Atrial fibrillation with rapid ventricular response
Atrial fibrillation with rapid ventricular response
Cardiogenic shock
Jejunostomy tube in situ
Pneumonia, bacterial
Serratia marcescens infection
ARDS (adult respiratory distress syndrome)
Cardiogenic shock
Jejunostomy tube in situ
ARDS (adult respiratory distress syndrome)
Cardiogenic shock
Pneumonia, bacterial

## 2018-03-13 NOTE — PROGRESS NOTE ADULT - SUBJECTIVE AND OBJECTIVE BOX
Patient is a 57y old  Male who presents with a chief complaint of Esophageal Cancer (12 Feb 2018 10:28)      BRIEF HOSPITAL COURSE: 58 yo admitted electively for [facility] esophagectomy for esophageal cancer on 2/26; had an aspiration event this AM and developed PEA arrest with difficulty oxygenating, ventilating, and shock.  Medical critical care service was consulted for severe ARDS management. Clinically improved over weekend now with MSOR, severe ARDS unable to ventilate, tracheal perforation enlarged, multi-pressor shock, asked to help with ventilator management    PAST MEDICAL & SURGICAL HISTORY:  Esophageal cancer  Weight loss, unintentional  Hodgkins lymphoma: 25 years ago  Acid reflux disease  No significant past surgical history      Review of Systems:  unable to obatin    Medications:  azithromycin  IVPB      cefepime  IVPB 2000 milliGRAM(s) IV Intermittent every 12 hours  micafungin IVPB        amiodarone Infusion 1 mG/Min IV Continuous <Continuous>  amiodarone Infusion 0.5 mG/Min IV Continuous <Continuous>  norepinephrine Infusion 0.049 MICROgram(s)/kG/Min IV Continuous <Continuous>  phenylephrine    Infusion 0.1 MICROgram(s)/kG/Min IV Continuous <Continuous>      cisatracurium Infusion 1 MICROgram(s)/kG/Min IV Continuous <Continuous>  fentaNYL    Injectable 50 MICROGram(s) IV Push every 3 hours PRN  propofol Infusion 10 MICROgram(s)/kG/Min IV Continuous <Continuous>      enoxaparin Injectable 40 milliGRAM(s) SubCutaneous daily  heparin  Infusion. 1200 Unit(s)/Hr IV Continuous <Continuous>    bisacodyl Suppository 10 milliGRAM(s) Rectal daily PRN  pantoprazole  Injectable 40 milliGRAM(s) IV Push daily      hydrocortisone sodium succinate Injectable 50 milliGRAM(s) IV Push every 6 hours  vasopressin Infusion 0.06 Unit(s)/Min IV Continuous <Continuous>    ascorbic acid IVPB 1500 milliGRAM(s) IV Intermittent every 6 hours  lactated ringers. 1000 milliLiter(s) IV Continuous <Continuous>  thiamine Injectable 100 milliGRAM(s) IV Push <User Schedule>      artificial  tears Solution 1 Drop(s) Both EYES three times a day PRN  chlorhexidine 0.12% Liquid 15 milliLiter(s) Swish and Spit two times a day        Mode: AC/ CMV (Assist Control/ Continuous Mandatory Ventilation)  RR (machine): 34  TV (machine): 550  FiO2: 100  PEEP: 8  ITime: 0.7  MAP: 18  PC: 25  PIP: 33      ICU Vital Signs Last 24 Hrs  T(C): 37.4 (13 Mar 2018 19:00), Max: 38.8 (13 Mar 2018 14:45)  T(F): 99.3 (13 Mar 2018 19:00), Max: 101.8 (13 Mar 2018 14:45)  HR: 81 (13 Mar 2018 19:30) (79 - 92)  BP: 103/52 (13 Mar 2018 17:45) (80/44 - 118/58)  BP(mean): 73 (13 Mar 2018 17:45) (57 - 81)  ABP: 118/42 (13 Mar 2018 19:30) (67/41 - 140/55)  ABP(mean): 60 (13 Mar 2018 19:30) (35 - 84)  RR: 34 (13 Mar 2018 19:30) (12 - 37)  SpO2: 95% (13 Mar 2018 19:30) (87% - 100%)      ABG - ( 13 Mar 2018 18:51 )  pH: 7.13  /  pCO2: 98    /  pO2: 85    / HCO3: 26    / Base Excess: 2.0   /  SaO2: 94                  I&O's Detail    12 Mar 2018 07:01  -  13 Mar 2018 07:00  --------------------------------------------------------  IN:    amiodarone Infusion: 199.8 mL    amiodarone Infusion: 66.8 mL    diltiazem Infusion: 90 mL    Enteral Tube Flush: 80 mL    heparin  Infusion.: 91 mL    phenylephrine   Infusion: 30 mL    propofol Infusion: 225.4 mL    Sodium Chloride 0.9% IV Bolus: 250 mL    Solution: 50 mL    Solution: 50 mL    Vital HN: 380 mL  Total IN: 1513 mL    OUT:    Bulb: 50 mL    Indwelling Catheter - Urethral: 1805 mL  Total OUT: 1855 mL    Total NET: -342 mL      13 Mar 2018 07:01  -  13 Mar 2018 20:06  --------------------------------------------------------  IN:    amiodarone Infusion: 200.4 mL    cisatracurium Infusion: 12.9 mL    Enteral Tube Flush: 40 mL    heparin  Infusion.: 86 mL    lactated ringers.: 375 mL    norepinephrine Infusion: 88 mL    phenylephrine   Infusion: 364 mL    propofol Infusion: 172 mL    Solution: 100 mL    Solution: 50 mL    Solution: 250 mL    vasopressin Infusion: 10.8 mL    Vital HN: 140 mL  Total IN: 1889.1 mL    OUT:    Bulb: 20 mL    Indwelling Catheter - Urethral: 250 mL    Other: 10 mL    Stool: 1 mL  Total OUT: 281 mL    Total NET: 1608.1 mL            LABS:                        8.0    6.2   )-----------( 312      ( 13 Mar 2018 15:24 )             25.1     03-13    144  |  98  |  66.0<H>  ----------------------------<  142<H>  4.2   |  33.0<H>  |  2.68<H>    Ca    8.4<L>      13 Mar 2018 15:24  Mg     2.9     03-13            CAPILLARY BLOOD GLUCOSE        PTT - ( 13 Mar 2018 17:48 )  PTT:33.4 sec    CULTURES:  Culture Results:   No growth at 48 hours (03-09-18 @ 14:45)  Culture Results:   No growth at 48 hours (03-08-18 @ 16:14)  Culture Results:   No growth at 48 hours (03-08-18 @ 16:13)      Physical Examination:    General: paralyzed and sedated, malnourished    HEENT: Pupils equal, reactive to light.  Symmetric.    PULM: course and diminished, foul smelling secretions    CVS: Regular rate and rhythm, no murmurs, rubs, or gallops    ABD:+ distension    EXT: No edema, nontender    SKIN: Warm and well perfused, no rashes noted.    RADIOLOGY: < from: Xray Chest 1 View- PORTABLE-Routine (03.13.18 @ 05:07) >  EXAM:  XR CHEST PORTABLE ROUTINE 1V                          PROCEDURE DATE:  03/13/2018          INTERPRETATION:  Chest, single portable view    HISTORY: cticu s/p cardiac arrest    Comparison: 3/12    IMPRESSION:    Support Devices:  Unchanged  Heart:  Unremarkable  Mediastinum:  Unremarkable  Lungs/Airways: Bilateral airspace opacities and small right effusion   stable  Bones/Soft tissues: Unremarkable    CRITICAL CARE TIME SPENT: 45 min

## 2018-03-13 NOTE — PROGRESS NOTE ADULT - PROBLEM SELECTOR PROBLEM 7
Immunosuppressed due to chemotherapy
Gastroesophageal reflux disease without esophagitis
Malignant neoplasm of esophagus, unspecified
Malignant neoplasm of esophagus, unspecified
Gastroesophageal reflux disease without esophagitis
Gastroesophageal reflux disease without esophagitis

## 2018-03-13 NOTE — PROCEDURE NOTE - NSPOSTPRCRAD_GEN_A_CORE
post-procedure radiography performed/no pneumothorax/left subclavian vein/central line located in the
n/a
right SVC/central line located in the superior vena cava
central line located in the superior vena cava/central line located in the/depth of insertion/line adjusted to depth of insertion
central line located in the/depth of insertion/post procedure radiography not performed/no pneumothorax

## 2018-03-13 NOTE — PROGRESS NOTE ADULT - PROBLEM SELECTOR PROBLEM 10
Acute respiratory failure with hypoxia and hypercapnia
Acute respiratory failure with hypoxia and hypercapnia
Thrombus
Thrombus
Acute respiratory failure with hypoxia and hypercapnia

## 2018-03-13 NOTE — PROGRESS NOTE ADULT - PROBLEM SELECTOR PLAN 8
s/p PEA arrest 3/8/18  ROSC after CPR/Epinephrine  Secondary to aspiration  daily sedation vacation to evaluate neuro status
s/p PEA arrest 3/8/18  ROSC after CPR/Epinephrine  Secondary to aspiration
s/p PEA arrest 3/8/18 likely d/t hypoxemia secondary to ASPN event  ROSC after CPR/Epinephrine  Secondary to aspiration  daily sedation vacation to evaluate neuro status
s/p PEA arrest 3/8/18 likely d/t hypoxemia secondary to ASPN event  ROSC after CPR/Epinephrine  Secondary to aspiration  daily sedation vacation to evaluate neuro status
s/p PEA arrest 3/8/18  ROSC after CPR/Epinephrine  Secondary to aspiration

## 2018-03-13 NOTE — PROGRESS NOTE ADULT - PROBLEM SELECTOR PLAN 1
Serratia marcescens and Pseudomonas aeruginosa in sputum cx SS to cefepime  Continue Cefepime for now; repeat SPUTUM Culture  TREND WBC   ADD AZITHROMYCIN DUE TO INTERMITTENT FEVERS

## 2018-03-13 NOTE — PROGRESS NOTE ADULT - SUBJECTIVE AND OBJECTIVE BOX
Patient seen today in bed. Overnight events noted. Patient was cardioverted to SR overnight and started on Amiodarone drip. Remains intubated a sedated.     TELE: SR in the 80s.     MEDICATIONS  (STANDING):  amiodarone Infusion 1 mG/Min (33.333 mL/Hr) IV Continuous <Continuous>  amiodarone Infusion 0.5 mG/Min (16.667 mL/Hr) IV Continuous <Continuous>  cefepime  IVPB 2000 milliGRAM(s) IV Intermittent every 12 hours  chlorhexidine 0.12% Liquid 15 milliLiter(s) Swish and Spit two times a day  enoxaparin Injectable 40 milliGRAM(s) SubCutaneous daily  levalbuterol Inhalation 1.25 milliGRAM(s) Inhalation every 6 hours  pantoprazole  Injectable 40 milliGRAM(s) IV Push daily  phenylephrine    Infusion 0.1 MICROgram(s)/kG/Min (2.025 mL/Hr) IV Continuous <Continuous>  propofol Infusion 10 MICROgram(s)/kG/Min (3.24 mL/Hr) IV Continuous <Continuous>    MEDICATIONS  (PRN):  artificial  tears Solution 1 Drop(s) Both EYES three times a day PRN Dry Eyes  bisacodyl Suppository 10 milliGRAM(s) Rectal daily PRN Constipation  fentaNYL    Injectable 50 MICROGram(s) IV Push every 3 hours PRN sedation off paralytic    Allergies  No Known Allergies    Intolerances    PAST MEDICAL & SURGICAL HISTORY:  Esophageal cancer  Weight loss, unintentional  Hodgkins lymphoma: 25 years ago  Acid reflux disease  No significant past surgical history    Vital Signs Last 24 Hrs  T(C): 37.7 (13 Mar 2018 11:00), Max: 38.2 (12 Mar 2018 14:00)  T(F): 99.9 (13 Mar 2018 11:00), Max: 100.8 (12 Mar 2018 14:00)  HR: 89 (13 Mar 2018 11:00) (80 - 145)  BP: 116/56 (13 Mar 2018 08:10) (116/56 - 116/56)  BP(mean): 80 (13 Mar 2018 08:10) (80 - 80)  RR: 34 (13 Mar 2018 11:00) (12 - 37)  SpO2: 99% (13 Mar 2018 11:00) (89% - 100%)    Physical Exam:  Constitutional: Sedated AAOx0  Cardiovascular: +S1S2 RRR  Pulmonary: Coarse breath sounds. Rhonchi globally, mechanically ventilated  GI: soft NTND +BS  Extremities: no pedal edema,    LABS:                        8.6    12.5  )-----------( 388      ( 13 Mar 2018 03:13 )             27.0     03-13    144  |  99  |  55.0<H>  ----------------------------<  130<H>  4.4   |  33.0<H>  |  1.78<H>    Ca    8.5<L>      13 Mar 2018 03:13  Mg     2.8     03-13  PTT - ( 12 Mar 2018 17:17 )  PTT:32.2 sec    A/P  57 year old Male with PMH  Hodgkin's lymphoma, GERD, esophageal cancer s/p robotic [facility] Esophagogastrectomy and jejunostomy tube placement 2/26/18 complicated by serratia and pseudomonas PNA and aspiration event resulting in PEA arrest with CPR x 4 minutes before ROSC, and ARDS. Now back in SR.     - Patient in SR  - No QT prolongation on EKG. Measured manually: 400ms.   - continue Amiodarone drip, will transition to PO perhaps tomorrow  - would fully anticoagulate the patient as per guidelines for a least 30 days post cardioversion. In terms of oral anticoagulation would use Coumadin if no objections from surgery team. May be preferable in terms of bleeding and from reversible standpoint.   - Will follow. Plan discussed with Dr. Dockery.

## 2018-03-13 NOTE — PROGRESS NOTE ADULT - PROBLEM SELECTOR PROBLEM 3
Prophylactic measure
Generalized abdominal pain
Generalized abdominal pain
Prophylactic measure
Pseudomonas pneumonia
RENETTA (acute kidney injury)
Respiratory arrest
Respiratory arrest
Serratia marcescens infection
Serratia marcescens infection
Shock
RENETTA (acute kidney injury)
Shock
Generalized abdominal pain
Shock
RENETTA (acute kidney injury)
Serratia marcescens infection

## 2018-03-13 NOTE — BRIEF OPERATIVE NOTE - PRE-OP DX
Acute aspiration pneumonia  03/13/2018  Right side  Active  Pedro Luis Hu  Esophageal lesion  02/26/2018  distal esophageal lesion  Active  Rickey Vera
Esophageal lesion  02/26/2018  distal esophageal lesion  Active  Rickey Vera
Esophageal lesion  02/26/2018  distal esophageal lesion  Active  Rickey Vera

## 2018-03-13 NOTE — PROGRESS NOTE ADULT - SUBJECTIVE AND OBJECTIVE BOX
[facility]  INFECTIOUS DISEASES AND INTERNAL MEDICINE at [place]  =======================================================  Jere Spear MD  Diplomates American Board of Internal Medicine and Infectious Diseases  =======================================================    OZZIE KAISERO [id number]    Follow up:  Pneumonia with serratia, pseudomonas  Had Respiratory arrest 3/8/17was intubated placed on vent, s/p bedside bronch with removal of about 500cc of tube feeds   still with WBC elevation. INTERMITTENT FEVER 100.8 ON 3/12/18  COPIOUS SPUTUM WHEN LYING SUPINE PER RN.     Allergies:  No Known Allergies      MEDICATIONS  (STANDING):  amiodarone Infusion 1 mG/Min (33.333 mL/Hr) IV Continuous <Continuous>  amiodarone Infusion 0.5 mG/Min (16.667 mL/Hr) IV Continuous <Continuous>  azithromycin  IVPB      cefepime  IVPB 2000 milliGRAM(s) IV Intermittent every 12 hours  chlorhexidine 0.12% Liquid 15 milliLiter(s) Swish and Spit two times a day  enoxaparin Injectable 40 milliGRAM(s) SubCutaneous daily  fentaNYL    Injectable 50 MICROGram(s) IV Push once  heparin  Infusion. 1200 Unit(s)/Hr (12 mL/Hr) IV Continuous <Continuous>  levalbuterol Inhalation 1.25 milliGRAM(s) Inhalation every 6 hours  pantoprazole  Injectable 40 milliGRAM(s) IV Push daily  phenylephrine    Infusion 0.247 MICROgram(s)/kG/Min (5 mL/Hr) IV Continuous <Continuous>  phenylephrine    Infusion 0.1 MICROgram(s)/kG/Min (2.025 mL/Hr) IV Continuous <Continuous>  propofol Infusion 10 MICROgram(s)/kG/Min (3.24 mL/Hr) IV Continuous <Continuous>  vecuronium Injectable 10 milliGRAM(s) IV Push once       REVIEW OF SYSTEMS:  Unable to obtain, patient is intubated on vent      Physical Exam:  Vital Signs Last 24 Hrs  T(C): 37 (13 Mar 2018 12:00), Max: 38.2 (12 Mar 2018 14:00)  T(F): 98.6 (13 Mar 2018 12:00), Max: 100.8 (12 Mar 2018 14:00)  HR: 89 (13 Mar 2018 12:45) (80 - 145)  BP: 86/49 (13 Mar 2018 12:10) (86/49 - 116/56)  BP(mean): 63 (13 Mar 2018 12:10) (63 - 80)  RR: 35 (13 Mar 2018 12:45) (12 - 37)  SpO2: 94% (13 Mar 2018 12:45) (89% - 100%)    GEN: sedated on vent  HEENT: normocephalic and atraumatic.  +NGT + ETT  NECK: Supple.  LUNGS: coarse BS B/L  HEART: Regular rate and rhythm  ABDOMEN: Soft, nontender, and nondistended.  Positive bowel sounds.    : + Garcia  EXTREMITIES: Without any edema.  MSK: No joint swelling  NEUROLOGIC: Sedated  PSYCHIATRIC: Unable to obtain, patient sedated   SKIN: No rash    ===========================        Labs:  03-13    144  |  99  |  55.0<H>  ----------------------------<  130<H>  4.4   |  33.0<H>  |  1.78<H>    Ca    8.5<L>      13 Mar 2018 03:13  Mg     2.8     03-13                            8.6    12.5  )-----------( 388      ( 13 Mar 2018 03:13 )             27.0       PTT - ( 12 Mar 2018 17:17 )  PTT:32.2 sec          CAPILLARY BLOOD GLUCOSE        ABG - ( 13 Mar 2018 12:40 )  pH: 7.21  /  pCO2: 87    /  pO2: 78    / HCO3: 29    / Base Excess: 4.9   /  SaO2: 92          RECENT CULTURES:  03-09 @ 14:45 .Blood Blood     No growth at 48 hours    03-08 @ 16:14 .Blood Blood     No growth at 48 hours    03-08 @ 16:13 .Blood Blood     No growth at 48 hours    03-04 @ 05:44 .Urine Clean Catch (Midstream)     No growth    03-04 @ 02:47 .Sputum Sputum Serratia marcescens  Pseudomonas aeruginosa    Numerous Serratia marcescens  Numerous Pseudomonas aeruginosa  Numerous Routine respiratory marquis present  Few White blood cells  Numerous Gram Negative Rods  Moderate Gram Positive Cocci in Pairs and Chains  Culture - Sputum . (03.04.18 @ 02:47)    Gram Stain:   Few White blood cells  Numerous Gram Negative Rods  Moderate Gram Positive Cocci in Pairs and Chains    -  Amikacin: S <=16    -  Amikacin: S <=16    -  Ampicillin: R >16    -  Ampicillin/Sulbactam: R >16/8    -  Aztreonam: S <=4    -  Aztreonam: S <=4    -  Cefazolin: R >16    -  Cefepime: S <=4    -  Cefepime: S <=4    -  Cefoxitin: R 16    -  Ceftazidime: S <=1    -  Ceftazidime: S 4    -  Ceftriaxone: S <=1    -  Ciprofloxacin: S <=1    -  Ciprofloxacin: S <=1    -  Ertapenem: S <=1    -  Gentamicin: S <=4    -  Gentamicin: S <=4    -  Imipenem: S <=1    -  Imipenem: S <=1    -  Levofloxacin: S <=2    -  Levofloxacin: I 4    -  Meropenem: S <=1    -  Meropenem: S <=1    -  Piperacillin/Tazobactam: I 64    -  Piperacillin/Tazobactam: S <=16    -  Tobramycin: S <=4    -  Tobramycin: S <=4    -  Trimethoprim/Sulfamethoxazole: S <=2/38    Specimen Source: .Sputum Sputum    Culture Results:   Numerous Serratia marcescens  Numerous Pseudomonas aeruginosa  Numerous Routine respiratory marquis present    Organism Identification: Serratia marcescens  Pseudomonas aeruginosa    Organism: Serratia marcescens    Organism: Pseudomonas aeruginosa    Method Type: KARSON    Method Type: KARSON      03-04 @ 01:08 .Blood Blood-Peripheral     No growth at 5 days.    03-04 @ 01:07 .Blood Blood-Peripheral     No growth at 5 days.

## 2018-03-14 VITALS — HEART RATE: 24 BPM

## 2018-03-14 LAB
CULTURE RESULTS: SIGNIFICANT CHANGE UP
SPECIMEN SOURCE: SIGNIFICANT CHANGE UP

## 2018-03-14 RX ADMIN — FENTANYL CITRATE 50 MICROGRAM(S): 50 INJECTION INTRAVENOUS at 00:23

## 2018-03-14 RX ADMIN — FENTANYL CITRATE 50 MICROGRAM(S): 50 INJECTION INTRAVENOUS at 00:24

## 2018-03-14 NOTE — DISCHARGE NOTE FOR THE EXPIRED PATIENT - HOSPITAL COURSE
2/27 j-tube study done, cleared to start feeds. 3/1 stomach distention on chest CT (d/t NGT not properly connected to suction), decompressed 500cc.3/3: epidural removed, NGT pulled back by Wesley, then readvanced, R CT d/c’d, abdominal pain, fever, CT chest/abd performed, cannot rule out leak in chest.  Pan cultured, started on vanco/zosyn.  3/6 MBS: dysphagia 3, nector thick liquids, CT with contrast down NGT> contained small leak, medial to the anastomosis (not real per VS)  3/7 Ng d/c 3/8 Aspiration resulting in PEA/hypoxic arrest pt unresponsive code blue called pt intubated by cardiac anesthesia, bedside bronch by VJS, vanco added; Bronch at bedside (no gastric content, has small hole in trachea per VJS NTD) hypoxic post bronch despite inc PEEP/Ambu bagging, Nitrict added; CO2 too high to calculate on ABG vent optimized and Sodium Bicarb gtt started, lines changed per ID; MICU consult called à vent changes made- no improvement, ETT upsized (8.5) 3/10 sedation vacation, nimbex/versed off, overbreathing vent, Diamox 250 for bicarb 39. 3/11 solumedrol d/c’d, Lasix gtt d/c’d, RUE duplex (floating non-occlusive thrombus in right axillay vein), OGT coiled in mouth- new OGT placed, rapid afib with RVR given Lopressor no response, rate improved w/ Cardizem, 3/12 dig load (.75), DCCV (a-flutter to NSR). 3/13 bronch by BF> hole in L mainstem  , pt family consulted , and comfort care with terminal wean determined.   pt withdrawn for care and pronounced at 0040 3/14

## 2018-03-14 NOTE — PROVIDER CONTACT NOTE (EICU) - SITUATION
eICU intervention note
[place] referral made. patient unresponsive, not initiating breaths on ventilator. GCS 3. s/p robot assisted esophagectomy.

## 2018-03-14 NOTE — CHART NOTE - NSCHARTNOTEFT_GEN_A_CORE
Pts with increase pressor requirement , severe resp acidosis. pt started on CVVHD with no removal .   pt family called by Dr Blanco and made aware of current situation. pt's HCP ( son) at bedside wants DNR status and all pressors turned off.  pt son want Vent to cont at present.   DNR signed and ordered.   D/W Dr Blanco Pts with increase pressor requirement , severe resp acidosis. pt started on CVVHD with no removal .   pt family called by Dr Blanco and made aware of current situation. pt's HCP ( son) at bedside wants DNR status and all pressors turned off.  pt son want Vent to cont at present.   DNR signed and ordered.   D/W Dr Blanco    Addendum:  Family wants to terminal wean and off the vent.   pt family consulted on decision.     Dr blanco made aware

## 2018-03-14 NOTE — PROVIDER CONTACT NOTE (EICU) - ACTION/TREATMENT ORDERED:
Artificial tears, both eyes q6 prn,  ordered.
Reference # [id number]  [facility] rep: Shy Ha

## 2018-03-14 NOTE — PROVIDER CONTACT NOTE (EICU) - RECOMMENDATIONS
order placed and results to be followed by bedside team
Viral RN requests prn artificial tears
patient not a suitable candidate for organ or tissue donation due to esophageal cancer, Hodgkin's Lymphoma.

## 2018-03-15 LAB
-  CANDIDA ALBICANS: SIGNIFICANT CHANGE UP
-  CANDIDA GLABRATA: SIGNIFICANT CHANGE UP
-  CANDIDA KRUSEI: SIGNIFICANT CHANGE UP
-  CANDIDA PARAPSILOSIS: SIGNIFICANT CHANGE UP
-  CANDIDA TROPICALIS: SIGNIFICANT CHANGE UP
-  COAGULASE NEGATIVE STAPHYLOCOCCUS: SIGNIFICANT CHANGE UP
-  K. PNEUMONIAE GROUP: SIGNIFICANT CHANGE UP
-  KPC RESISTANCE GENE: SIGNIFICANT CHANGE UP
-  STREPTOCOCCUS SP. (NOT GRP A, B OR S PNEUMONIAE): SIGNIFICANT CHANGE UP
A BAUMANNII DNA SPEC QL NAA+PROBE: SIGNIFICANT CHANGE UP
E CLOAC COMP DNA BLD POS QL NAA+PROBE: SIGNIFICANT CHANGE UP
E COLI DNA BLD POS QL NAA+NON-PROBE: SIGNIFICANT CHANGE UP
ENTEROCOC DNA BLD POS QL NAA+NON-PROBE: SIGNIFICANT CHANGE UP
ENTEROCOC DNA BLD POS QL NAA+NON-PROBE: SIGNIFICANT CHANGE UP
GP B STREP DNA BLD POS QL NAA+NON-PROBE: SIGNIFICANT CHANGE UP
HAEM INFLU DNA BLD POS QL NAA+NON-PROBE: SIGNIFICANT CHANGE UP
K OXYTOCA DNA BLD POS QL NAA+NON-PROBE: SIGNIFICANT CHANGE UP
L MONOCYTOG DNA BLD POS QL NAA+NON-PROBE: SIGNIFICANT CHANGE UP
METHOD TYPE: SIGNIFICANT CHANGE UP
MRSA SPEC QL CULT: SIGNIFICANT CHANGE UP
MSSA DNA SPEC QL NAA+PROBE: SIGNIFICANT CHANGE UP
N MEN ISLT CULT: SIGNIFICANT CHANGE UP
ORGANISM # SPEC MICROSCOPIC CNT: SIGNIFICANT CHANGE UP
P AERUGINOSA DNA BLD POS NAA+NON-PROBE: SIGNIFICANT CHANGE UP
PROTEUS SP DNA BLD POS QL NAA+NON-PROBE: SIGNIFICANT CHANGE UP
S MARCESCENS DNA BLD POS NAA+NON-PROBE: SIGNIFICANT CHANGE UP
S PNEUM DNA BLD POS QL NAA+NON-PROBE: SIGNIFICANT CHANGE UP
S PYO DNA BLD POS QL NAA+NON-PROBE: SIGNIFICANT CHANGE UP
SPECIMEN SOURCE: SIGNIFICANT CHANGE UP

## 2018-03-17 LAB
-  LEVOFLOXACIN: SIGNIFICANT CHANGE UP
-  TRIMETHOPRIM/SULFAMETHOXAZOLE: SIGNIFICANT CHANGE UP
METHOD TYPE: SIGNIFICANT CHANGE UP

## 2018-03-18 LAB
CULTURE RESULTS: SIGNIFICANT CHANGE UP
SPECIMEN SOURCE: SIGNIFICANT CHANGE UP

## 2018-03-19 ENCOUNTER — RESULT REVIEW (OUTPATIENT)
Age: 58
End: 2018-03-19

## 2018-03-19 LAB
CULTURE RESULTS: SIGNIFICANT CHANGE UP
ORGANISM # SPEC MICROSCOPIC CNT: SIGNIFICANT CHANGE UP
SPECIMEN SOURCE: SIGNIFICANT CHANGE UP

## 2018-05-23 PROCEDURE — 86900 BLOOD TYPING SEROLOGIC ABO: CPT

## 2018-05-23 PROCEDURE — 86901 BLOOD TYPING SEROLOGIC RH(D): CPT

## 2018-05-23 PROCEDURE — 36600 WITHDRAWAL OF ARTERIAL BLOOD: CPT

## 2018-05-23 PROCEDURE — 94760 N-INVAS EAR/PLS OXIMETRY 1: CPT

## 2018-05-23 PROCEDURE — 85384 FIBRINOGEN ACTIVITY: CPT

## 2018-05-23 PROCEDURE — 83605 ASSAY OF LACTIC ACID: CPT

## 2018-05-23 PROCEDURE — 36430 TRANSFUSION BLD/BLD COMPNT: CPT

## 2018-05-23 PROCEDURE — 86880 COOMBS TEST DIRECT: CPT

## 2018-05-23 PROCEDURE — 93971 EXTREMITY STUDY: CPT

## 2018-05-23 PROCEDURE — 93005 ELECTROCARDIOGRAM TRACING: CPT

## 2018-05-23 PROCEDURE — 36415 COLL VENOUS BLD VENIPUNCTURE: CPT

## 2018-05-23 PROCEDURE — 97760 ORTHOTIC MGMT&TRAING 1ST ENC: CPT

## 2018-05-23 PROCEDURE — 94640 AIRWAY INHALATION TREATMENT: CPT

## 2018-05-23 PROCEDURE — P9016: CPT

## 2018-05-23 PROCEDURE — 97167 OT EVAL HIGH COMPLEX 60 MIN: CPT

## 2018-05-23 PROCEDURE — 86850 RBC ANTIBODY SCREEN: CPT

## 2018-05-23 PROCEDURE — 87070 CULTURE OTHR SPECIMN AEROBIC: CPT

## 2018-05-23 PROCEDURE — 86923 COMPATIBILITY TEST ELECTRIC: CPT

## 2018-05-23 PROCEDURE — P9045: CPT

## 2018-05-23 PROCEDURE — 71250 CT THORAX DX C-: CPT

## 2018-05-23 PROCEDURE — 83735 ASSAY OF MAGNESIUM: CPT

## 2018-05-23 PROCEDURE — 80053 COMPREHEN METABOLIC PANEL: CPT

## 2018-05-23 PROCEDURE — 92960 CARDIOVERSION ELECTRIC EXT: CPT

## 2018-05-23 PROCEDURE — 94003 VENT MGMT INPAT SUBQ DAY: CPT

## 2018-05-23 PROCEDURE — 82330 ASSAY OF CALCIUM: CPT

## 2018-05-23 PROCEDURE — 87040 BLOOD CULTURE FOR BACTERIA: CPT

## 2018-05-23 PROCEDURE — 85027 COMPLETE CBC AUTOMATED: CPT

## 2018-05-23 PROCEDURE — 71045 X-RAY EXAM CHEST 1 VIEW: CPT

## 2018-05-23 PROCEDURE — 74018 RADEX ABDOMEN 1 VIEW: CPT

## 2018-05-23 PROCEDURE — 84132 ASSAY OF SERUM POTASSIUM: CPT

## 2018-05-23 PROCEDURE — 84484 ASSAY OF TROPONIN QUANT: CPT

## 2018-05-23 PROCEDURE — 92526 ORAL FUNCTION THERAPY: CPT

## 2018-05-23 PROCEDURE — 86870 RBC ANTIBODY IDENTIFICATION: CPT

## 2018-05-23 PROCEDURE — 86922 COMPATIBILITY TEST ANTIGLOB: CPT

## 2018-05-23 PROCEDURE — 80076 HEPATIC FUNCTION PANEL: CPT

## 2018-05-23 PROCEDURE — 87150 DNA/RNA AMPLIFIED PROBE: CPT

## 2018-05-23 PROCEDURE — 82150 ASSAY OF AMYLASE: CPT

## 2018-05-23 PROCEDURE — 84478 ASSAY OF TRIGLYCERIDES: CPT

## 2018-05-23 PROCEDURE — S2900: CPT

## 2018-05-23 PROCEDURE — 74177 CT ABD & PELVIS W/CONTRAST: CPT

## 2018-05-23 PROCEDURE — 92950 HEART/LUNG RESUSCITATION CPR: CPT

## 2018-05-23 PROCEDURE — 82803 BLOOD GASES ANY COMBINATION: CPT

## 2018-05-23 PROCEDURE — 82550 ASSAY OF CK (CPK): CPT

## 2018-05-23 PROCEDURE — 85730 THROMBOPLASTIN TIME PARTIAL: CPT

## 2018-05-23 PROCEDURE — 83690 ASSAY OF LIPASE: CPT

## 2018-05-23 PROCEDURE — 92611 MOTION FLUOROSCOPY/SWALLOW: CPT

## 2018-05-23 PROCEDURE — 82947 ASSAY GLUCOSE BLOOD QUANT: CPT

## 2018-05-23 PROCEDURE — 88305 TISSUE EXAM BY PATHOLOGIST: CPT

## 2018-05-23 PROCEDURE — 94002 VENT MGMT INPAT INIT DAY: CPT

## 2018-05-23 PROCEDURE — 80048 BASIC METABOLIC PNL TOTAL CA: CPT

## 2018-05-23 PROCEDURE — 84134 ASSAY OF PREALBUMIN: CPT

## 2018-05-23 PROCEDURE — 87086 URINE CULTURE/COLONY COUNT: CPT

## 2018-05-23 PROCEDURE — 87186 SC STD MICRODIL/AGAR DIL: CPT

## 2018-05-23 PROCEDURE — 85014 HEMATOCRIT: CPT

## 2018-05-23 PROCEDURE — 94799 UNLISTED PULMONARY SVC/PX: CPT

## 2018-05-23 PROCEDURE — 93306 TTE W/DOPPLER COMPLETE: CPT

## 2018-05-23 PROCEDURE — 84145 PROCALCITONIN (PCT): CPT

## 2018-05-23 PROCEDURE — 82962 GLUCOSE BLOOD TEST: CPT

## 2018-05-23 PROCEDURE — 84100 ASSAY OF PHOSPHORUS: CPT

## 2018-05-23 PROCEDURE — 80202 ASSAY OF VANCOMYCIN: CPT

## 2018-05-23 PROCEDURE — 82435 ASSAY OF BLOOD CHLORIDE: CPT

## 2018-05-23 PROCEDURE — 85610 PROTHROMBIN TIME: CPT

## 2018-05-23 PROCEDURE — 88309 TISSUE EXAM BY PATHOLOGIST: CPT

## 2018-05-23 PROCEDURE — 84295 ASSAY OF SERUM SODIUM: CPT

## 2018-05-23 PROCEDURE — 74230 X-RAY XM SWLNG FUNCJ C+: CPT

## 2018-07-17 PROBLEM — C81.90 HODGKIN LYMPHOMA, UNSPECIFIED, UNSPECIFIED SITE: Chronic | Status: ACTIVE | Noted: 2018-02-12

## 2018-07-23 PROBLEM — Z78.9 ALCOHOL USE: Status: ACTIVE | Noted: 2017-08-10

## 2018-08-29 NOTE — PROCEDURE NOTE - NSTIMEOUT_GEN_A_CORE
Patient's first and last name, , procedure, and correct site confirmed prior to the start of procedure. none

## 2019-03-19 NOTE — OCCUPATIONAL THERAPY INITIAL EVALUATION ADULT - SHORT TERM MEMORY, REHAB EVAL
Last seen by you. Was compression stocking ordered for him? [facility] requesting notes and demographics to be faxed to them. She said they already got the order   impaired

## 2020-01-28 NOTE — PROGRESS NOTE ADULT - PROBLEM SELECTOR PLAN 3
resolved Azithromycin Pregnancy And Lactation Text: This medication is considered safe during pregnancy and is also secreted in breast milk.

## 2021-10-11 NOTE — ASU PATIENT PROFILE, ADULT - NS SC CAGE ALCOHOL GUILTY ABOUT
no Tremfya Counseling: I discussed with the patient the risks of guselkumab including but not limited to immunosuppression, serious infections, and drug reactions.  The patient understands that monitoring is required including a PPD at baseline and must alert us or the primary physician if symptoms of infection or other concerning signs are noted.

## 2022-11-02 NOTE — PROGRESS NOTE ADULT - SUBJECTIVE AND OBJECTIVE BOX
Brief Hospital Course:   2/26 s/p robotic [place] Mehran Esophagogastrectomy and jejunostomy tube placement.   2/27 J tube placement confirmed on gastrograffin study  2/28 Right pleural chest tube dressing saturated, removed at bedside, likely leaking around tube site without obvious drainage when evaluated.   3/1 CXR ? change in right pleural space, CT chest done without significant findings as per Dr. Blanco, NGT secured to suction, CXR improved, patient feeling better   3/3 - worsening abdominal pain, CT chest suspicious for leak, some free IP air as expected after abdominal surgery, less than on CT performed 3/1, CT Abd- no acute abdominal findings on preliminary report.    3/4 Gram negative rods in sputum febrile to 101.9 on vanc zosyn   3/5 ID consulted, presumed PNA, zosyn continues, vanco d/c'd  3/6 MBS done, cleared for dysphagia 3 with nectar thick liquids  3/8 aspiration event resulting in PEA arrest, ACLS started, CPR x 4 minutes before ROSC, bedside bronch (small hole in trachea per Dr Blanco), hypercapnia, refractory hypoxemia requiring NO  3/9 reintubated with 9.0 ETT (from 8.0), vent settings adjusted with significant improvement in hypercapnic respiratory failure/acidemia  3/11 nimbex and versed d/c'd, lasix infusion decreased then d/c'd, diamox given, Melchor weaned off, NGT d/c'd    Significant recent/past 24 hr events: episode of hypoxemia and hypercapnia requiring up titration of FiO2 and adjustment of vent settings to correct, DCCV x 1 for a-flutter with RVR accompanied by hypotension.      Subjective/ROS: unable to obtain (intubated & sedated)      Patient is a 57y old  Male who presents with a chief complaint of Esophageal Cancer (12 Feb 2018 10:28)    HPI:  Pt is a 57 y.o male with esophageal cancer diagnosed 5 months ago with recent unintentional weight loss of about 40 lbs now scheduled for esophagogastroduodenoscopy (EGD) Robotic [place] Mehran esophagogastrectomy. (12 Feb 2018 09:27)    PAST MEDICAL & SURGICAL HISTORY:  Esophageal cancer  Weight loss, unintentional  Hodgkins lymphoma: 25 years ago  Acid reflux disease  No significant past surgical history    FAMILY HISTORY:  Family history of prostate cancer in father (Father)      Vitals   ICU Vital Signs Last 24 Hrs  T(C): 37 (13 Mar 2018 00:00), Max: 38.2 (12 Mar 2018 14:00)  T(F): 98.6 (13 Mar 2018 00:00), Max: 100.8 (12 Mar 2018 14:00)  HR: 85 (13 Mar 2018 01:00) (80 - 145), NSR  BP: 111/63 (12 Mar 2018 08:20) (111/63 - 111/63)  BP(mean): 81 (12 Mar 2018 08:20) (81 - 81)  ABP: 138/52 (13 Mar 2018 01:00) (74/41 - 152/71)  ABP(mean): 69 (13 Mar 2018 01:00) (50 - 91)  RR: 18 (13 Mar 2018 01:00) (12 - 41)  SpO2: 95% (13 Mar 2018 01:00) (88% - 100%)      VENT SETTINGS   Mode: AC/ CMV (Assist Control/ Continuous Mandatory Ventilation)  RR (machine): 34  TV (machine): 550  FiO2: 70  PEEP: 8  MAP: 17  PIP: 33  Peak Flow: 110 L/min  I:E 1:3.4 sec    ABG - ( 13 Mar 2018 00:18 )  pH: 7.31  /  pCO2: 71    /  pO2: 184   / HCO3: 32    / Base Excess: 7.7   /  SaO2: 99        ABG - (03.12.18 @ 20:09)  pH: 7.24 /  pCO2: 86 / pO2: 105 / HCO3: 32 /  Base Excess: 7.9 / SaO2: 97       I&O's Detail    11 Mar 2018 07:01  -  12 Mar 2018 07:00  --------------------------------------------------------  IN:    diltiazem Infusion: 152.5 mL    diltiazem Infusion: 25 mL    Enteral Tube Flush: 60 mL    Jevity: 80 mL    Lactated Ringers IV Bolus: 250 mL    Other: 20 mL    propofol Infusion: 353.4 mL    Solution: 100 mL    Solution: 100 mL    Solution: 150 mL  Total IN: 1290.9 mL    OUT:    Bulb: 40 mL    Indwelling Catheter - Urethral: 2415 mL    Other: 40 mL  Total OUT: 2495 mL    Total NET: -1204.1 mL      12 Mar 2018 07:01  -  13 Mar 2018 01:51  --------------------------------------------------------  IN:    amiodarone Infusion: 166.5 mL    diltiazem Infusion: 90 mL    Enteral Tube Flush: 40 mL    heparin  Infusion.: 91 mL    phenylephrine   Infusion: 30 mL    propofol Infusion: 159.4 mL    Sodium Chloride 0.9% IV Bolus: 250 mL    Solution: 50 mL    Solution: 50 mL    Vital HN: 280 mL  Total IN: 1206.9 mL    OUT:    Bulb: 45 mL    Indwelling Catheter - Urethral: 1425 mL  Total OUT: 1470 mL    Total NET: -263.1 mL      LABS                        9.0    7.4   )-----------( 423      ( 12 Mar 2018 17:17 )             27.8     03-12    142  |  96<L>  |  49.0<H>  ----------------------------<  106  4.0   |  35.0<H>  |  1.49<H>    Ca    8.6      12 Mar 2018 03:23  Mg     2.7     03-12    PTT - ( 12 Mar 2018 17:17 )  PTT:32.2 sec      < from: Xray Chest 1 View- PORTABLE-Urgent (03.12.18 @ 15:34) >  FINDINGS:  Single frontal view of the chest demonstrates worsening right lower lobe consolidation and right hilar infiltrates. Worsening left lower lobe infiltrates. Right-sided chest tube. Endotracheal tube tip is above the kate. Left-sided central venous catheter is unchanged. The   cardiomediastinal silhouette is normal. No acute osseous abnormalities. Overlying EKG leads and wires are noted. Tiny right-sided pleural effusion.  IMPRESSION: Worsening right lower lobe consolidation. Stable right hilar infiltrates. Worsening left lower lobe infiltrates. Consider chest CT for further evaluation.  < end of copied text >      3/12/18 EKG s/p DCCV: NSR      MEDICATIONS  (STANDING):  amiodarone Infusion 1 mG/Min (33.333 mL/Hr) IV Continuous <Continuous>  amiodarone Infusion 0.5 mG/Min (16.667 mL/Hr) IV Continuous <Continuous>  cefepime  IVPB 2000 milliGRAM(s) IV Intermittent every 12 hours  chlorhexidine 0.12% Liquid 15 milliLiter(s) Swish and Spit two times a day  digoxin     Tablet 0.25 milliGRAM(s) Oral daily  enoxaparin Injectable 40 milliGRAM(s) SubCutaneous daily  levalbuterol Inhalation 1.25 milliGRAM(s) Inhalation every 6 hours  pantoprazole  Injectable 40 milliGRAM(s) IV Push daily  phenylephrine    Infusion 0.1 MICROgram(s)/kG/Min (2.025 mL/Hr) IV Continuous <Continuous>  propofol Infusion 10 MICROgram(s)/kG/Min (3.24 mL/Hr) IV Continuous <Continuous>    MEDICATIONS  (PRN):  artificial  tears Solution 1 Drop(s) Both EYES three times a day PRN Dry Eyes  bisacodyl Suppository 10 milliGRAM(s) Rectal daily PRN Constipation  fentaNYL    Injectable 50 MICROGram(s) IV Push every 3 hours PRN sedation off paralytic      Allergies:  No Known Allergies        Physical Exam:   Constitutional: thin, ill appearing  HEENT: + ETT, copious secretions, audible "leak" heard when pt's head turned to right but eliminated if in neutral/forward looking position  Respiratory: Breath Sounds equal & clear bilaterally to auscultation  Cardiovascular: regular rate, regular rhythm, normal S1, S2; no murmurs or rub  Chest: + LSC TLC site C/D/I, right CT to bulb suction, thoracotomy C/D/I without heat or redness  Abdomen/GI: Soft, non-tender, non distended, hyperactive bowel sounds, + J-tube. port holes x 5 C/D/I with steris, no heat or redness  : castro in situ to gravity drainage  Extremities: no peripheral edema except distal RUE/right hand, no cyanosis, no clubbing.   Vascular: Equal and normal pulses: 2+ peripheral pulses throughout  Neurological: sedated  Psychiatric: sedated  Skin: warm, dry, well perfused, no rashes    Critical care time spent: __38__minutes (reviewing chart including medication, labs and imaging results, discussions with interdisciplinary team, discussing goals of care/advanced directives, counseling patient and/or family, non-inclusive of procedures) Update History & Physical    The Patient's History and Physical of November 2, 2022 was reviewed with the patient and I examined the patient. There was no change. The surgical site was confirmed by the patient and me. Plan:  The risk, benefits, expected outcome, and alternative to the recommended procedure have been discussed with the patient. Patient understands and wants to proceed with the procedure.     Electronically signed by Lily Zarco MD on 11/2/2022 at 7:37 AM

## 2023-01-01 NOTE — SWALLOW VFSS/MBS ASSESSMENT ADULT - SLP GENERAL OBSERVATIONS
Pt received & seen seated upright via stretcher in radiology, +awake/alert, +oriented, =NGT, +accompanied by JERED Bryant on monitor. [facility] Pt received & seen seated upright via stretcher in radiology, +awake/alert, +oriented, +NGT, +accompanied by JERED Bryant on monitor. Pt received & seen seated upright via stretcher in radiology, +awake/alert, +oriented, +NGT, +accompanied by JERED Bryant on monitor, +baseline throat clearing observed.

## 2023-03-01 NOTE — H&P PST ADULT - PROBLEM/PLAN-1
DIAGNOSIS: Patient wants Right shoulder injection   APPOINTMENT DATE: 3.1.23   NOTES STATUS DETAILS   OFFICE NOTE from other specialist Internal 9.28.22 Gabriel Marrero MD  1.26.22 5.25.21 Dayton Zepeda MD  5.20.19 5.13.19 5.23.19 Rigo Brunson MD     MEDICATION LIST Internal    XRAYS (IMAGES & REPORTS) In process Internal:  3.9.22 R shoulder  12.13.19 B shoulder  5.20.19 R shoulder    [place]:  2.11.20 B shoulder       Action March 1, 2023 10:08 AM    Action Taken Faxed request xray images to [place]. Fax [phone]              
DISPLAY PLAN FREE TEXT

## 2023-12-14 NOTE — OCCUPATIONAL THERAPY INITIAL EVALUATION ADULT - UPPER BODY DRESSING, PREVIOUS LEVEL OF FUNCTION, OT EVAL
----- Message from Justino Schmitz DO sent at 12/14/2023  1:38 PM CST -----  Please ensure that patient is treated for BV and yeast infections.   independent

## 2024-04-30 NOTE — PROGRESS NOTE ADULT - ASSESSMENT
"  Problem: Adult Inpatient Plan of Care  Goal: Plan of Care Review  Description:     Outcome: Adequate for Care Transition  Goal: Patient-Specific Goal (Individualized)  Description: You can add care plan individualizations to a care plan. Examples of Individualization might be:  \"Parent requests to be called daily at 9am for status\", \"I have a hard time hearing out of my right ear\", or \"Do not touch me to wake me up as it startles  me\".  Outcome: Adequate for Care Transition  Goal: Absence of Hospital-Acquired Illness or Injury  Outcome: Adequate for Care Transition  Goal: Optimal Comfort and Wellbeing  Outcome: Adequate for Care Transition  Goal: Readiness for Transition of Care  Outcome: Adequate for Care Transition   Goal Outcome Evaluation:                        " BRIEF HOSPITAL COURSE: 56 yo admitted electively for [place] Mehran esophagectomy for esophageal cancer on 2/26; had an aspiration event this AM and developed PEA arrest with difficulty oxygenating, ventilating, and shock.  Medical critical care service was consulted for severe ARDS management.

## 2024-10-24 NOTE — PROGRESS NOTE ADULT - PROVIDER SPECIALTY LIST ADULT
Surgery Price (Do Not Change): 0.00 Instructions: This plan will send the code FBSE to the PM system.  DO NOT or CHANGE the price. Detail Level: Zone

## 2025-01-14 NOTE — ASU PREOP CHECKLIST - PATIENT SENT TO
Detail Level: Generalized operating room Detail Level: Zone Patient Specific Counseling (Will Not Stick From Patient To Patient): $89 for 10 if pt decides. Detail Level: Detailed